# Patient Record
Sex: FEMALE | Race: WHITE | NOT HISPANIC OR LATINO
[De-identification: names, ages, dates, MRNs, and addresses within clinical notes are randomized per-mention and may not be internally consistent; named-entity substitution may affect disease eponyms.]

---

## 2017-03-29 ENCOUNTER — RX RENEWAL (OUTPATIENT)
Age: 62
End: 2017-03-29

## 2017-04-11 ENCOUNTER — RX RENEWAL (OUTPATIENT)
Age: 62
End: 2017-04-11

## 2017-04-25 ENCOUNTER — APPOINTMENT (OUTPATIENT)
Dept: NEUROLOGY | Facility: CLINIC | Age: 62
End: 2017-04-25

## 2017-04-25 VITALS
SYSTOLIC BLOOD PRESSURE: 109 MMHG | WEIGHT: 115 LBS | DIASTOLIC BLOOD PRESSURE: 69 MMHG | HEIGHT: 68 IN | BODY MASS INDEX: 17.43 KG/M2 | HEART RATE: 70 BPM

## 2017-05-16 ENCOUNTER — MEDICATION RENEWAL (OUTPATIENT)
Age: 62
End: 2017-05-16

## 2017-06-12 ENCOUNTER — MEDICATION RENEWAL (OUTPATIENT)
Age: 62
End: 2017-06-12

## 2017-06-22 ENCOUNTER — APPOINTMENT (OUTPATIENT)
Dept: INTERNAL MEDICINE | Facility: CLINIC | Age: 62
End: 2017-06-22

## 2017-06-22 VITALS
HEART RATE: 72 BPM | SYSTOLIC BLOOD PRESSURE: 106 MMHG | RESPIRATION RATE: 16 BRPM | OXYGEN SATURATION: 88 % | DIASTOLIC BLOOD PRESSURE: 62 MMHG | TEMPERATURE: 97.7 F

## 2017-06-22 VITALS — HEIGHT: 68 IN | BODY MASS INDEX: 16.06 KG/M2 | WEIGHT: 106 LBS

## 2017-06-22 DIAGNOSIS — G23.9 DEGENERATIVE DISEASE OF BASAL GANGLIA, UNSPECIFIED: ICD-10-CM

## 2017-07-03 ENCOUNTER — EMERGENCY (EMERGENCY)
Facility: HOSPITAL | Age: 62
LOS: 1 days | Discharge: HOME | End: 2017-07-03
Attending: EMERGENCY MEDICINE | Admitting: EMERGENCY MEDICINE
Payer: MEDICARE

## 2017-07-03 VITALS
OXYGEN SATURATION: 91 % | RESPIRATION RATE: 19 BRPM | SYSTOLIC BLOOD PRESSURE: 149 MMHG | DIASTOLIC BLOOD PRESSURE: 65 MMHG | HEART RATE: 88 BPM | TEMPERATURE: 98 F

## 2017-07-03 VITALS
HEART RATE: 89 BPM | RESPIRATION RATE: 20 BRPM | OXYGEN SATURATION: 95 % | TEMPERATURE: 98 F | DIASTOLIC BLOOD PRESSURE: 91 MMHG | SYSTOLIC BLOOD PRESSURE: 180 MMHG

## 2017-07-03 LAB
ALBUMIN SERPL ELPH-MCNC: 3.7 G/DL — SIGNIFICANT CHANGE UP (ref 3.3–5)
ALP SERPL-CCNC: 73 U/L — SIGNIFICANT CHANGE UP (ref 40–120)
ALT FLD-CCNC: 5 U/L RC — LOW (ref 10–45)
ANION GAP SERPL CALC-SCNC: 10 MMOL/L — SIGNIFICANT CHANGE UP (ref 5–17)
APTT BLD: 30.1 SEC — SIGNIFICANT CHANGE UP (ref 27.5–37.4)
AST SERPL-CCNC: 18 U/L — SIGNIFICANT CHANGE UP (ref 10–40)
BASOPHILS # BLD AUTO: 0 K/UL — SIGNIFICANT CHANGE UP (ref 0–0.2)
BASOPHILS NFR BLD AUTO: 0.4 % — SIGNIFICANT CHANGE UP (ref 0–2)
BILIRUB SERPL-MCNC: 0.2 MG/DL — SIGNIFICANT CHANGE UP (ref 0.2–1.2)
BUN SERPL-MCNC: 24 MG/DL — HIGH (ref 7–23)
CALCIUM SERPL-MCNC: 9.1 MG/DL — SIGNIFICANT CHANGE UP (ref 8.4–10.5)
CHLORIDE SERPL-SCNC: 94 MMOL/L — LOW (ref 96–108)
CO2 SERPL-SCNC: 32 MMOL/L — HIGH (ref 22–31)
CREAT SERPL-MCNC: 0.55 MG/DL — SIGNIFICANT CHANGE UP (ref 0.5–1.3)
EOSINOPHIL # BLD AUTO: 0 K/UL — SIGNIFICANT CHANGE UP (ref 0–0.5)
EOSINOPHIL NFR BLD AUTO: 0.4 % — SIGNIFICANT CHANGE UP (ref 0–6)
GLUCOSE SERPL-MCNC: 102 MG/DL — HIGH (ref 70–99)
HCT VFR BLD CALC: 39.9 % — SIGNIFICANT CHANGE UP (ref 34.5–45)
HGB BLD-MCNC: 13.3 G/DL — SIGNIFICANT CHANGE UP (ref 11.5–15.5)
INR BLD: 0.95 RATIO — SIGNIFICANT CHANGE UP (ref 0.88–1.16)
LYMPHOCYTES # BLD AUTO: 0.7 K/UL — LOW (ref 1–3.3)
LYMPHOCYTES # BLD AUTO: 10.4 % — LOW (ref 13–44)
MCHC RBC-ENTMCNC: 33.4 GM/DL — SIGNIFICANT CHANGE UP (ref 32–36)
MCHC RBC-ENTMCNC: 33.4 PG — SIGNIFICANT CHANGE UP (ref 27–34)
MCV RBC AUTO: 100 FL — SIGNIFICANT CHANGE UP (ref 80–100)
MONOCYTES # BLD AUTO: 0.5 K/UL — SIGNIFICANT CHANGE UP (ref 0–0.9)
MONOCYTES NFR BLD AUTO: 7.1 % — SIGNIFICANT CHANGE UP (ref 2–14)
NEUTROPHILS # BLD AUTO: 5.6 K/UL — SIGNIFICANT CHANGE UP (ref 1.8–7.4)
NEUTROPHILS NFR BLD AUTO: 81.7 % — HIGH (ref 43–77)
PLATELET # BLD AUTO: 154 K/UL — SIGNIFICANT CHANGE UP (ref 150–400)
POTASSIUM SERPL-MCNC: 4.5 MMOL/L — SIGNIFICANT CHANGE UP (ref 3.5–5.3)
POTASSIUM SERPL-SCNC: 4.5 MMOL/L — SIGNIFICANT CHANGE UP (ref 3.5–5.3)
PROT SERPL-MCNC: 6.9 G/DL — SIGNIFICANT CHANGE UP (ref 6–8.3)
PROTHROM AB SERPL-ACNC: 10.3 SEC — SIGNIFICANT CHANGE UP (ref 9.8–12.7)
RBC # BLD: 3.99 M/UL — SIGNIFICANT CHANGE UP (ref 3.8–5.2)
RBC # FLD: 13.2 % — SIGNIFICANT CHANGE UP (ref 10.3–14.5)
SODIUM SERPL-SCNC: 136 MMOL/L — SIGNIFICANT CHANGE UP (ref 135–145)
WBC # BLD: 6.9 K/UL — SIGNIFICANT CHANGE UP (ref 3.8–10.5)
WBC # FLD AUTO: 6.9 K/UL — SIGNIFICANT CHANGE UP (ref 3.8–10.5)

## 2017-07-03 PROCEDURE — 80053 COMPREHEN METABOLIC PANEL: CPT

## 2017-07-03 PROCEDURE — 99283 EMERGENCY DEPT VISIT LOW MDM: CPT | Mod: 25

## 2017-07-03 PROCEDURE — 85027 COMPLETE CBC AUTOMATED: CPT

## 2017-07-03 PROCEDURE — 71045 X-RAY EXAM CHEST 1 VIEW: CPT

## 2017-07-03 PROCEDURE — 99284 EMERGENCY DEPT VISIT MOD MDM: CPT | Mod: GC

## 2017-07-03 PROCEDURE — 85730 THROMBOPLASTIN TIME PARTIAL: CPT

## 2017-07-03 PROCEDURE — 85610 PROTHROMBIN TIME: CPT

## 2017-07-03 PROCEDURE — 71010: CPT | Mod: 26

## 2017-07-03 NOTE — ED ADULT NURSE NOTE - OBJECTIVE STATEMENT
61 yo female pt with history of alzheimers and parkinsons presents to ed via ambulance complaining of blood in mouth. as per pt's home aid pt is nonverbal at baseline "I found blood in her mouth and she was gurgling". ems states aid suctioned 150cc of blood from mouth. pulse ox 80% on RA, placed on 2L NC pulse ox 96%. aid denies trauma/fall to pt. as per aid "she fluctuates in oxygen constantly". aid also states she has a history of aspirations. airway patent. no blood noted in mouth or loose teeth. breath sounds clear and equal bilaterally. pt placed sitting up right. family at bedside. safety maintained. pt appears to be in no distress.

## 2017-07-03 NOTE — ED PROVIDER NOTE - CARE PLAN
Principal Discharge DX:	Mouth bleeding  Instructions for follow-up, activity and diet:	Follow up with your primary care doctor within 48-72 hours.   You must return for new, worsening or concerning symptoms; specifically including those listed on the attached sheet. Principal Discharge DX:	Mouth bleeding  Goal:	ATTENDING ADDENDUM (Dr. Curtis Maki): Goals of care include resolution of emergent/urgent symptoms and concerns, and restoration to baseline level of homeostasis.  Instructions for follow-up, activity and diet:	Follow up with your primary care doctor within 48-72 hours.   You must return for new, worsening or concerning symptoms; specifically including those listed on the attached sheet.  Secondary Diagnosis:	Epistaxis  Secondary Diagnosis:	Parkinson's disease (tremor, stiffness, slow motion, unstable posture)

## 2017-07-03 NOTE — ED PROVIDER NOTE - MEDICAL DECISION MAKING DETAILS
**ATTENDING MEDICAL DECISION MAKING/SYNTHESIS (Dr. Curtis Maki): I have reviewed the Chief Complaint, the HPI, the ROS, and have directly performed and confirmed the findings on the Physical Examination. I have reviewed the medical decision making with all providers, as applicable. The PROBLEM REPRESENTATION at this time is: 62-year-old woman with history of Alzheimer's dementia and Parkinson's disease, unspecified tonsil and adenoid disease, pharynx disease, and nonverbal at baseline now presenting to the ED with acute witnessed transient episode of hypoxia and respiratory distress (gagging, gurgling, aspiration) from hemorrhage noted in the mouth by patient's home healthcare aide; now resolved. The MOST LIKELY DIAGNOSIS, and the LIST OF DIFFERENTIAL DIAGNOSES, includes (but is not limited to) the following: hemopytsis (unlikely), hematemesis or coffee-ground emesis (unlikely), epistaxis (very possible), gingival wound (noted, but likely not cause of patient's event, more likely sequela of aggressive attempts at airway clearance), pulmonary embolism/deep venous thrombosis (unlikely given presentation and clinical findings), serious bacterial infection or sepsis/severe sepsis e.g. pneumonia (unlikely), PUD (associated with PEG, unlikely), dehydration, aspiration, electrolyte-metabolic-endocrine derangements, swallowing deficit secondary to deterioration of dementia or cerebrovascular accident/TIA (unlikely). The likelihood of each of these diagnoses has been appropriately considered in the context of this patient's presentation and my evaluation. PLAN: as described in EMR, including diagnostics, therapeutics and consultation as clinically warranted. I will continue to reevaluate the patient, including the results of all testing, and monitor response to therapy throughout the patient's course in the ED.

## 2017-07-03 NOTE — ED PROVIDER NOTE - OBJECTIVE STATEMENT
62 year old, history of alzheimers and parkinsons, unspecified tonsil and adenoid disease, pharynx disease,  nonverbal at baseline. aid at home heard gurgling in her mouth and saw blood in her mouth and suctioned out 150cc of blood. aid noted O2 sat on pulse ox to be in the low 80s and called EMS. on NRB by EMS saturation in high 90s. 62 year old, history of alzheimers and parkinsons, unspecified tonsil and adenoid disease, pharynx disease,  nonverbal at baseline. aid at home heard gurgling in her mouth and saw blood in her mouth and suctioned out 150cc of blood. aid noted O2 sat on pulse ox to be in the low 80s and called EMS. on NRB by EMS saturation in high 90s.    20:05 Madisyn PGY3: Family in room, discussed case. aid heard gurgling, placed suction in mouth and then saw blood. it is possible the blood came from trauma but aid says not likely. history of nose bleeds in the past. pulse ox at home does NOT have wave form and aid states she fluctuates oxygen sats constantly. needs O2 at home because of history of aspirations. 62 year old, history of alzheimers and parkinsons, unspecified tonsil and adenoid disease, pharynx disease,  nonverbal at baseline. aid at home heard gurgling in her mouth and saw blood in her mouth and suctioned out 150cc of blood. aid noted O2 sat on pulse ox to be in the low 80s and called EMS. on NRB by EMS saturation in high 90s.  20:05 Madisyn PGY3: Family in room, discussed case. aid heard gurgling, placed suction in mouth and then saw blood. it is possible the blood came from trauma but aid says not likely. history of nose bleeds in the past. pulse ox at home does NOT have wave form and aid states she fluctuates oxygen sats constantly. needs O2 at home because of history of aspirations.  **ATTENDING ADDENDUM (Dr. Curtis Maki): I attest that I have directly and personally interviewed and examined this patient and elicited a comparable history of present illness and review of systems as documented, along with my EM resident. I attest that I have made significant contributions to the documentation where necessary and as noted in the EMR.

## 2017-07-03 NOTE — ED PROVIDER NOTE - GASTROINTESTINAL, MLM
Abdomen soft, non-tender **ATTENDING ADDENDUM (Dr. Curtis Maki): NO guarding, rebound, or rigidity. NO pulsatile or non-pulsatile masses. NO hernias. NO obvious hepatosplenomegaly. POSITIVE PEG tube in expected position.

## 2017-07-03 NOTE — ED PROVIDER NOTE - ENMT, MLM
Airway patent, Nasal mucosa clear **ATTENDING ADDENDUM (Dr. Curtis Maki): right nare with evidence of prior epistaxis. Mouth with normal mucosa except for POSITIVE dried blood in the posterior pharynx. NO evidence of acute posterior epistaxis at this time. POSITIVE small superficial gingival laceration noted proximate to #[id number] (buccal aspect of gingiva), without active ooze or hemorrhage (hemostasis achieved with time at time of attending evaluation). Throat has no vesicles, no oropharyngeal exudates and uvula is midline.

## 2017-07-03 NOTE — ED PROVIDER NOTE - RESPIRATORY, MLM
Breath sounds clear and equal bilaterally. **ATTENDING ADDENDUM (Dr. Curtis Maki): NO wheezing, rales, rhonchi, crackles, stridor, drooling, retractions, nasal flaring, or tripoding.

## 2017-07-03 NOTE — ED PROVIDER NOTE - MUSCULOSKELETAL MINIMAL EXAM
atraumatic/no muscle tenderness/motor intact/**ATTENDING ADDENDUM (Dr. Curtis Maki): resting tremor noted/RANGE OF MOTION LIMITED

## 2017-07-03 NOTE — ED PROVIDER NOTE - PROGRESS NOTE DETAILS
**ATTENDING ADDENDUM (Dr. Curtis Maki): patient serially evaluated throughout ED course. NO acute deterioration up to this time in the ED. NO clinical evidence of active hemorrhage, either from epistaxis  or hemoptysis. NO obvious aspiration. NO evidence of acute respiratory failure at this time. Anticipatory guidance provided. Patient with home healthcare aide coverage at home. Appears to have good social and family support. Agree with discharge home with close outpatient followup with primary care physician/provider and with medications (if appropriate, if clinically indicated, and as prescribed, as noted in EMR). Goals/plan of ED care and disposition discussed with family and home healthcare aide at time of attending evaluation and throughout period of care in the ED by the ED team.

## 2017-07-03 NOTE — ED PROVIDER NOTE - CONSTITUTIONAL MENTATION
**ATTENDING ADDENDUM (Dr. Curtis Maki): non-verbal, responsive to some simple commands, consolable/awake/alert

## 2017-07-03 NOTE — ED PROVIDER NOTE - CHPI ED SYMPTOMS NEG
**ATTENDING ADDENDUM (Dr. Curtis Maki): at time of ED presentation, at baseline level of function and mental status

## 2017-07-03 NOTE — ED PROVIDER NOTE - PLAN OF CARE
Follow up with your primary care doctor within 48-72 hours.   You must return for new, worsening or concerning symptoms; specifically including those listed on the attached sheet. ATTENDING ADDENDUM (Dr. Curtis Maki): Goals of care include resolution of emergent/urgent symptoms and concerns, and restoration to baseline level of homeostasis.

## 2017-07-03 NOTE — ED PROVIDER NOTE - PSH
Appendix disease    Hernia    Intestinal disorder    Pharynx disease    Scoliosis    Tonsil and adenoid disease, chronic

## 2017-07-03 NOTE — ED PROVIDER NOTE - EYES, MLM
Clear bilaterally, pupils equal and round. **ATTENDING ADDENDUM (Dr. Curtis Maki): Extraocular muscle movements intact.

## 2017-07-03 NOTE — ED PROVIDER NOTE - PHYSICAL EXAMINATION
Madisyn: nonverbal, responds to commands , NAD, HEENT with dried blood in oropharynx, possible laceration to right posterior oropharynx, not actively bleeding otherwise and no facial asymmetry; lungs with transmitted upper airway sounds, heart with reg rhythm without murmur; abdomen soft  with peg tube in place; extremities with no edema; skin with no rashes, neuro exam aphasic with tremor of extremeties, moving all extremities. Madisyn: nonverbal, responds to commands , NAD, HEENT with dried blood in oropharynx, possible laceration to right posterior oropharynx, not actively bleeding otherwise right nostril with old cautery and possible recent bleed, no liliya blood and no facial asymmetry; lungs with transmitted upper airway sounds, heart with reg rhythm without murmur; abdomen soft  with peg tube in place; extremities with no edema; skin with no rashes, neuro exam aphasic with tremor of extremeties, moving all extremities. Madisyn: nonverbal, responds to commands , NAD, HEENT with dried blood in oropharynx, possible laceration to right posterior oropharynx, not actively bleeding otherwise right nostril with old cautery and possible recent bleed, no liliya blood and no facial asymmetry; lungs with transmitted upper airway sounds, heart with reg rhythm without murmur; abdomen soft  with peg tube in place; extremities with no edema; skin with no rashes, neuro exam aphasic with tremor of extremities, moving all extremities.

## 2017-07-03 NOTE — ED PROVIDER NOTE - CHPI ED SYMPTOMS POS
COUGH/**ATTENDING ADDENDUM (Dr. Curtis Maki): possible aspiration, possible hemoptysis or epistaxis/DIFFICULTY BREATHING

## 2017-07-03 NOTE — ED PROVIDER NOTE - NS ED ROS FT
**ATTENDING ADDENDUM (Dr. Curtis Maki): collateral HPI and review of systems obtained by son and home healthcare aide

## 2017-07-03 NOTE — ED PROVIDER NOTE - FAMILY HISTORY
<<-----Click on this checkbox to enter Family History Family history of Alzheimer's disease     Sibling  Still living? Yes, Estimated age: Age Unknown  Family history of arthritis, Age at diagnosis: Age Unknown

## 2017-07-03 NOTE — ED PROVIDER NOTE - ATTENDING CONTRIBUTION TO CARE
**ATTENDING ADDENDUM (Dr. Curtis Maki): I attest that I have directly examined this patient and reviewed and formulated the diagnostic and therapeutic management plan in collaboration with the EM resident. Please see MDM note and remainder of EMR for findings from CC, HPI, ROS, and PE. (Madisyn)

## 2017-07-03 NOTE — ED PROVIDER NOTE - GASTROINTESTINAL [+], MLM
**ATTENDING ADDENDUM (Dr. Curtis Maki): gastroesophageal reflux disease history; POSITIVE PEG tube in place

## 2017-07-03 NOTE — ED ADULT NURSE NOTE - PMH
Cognitive impairment    Depression    Gastroesophageal reflux disease, esophagitis presence not specified    Hypothyroidism    Incontinence of urine    Major depressive disorder, recurrent episode, unspecified severity    Mitral valve prolapse    Parkinson's disease (tremor, stiffness, slow motion, unstable posture)    Traumatic brain injury, without loss of consciousness, sequela

## 2017-07-03 NOTE — ED PROVIDER NOTE - UNABLE TO OBTAIN
Dementia **ATTENDING ADDENDUM (Dr. Curtis Maki): Exploration of CC, HPI and review of systems limited by patient's acuity and mental status (dementia) **ATTENDING ADDENDUM (Dr. Curtis Maki): Exploration of CC, HPI and review of systems limited by patient's acuity and mental status (dementia).

## 2017-07-03 NOTE — ED PROVIDER NOTE - CONDUCTED A DETAILED DISCUSSION WITH PATIENT AND/OR GUARDIAN REGARDING, MDM
radiology results/need for outpatient follow-up/return to ED if symptoms worsen, persist or questions arise/lab results/**ATTENDING ADDENDUM (Dr. Curtis Maki): Anticipatory guidance provided.

## 2017-07-13 ENCOUNTER — INPATIENT (INPATIENT)
Facility: HOSPITAL | Age: 62
LOS: 12 days | Discharge: NURSING, REHAB OR LONG-TERM CARE | DRG: 207 | End: 2017-07-26
Admitting: STUDENT IN AN ORGANIZED HEALTH CARE EDUCATION/TRAINING PROGRAM
Payer: MEDICARE

## 2017-07-13 VITALS
RESPIRATION RATE: 32 BRPM | SYSTOLIC BLOOD PRESSURE: 177 MMHG | DIASTOLIC BLOOD PRESSURE: 102 MMHG | OXYGEN SATURATION: 83 % | HEART RATE: 116 BPM

## 2017-07-13 DIAGNOSIS — J96.01 ACUTE RESPIRATORY FAILURE WITH HYPOXIA: ICD-10-CM

## 2017-07-13 DIAGNOSIS — Z29.9 ENCOUNTER FOR PROPHYLACTIC MEASURES, UNSPECIFIED: ICD-10-CM

## 2017-07-13 DIAGNOSIS — R04.0 EPISTAXIS: ICD-10-CM

## 2017-07-13 DIAGNOSIS — E03.9 HYPOTHYROIDISM, UNSPECIFIED: ICD-10-CM

## 2017-07-13 DIAGNOSIS — G21.19 OTHER DRUG INDUCED SECONDARY PARKINSONISM: ICD-10-CM

## 2017-07-13 DIAGNOSIS — J96.90 RESPIRATORY FAILURE, UNSPECIFIED, UNSPECIFIED WHETHER WITH HYPOXIA OR HYPERCAPNIA: ICD-10-CM

## 2017-07-13 LAB
ALBUMIN SERPL ELPH-MCNC: 4.2 G/DL — SIGNIFICANT CHANGE UP (ref 3.3–5)
ALP SERPL-CCNC: 73 U/L — SIGNIFICANT CHANGE UP (ref 40–120)
ALT FLD-CCNC: 8 U/L RC — LOW (ref 10–45)
ANION GAP SERPL CALC-SCNC: 14 MMOL/L — SIGNIFICANT CHANGE UP (ref 5–17)
APTT BLD: 30.4 SEC — SIGNIFICANT CHANGE UP (ref 27.5–37.4)
AST SERPL-CCNC: 56 U/L — HIGH (ref 10–40)
BASE EXCESS BLDV CALC-SCNC: -0.8 MMOL/L — SIGNIFICANT CHANGE UP (ref -2–2)
BASOPHILS # BLD AUTO: 0.1 K/UL — SIGNIFICANT CHANGE UP (ref 0–0.2)
BASOPHILS NFR BLD AUTO: 0.9 % — SIGNIFICANT CHANGE UP (ref 0–2)
BILIRUB SERPL-MCNC: 0.2 MG/DL — SIGNIFICANT CHANGE UP (ref 0.2–1.2)
BUN SERPL-MCNC: 24 MG/DL — HIGH (ref 7–23)
CA-I SERPL-SCNC: 1.19 MMOL/L — SIGNIFICANT CHANGE UP (ref 1.12–1.3)
CALCIUM SERPL-MCNC: 9.8 MG/DL — SIGNIFICANT CHANGE UP (ref 8.4–10.5)
CHLORIDE BLDV-SCNC: 96 MMOL/L — SIGNIFICANT CHANGE UP (ref 96–108)
CHLORIDE SERPL-SCNC: 94 MMOL/L — LOW (ref 96–108)
CK MB CFR SERPL CALC: 2.1 NG/ML — SIGNIFICANT CHANGE UP (ref 0–3.8)
CO2 BLDV-SCNC: 36 MMOL/L — HIGH (ref 22–30)
CO2 SERPL-SCNC: 29 MMOL/L — SIGNIFICANT CHANGE UP (ref 22–31)
CREAT SERPL-MCNC: 0.92 MG/DL — SIGNIFICANT CHANGE UP (ref 0.5–1.3)
EOSINOPHIL # BLD AUTO: 0.1 K/UL — SIGNIFICANT CHANGE UP (ref 0–0.5)
EOSINOPHIL NFR BLD AUTO: 0.6 % — SIGNIFICANT CHANGE UP (ref 0–6)
GAS PNL BLDA: SIGNIFICANT CHANGE UP
GAS PNL BLDV: 132 MMOL/L — LOW (ref 136–145)
GAS PNL BLDV: SIGNIFICANT CHANGE UP
GAS PNL BLDV: SIGNIFICANT CHANGE UP
GLUCOSE BLDV-MCNC: 242 MG/DL — HIGH (ref 70–99)
GLUCOSE SERPL-MCNC: 273 MG/DL — HIGH (ref 70–99)
HCO3 BLDV-SCNC: 33 MMOL/L — HIGH (ref 21–29)
HCT VFR BLD CALC: 44 % — SIGNIFICANT CHANGE UP (ref 34.5–45)
HCT VFR BLDA CALC: 45 % — SIGNIFICANT CHANGE UP (ref 39–50)
HGB BLD CALC-MCNC: 14.7 G/DL — SIGNIFICANT CHANGE UP (ref 11.5–15.5)
HGB BLD-MCNC: 14.4 G/DL — SIGNIFICANT CHANGE UP (ref 11.5–15.5)
INR BLD: 0.96 RATIO — SIGNIFICANT CHANGE UP (ref 0.88–1.16)
LACTATE BLDV-MCNC: 3.6 MMOL/L — HIGH (ref 0.7–2)
LYMPHOCYTES # BLD AUTO: 1.8 K/UL — SIGNIFICANT CHANGE UP (ref 1–3.3)
LYMPHOCYTES # BLD AUTO: 18 % — SIGNIFICANT CHANGE UP (ref 13–44)
MCHC RBC-ENTMCNC: 32.6 GM/DL — SIGNIFICANT CHANGE UP (ref 32–36)
MCHC RBC-ENTMCNC: 33.2 PG — SIGNIFICANT CHANGE UP (ref 27–34)
MCV RBC AUTO: 102 FL — HIGH (ref 80–100)
MONOCYTES # BLD AUTO: 0.7 K/UL — SIGNIFICANT CHANGE UP (ref 0–0.9)
MONOCYTES NFR BLD AUTO: 6.9 % — SIGNIFICANT CHANGE UP (ref 2–14)
NEUTROPHILS # BLD AUTO: 7.3 K/UL — SIGNIFICANT CHANGE UP (ref 1.8–7.4)
NEUTROPHILS NFR BLD AUTO: 73.6 % — SIGNIFICANT CHANGE UP (ref 43–77)
PCO2 BLDV: 105 MMHG — HIGH (ref 35–50)
PH BLDV: 7.12 — CRITICAL LOW (ref 7.35–7.45)
PLATELET # BLD AUTO: 249 K/UL — SIGNIFICANT CHANGE UP (ref 150–400)
PO2 BLDV: 39 MMHG — SIGNIFICANT CHANGE UP (ref 25–45)
POTASSIUM BLDV-SCNC: 7.6 MMOL/L — CRITICAL HIGH (ref 3.5–5)
POTASSIUM SERPL-MCNC: 5.5 MMOL/L — HIGH (ref 3.5–5.3)
POTASSIUM SERPL-SCNC: 5.5 MMOL/L — HIGH (ref 3.5–5.3)
PROT SERPL-MCNC: 8.2 G/DL — SIGNIFICANT CHANGE UP (ref 6–8.3)
PROTHROM AB SERPL-ACNC: 10.4 SEC — SIGNIFICANT CHANGE UP (ref 9.8–12.7)
RBC # BLD: 4.33 M/UL — SIGNIFICANT CHANGE UP (ref 3.8–5.2)
RBC # FLD: 13 % — SIGNIFICANT CHANGE UP (ref 10.3–14.5)
SAO2 % BLDV: 54 % — LOW (ref 67–88)
SODIUM SERPL-SCNC: 137 MMOL/L — SIGNIFICANT CHANGE UP (ref 135–145)
TROPONIN T SERPL-MCNC: <0.01 NG/ML — SIGNIFICANT CHANGE UP (ref 0–0.06)
WBC # BLD: 10 K/UL — SIGNIFICANT CHANGE UP (ref 3.8–10.5)
WBC # FLD AUTO: 10 K/UL — SIGNIFICANT CHANGE UP (ref 3.8–10.5)

## 2017-07-13 PROCEDURE — 99291 CRITICAL CARE FIRST HOUR: CPT

## 2017-07-13 PROCEDURE — 71010: CPT | Mod: 26,77

## 2017-07-13 PROCEDURE — 30901 CONTROL OF NOSEBLEED: CPT

## 2017-07-13 PROCEDURE — 71010: CPT | Mod: 26

## 2017-07-13 PROCEDURE — 99221 1ST HOSP IP/OBS SF/LOW 40: CPT | Mod: 25

## 2017-07-13 PROCEDURE — 93010 ELECTROCARDIOGRAM REPORT: CPT | Mod: 59

## 2017-07-13 PROCEDURE — 31500 INSERT EMERGENCY AIRWAY: CPT | Mod: GC

## 2017-07-13 PROCEDURE — 99291 CRITICAL CARE FIRST HOUR: CPT | Mod: 25,GC

## 2017-07-13 RX ORDER — CEFAZOLIN SODIUM 1 G
1000 VIAL (EA) INJECTION ONCE
Qty: 0 | Refills: 0 | Status: COMPLETED | OUTPATIENT
Start: 2017-07-13 | End: 2017-07-13

## 2017-07-13 RX ORDER — ETOMIDATE 2 MG/ML
20 INJECTION INTRAVENOUS ONCE
Qty: 0 | Refills: 0 | Status: COMPLETED | OUTPATIENT
Start: 2017-07-13 | End: 2017-07-13

## 2017-07-13 RX ORDER — HEPARIN SODIUM 5000 [USP'U]/ML
5000 INJECTION INTRAVENOUS; SUBCUTANEOUS EVERY 8 HOURS
Qty: 0 | Refills: 0 | Status: DISCONTINUED | OUTPATIENT
Start: 2017-07-13 | End: 2017-07-26

## 2017-07-13 RX ORDER — SUCCINYLCHOLINE CHLORIDE 100 MG/5ML
100 SYRINGE (ML) INTRAVENOUS ONCE
Qty: 0 | Refills: 0 | Status: COMPLETED | OUTPATIENT
Start: 2017-07-13 | End: 2017-07-13

## 2017-07-13 RX ORDER — CEFAZOLIN SODIUM 1 G
VIAL (EA) INJECTION
Qty: 0 | Refills: 0 | Status: DISCONTINUED | OUTPATIENT
Start: 2017-07-13 | End: 2017-07-14

## 2017-07-13 RX ORDER — PANTOPRAZOLE SODIUM 20 MG/1
40 TABLET, DELAYED RELEASE ORAL
Qty: 0 | Refills: 0 | Status: DISCONTINUED | OUTPATIENT
Start: 2017-07-13 | End: 2017-07-13

## 2017-07-13 RX ORDER — CARBIDOPA AND LEVODOPA 25; 100 MG/1; MG/1
2 TABLET ORAL EVERY 4 HOURS
Qty: 0 | Refills: 0 | Status: DISCONTINUED | OUTPATIENT
Start: 2017-07-13 | End: 2017-07-20

## 2017-07-13 RX ORDER — CALCIUM GLUCONATE 100 MG/ML
1 VIAL (ML) INTRAVENOUS ONCE
Qty: 1 | Refills: 0 | Status: COMPLETED | OUTPATIENT
Start: 2017-07-13 | End: 2017-07-13

## 2017-07-13 RX ORDER — FENTANYL CITRATE 50 UG/ML
3 INJECTION INTRAVENOUS
Qty: 2500 | Refills: 0 | Status: DISCONTINUED | OUTPATIENT
Start: 2017-07-13 | End: 2017-07-13

## 2017-07-13 RX ORDER — FENTANYL CITRATE 50 UG/ML
3.56 INJECTION INTRAVENOUS
Qty: 5000 | Refills: 0 | Status: DISCONTINUED | OUTPATIENT
Start: 2017-07-13 | End: 2017-07-15

## 2017-07-13 RX ORDER — LEVOTHYROXINE SODIUM 125 MCG
75 TABLET ORAL DAILY
Qty: 0 | Refills: 0 | Status: DISCONTINUED | OUTPATIENT
Start: 2017-07-13 | End: 2017-07-13

## 2017-07-13 RX ORDER — LEVOTHYROXINE SODIUM 125 MCG
75 TABLET ORAL DAILY
Qty: 0 | Refills: 0 | Status: DISCONTINUED | OUTPATIENT
Start: 2017-07-13 | End: 2017-07-26

## 2017-07-13 RX ORDER — SODIUM CHLORIDE 9 MG/ML
2000 INJECTION INTRAMUSCULAR; INTRAVENOUS; SUBCUTANEOUS ONCE
Qty: 0 | Refills: 0 | Status: COMPLETED | OUTPATIENT
Start: 2017-07-13 | End: 2017-07-13

## 2017-07-13 RX ORDER — CEFAZOLIN SODIUM 1 G
1000 VIAL (EA) INJECTION EVERY 8 HOURS
Qty: 0 | Refills: 0 | Status: DISCONTINUED | OUTPATIENT
Start: 2017-07-13 | End: 2017-07-14

## 2017-07-13 RX ORDER — PANTOPRAZOLE SODIUM 20 MG/1
40 TABLET, DELAYED RELEASE ORAL
Qty: 0 | Refills: 0 | Status: DISCONTINUED | OUTPATIENT
Start: 2017-07-13 | End: 2017-07-26

## 2017-07-13 RX ADMIN — HEPARIN SODIUM 5000 UNIT(S): 5000 INJECTION INTRAVENOUS; SUBCUTANEOUS at 21:07

## 2017-07-13 RX ADMIN — Medication 125 MILLIGRAM(S): at 11:15

## 2017-07-13 RX ADMIN — HEPARIN SODIUM 5000 UNIT(S): 5000 INJECTION INTRAVENOUS; SUBCUTANEOUS at 16:54

## 2017-07-13 RX ADMIN — FENTANYL CITRATE 8 MICROGRAM(S)/KG/HR: 50 INJECTION INTRAVENOUS at 16:53

## 2017-07-13 RX ADMIN — Medication 100 MILLIGRAM(S): at 11:11

## 2017-07-13 RX ADMIN — CARBIDOPA AND LEVODOPA 2 TABLET(S): 25; 100 TABLET ORAL at 23:09

## 2017-07-13 RX ADMIN — FENTANYL CITRATE 8 MICROGRAM(S)/KG/HR: 50 INJECTION INTRAVENOUS at 13:39

## 2017-07-13 RX ADMIN — Medication 100 MILLIGRAM(S): at 16:30

## 2017-07-13 RX ADMIN — CARBIDOPA AND LEVODOPA 2 TABLET(S): 25; 100 TABLET ORAL at 16:54

## 2017-07-13 RX ADMIN — Medication 100 MILLIGRAM(S): at 21:06

## 2017-07-13 RX ADMIN — SODIUM CHLORIDE 1000 MILLILITER(S): 9 INJECTION INTRAMUSCULAR; INTRAVENOUS; SUBCUTANEOUS at 11:07

## 2017-07-13 RX ADMIN — CARBIDOPA AND LEVODOPA 2 TABLET(S): 25; 100 TABLET ORAL at 20:00

## 2017-07-13 RX ADMIN — Medication 75 MICROGRAM(S): at 16:54

## 2017-07-13 RX ADMIN — ETOMIDATE 20 MILLIGRAM(S): 2 INJECTION INTRAVENOUS at 11:10

## 2017-07-13 RX ADMIN — Medication 200 GRAM(S): at 20:00

## 2017-07-13 RX ADMIN — PANTOPRAZOLE SODIUM 40 MILLIGRAM(S): 20 TABLET, DELAYED RELEASE ORAL at 17:29

## 2017-07-13 NOTE — H&P ADULT - PROBLEM SELECTOR PLAN 1
- Likely 2/2 Epistaxis, no PNA visualized on CXR. Awaiting US once patient on the Unit  - - wean FiO2, keep Sat >92%  - frequent suctioning  -  Check ABG and adjust Vent settings as needed  - ENT evaluation for epistaxis.  - Protonix while on vent

## 2017-07-13 NOTE — H&P ADULT - ASSESSMENT
62 -year-old F with cognitive impairment, TBI c/b ICH, Drug-induced Parkinson's Disease (neuroleptic use) s/p PEG placement for aspiration, Depression, Schizophrenia, Hypothyroidism, GERD, evaluated for epistaxis, had respiratory distress in the setting of epistaxis and failure to protect airway, admitted to MICU

## 2017-07-13 NOTE — H&P ADULT - NSHPLABSRESULTS_GEN_ALL_CORE
Labs personally reviewed: CBC unremarkable, PTT/INR WNL, CMP significant for a Potassium of 5.5 but hemolyzed, AST of 56, ALT of 8, pH of 7.18 on VBG, Lactate of 3.6, pCO2 of 105, pO2 of 39. CE negative     CXR: Personally reviewed- Clear lungs, ET tube in place                           14.4   10.0  )-----------( 249      ( 13 Jul 2017 11:05 )             44.0       07-13    137  |  94<L>  |  24<H>  ----------------------------<  273<H>  5.5<H>   |  29  |  0.92    Ca    9.8      13 Jul 2017 11:05    TPro  8.2  /  Alb  4.2  /  TBili  0.2  /  DBili  x   /  AST  56<H>  /  ALT  8<L>  /  AlkPhos  73  07-13      PT/INR - ( 13 Jul 2017 11:05 )   PT: 10.4 sec;   INR: 0.96 ratio         PTT - ( 13 Jul 2017 11:05 )  PTT:30.4 sec    CARDIAC MARKERS ( 13 Jul 2017 11:05 )  x     / <0.01 ng/mL / x     / x     / 2.1 ng/mL

## 2017-07-13 NOTE — H&P ADULT - HISTORY OF PRESENT ILLNESS
62-y F with cognitive impairment, TBI c/b ICH, Drug-induced Parkinson's Disease (neuroleptic use), nonverbal at baseline, s/p PEG placement for aspiration, Depression, Schizophrenia, Hypothyroidism, GERD, BIBEMS for evaluation of epistaxis.  Patient has had multiple hospitalization in past several years for pneumonia due to aspiration.  At home patient noticed blood in her airway while doing suctioning.  (requires chronic suctioning to maintain airway).  She went to ER and there was noted to have acute desaturation and altered mental status, placed on BIPAP. However, pt was emergently intubated in setting 2/2 to hypoxia and hypercapnia.    VS in the ED:   T:?, HR:116, BP:177/102, RR: 32, 83% O2 on BIPAP 62-y F with cognitive impairment, TBI c/b ICH, Drug-induced Parkinson's Disease (neuroleptic use), nonverbal at baseline, s/p PEG placement for aspiration, Depression, Schizophrenia, Hypothyroidism, GERD, BIBEMS for evaluation of epistaxis.  Patient has had multiple hospitalization in past several years for pneumonia due to aspiration.  At home patient noticed blood in her airway while doing suctioning.  (requires chronic suctioning to maintain airway).  She went to ER and there was noted to have acute desaturation and altered mental status, placed on BIPAP. However, pt was emergently intubated in setting 2/2 to hypoxia and hypercapnia. Of note patient has a history of nasal surgery in the past/ unclear exactly what was performed.     VS in the ED:   T:?, HR:116, BP:177/102, RR: 32, 83% O2 on BIPAP    Labs personally reviewed: CBC unremarkable, PTT/INR WNL, CMP significant for a Potassium of 5.5 but hemolyzed, AST of 56, ALT of 8, pH of 7.18 on VBG, Lactate of 3.6, pCO2 of 105, pO2 of 39. CE negative

## 2017-07-13 NOTE — ED PROVIDER NOTE - FAMILY HISTORY
Family history of Alzheimer's disease     Sibling  Still living? Yes, Estimated age: Age Unknown  Family history of arthritis, Age at diagnosis: Age Unknown

## 2017-07-13 NOTE — ED PROVIDER NOTE - CRITICAL CARE PROVIDED
conducted a detailed discussion of DNR status/documentation/additional history taking/interpretation of diagnostic studies/consult w/ pt's family directly relating to pts condition/telephone consultation with the patient's family

## 2017-07-13 NOTE — ED PROVIDER NOTE - CARE PLAN
Principal Discharge DX:	Respiratory failure  Secondary Diagnosis:	Epistaxis, recurrent Principal Discharge DX:	Respiratory failure  Secondary Diagnosis:	Epistaxis, recurrent  Secondary Diagnosis:	Acute on chronic respiratory failure with hypercapnia

## 2017-07-13 NOTE — ED PROVIDER NOTE - MEDICAL DECISION MAKING DETAILS
Davina Ferrari, DO: 62F with multiple comorbidities admitted multiple times for respiratory distress 2/2 epistaxis likely to epistaxis. Pt full code per conversation with brother per attending. Davina Ferrari, DO: 62F with multiple comorbidities admitted multiple times for respiratory distress 2/2 epistaxis likely to epistaxis. Pt full code per conversation with brother per attending.    Enzo AN: Critically patient BIBA for acute epistaxis leading to hypoxemia. Efforts made to treat patient medically and avoid intubation were made but patient was ultimately intubated for airway protection and clinically deterioration. Patient is full code as per HC proxy. Patient will require ICU level of care. Suspect Hypercapnic respiratory failure acute on chronic due to reaction to moderate to severe bleeding or lack of compliance to medication or idiopathic exacerbation. Plan CBC, CMP, Cardiacs, IVF, Solumedrol, Duoneb, Mechanical ventilation, Fentanyl drip. Possible ENT consult.

## 2017-07-13 NOTE — ED PROVIDER NOTE - PROGRESS NOTE DETAILS
Borther and Mother (Health care proxy) called and were informed of patient's condition and actual respiratory status. Patient is reportedly full code. Mom reports patient was admitted in the past and had been intubated Brother and Mother (Health care proxy) called and were informed of patient's condition and actual respiratory status. Patient is reportedly full code. Mom reports patient was admitted in the past and had been intubated

## 2017-07-13 NOTE — CONSULT NOTE ADULT - SUBJECTIVE AND OBJECTIVE BOX
CC: recurrent epistaxis     HPI: Patient is a 62y old  Female with significant pmhx presents with recurrent right sided nose bleed. pt was seen in the ed this am rapid rhino (nasal packing) placed in right nare to control bleeding. Pt was found to be hypoxic and hypocapnic was placed on BIPAP and was emergently intubated after. Pt had an episode of epistaxis last week as per aid, Went to [facility] and was discharged home same day after bleeding was controlled. As per aid pt also has nasal surgery unclear of what was done and occasionally needs oxygen at home.  No AC use, bp well controlled.      PAST MEDICAL & SURGICAL HISTORY:  Cognitive impairment  Traumatic brain injury, without loss of consciousness, sequela  Mitral valve prolapse  Major depressive disorder, recurrent episode, unspecified severity  Gastroesophageal reflux disease, esophagitis presence not specified  Hypothyroidism  Depression  Incontinence of urine  Parkinson's disease (tremor, stiffness, slow motion, unstable posture)  Scoliosis  Appendix disease  Intestinal disorder  Pharynx disease  Tonsil and adenoid disease, chronic  Hernia    Allergies    sulfa drugs (Unknown)    Intolerances      MEDICATIONS  (STANDING):  carbidopa/levodopa  25/100 2 Tablet(s) Oral every 4 hours  fentaNYL   Infusion 3.556 MICROgram(s)/kG/Hr (8 mL/Hr) IV Continuous <Continuous>  heparin  Injectable 5000 Unit(s) SubCutaneous every 8 hours  levothyroxine 75 MICROGram(s) Oral daily  pantoprazole   Suspension 40 milliGRAM(s) Oral before breakfast  ceFAZolin   IVPB   IV Intermittent   ceFAZolin   IVPB 1000 milliGRAM(s) IV Intermittent every 8 hours    MEDICATIONS  (PRN):    Social History: unable to assess 2/2 intubation     ROS: ENT, GI, , CV, Pulm, Neuro, Psych, MS, Heme, Endo, Constutional; all negative except as noted in HPI    Vital Signs Last 24 Hrs  T(C): 36.3 (13 Jul 2017 14:00), Max: 36.3 (13 Jul 2017 14:00)  T(F): 97.4 (13 Jul 2017 14:00), Max: 97.4 (13 Jul 2017 14:00)  HR: 101 (13 Jul 2017 17:00) (74 - 116)  BP: 145/67 (13 Jul 2017 17:00) (71/59 - 185/79)  BP(mean): 97 (13 Jul 2017 17:00) (70 - 109)  RR: 20 (13 Jul 2017 17:00) (20 - 32)  SpO2: 100% (13 Jul 2017 17:00) (83% - 100%)                          14.4   10.0  )-----------( 249      ( 13 Jul 2017 11:05 )             44.0    07-13    137  |  94<L>  |  24<H>  ----------------------------<  273<H>  5.5<H>   |  29  |  0.92    Ca    9.8      13 Jul 2017 11:05    TPro  8.2  /  Alb  4.2  /  TBili  0.2  /  DBili  x   /  AST  56<H>  /  ALT  8<L>  /  AlkPhos  73  07-13   PT/INR - ( 13 Jul 2017 11:05 )   PT: 10.4 sec;   INR: 0.96 ratio         PTT - ( 13 Jul 2017 11:05 )  PTT:30.4 sec    PHYSICAL EXAM:  Gen: NAD, well-developed  Head: Normocephalic, Atraumatic  Face: no edema/erythema/fluctuance, parotid glands soft without mass  Eyes: PERRL, EOMI, no scleral injection  Nose: Right rapid rhino in place, no active bleeding   Mouth: ET tube in place, no edema, no bleeding, unable to visualized oropharynx due to corporation   Neck: Flat, supple, no lymphadenopathy, trachea midline, no masses  Resp: breathing easily, no stridor  CV: no peripheral edema/cyanosis

## 2017-07-13 NOTE — H&P ADULT - ATTENDING COMMENTS
Agree with above. Patient with known TBI and Drug-induced Parkinsons presenting with epistaxis. She was in respiratory distress in the ED and was given a trial of BIPAP but intubated after being noted to be acidotic and hypercapnic on VBG  -Hypoxic and Hypercapnic respiratory failure - intubation. Check ABG and adjust vent settings as needed  -Epistaxis - recurrent epistaxis with a history of nasal surgery - will call ENT  -TBI/Parkinsons - as per aide at bedside patient is verbal at baseline - will need to get better understanding of functional status so we can see if she is returning to baseline. Based on previous records it looks like family refused Palliative/DNR. Patient's primary decision maker is her mother who just recently had a stroke so we will need to clarify who will be making decisions for her.  -Hypotension - s/p 2 liters IVF in ED - no signs/symptoms of infection - monitor BP  -Hyperkalemia (5.5) and bump in creatinine consistent with MARLENE - has gotten IVF - will repeat labs  -Admit to MICU for further management  -Critical Care Time 50 minutes

## 2017-07-13 NOTE — ED PROVIDER NOTE - ATTENDING CONTRIBUTION TO CARE
Attending MD Giraldo:  I personally have seen and examined this patient.  Resident note reviewed and agree on plan of care and except where noted.  See MDM for details.

## 2017-07-13 NOTE — ED PROVIDER NOTE - OBJECTIVE STATEMENT
Enzo NA: 61 y/o female with hx of Parkinson's and Hypothyroidism BIBA after aid called for recurrent epistaxis. As per EMS, patient had O2 saturation of 74 at arrival Enzo AN: 63 y/o female with hx of Parkinson's and Hypothyroidism BIBA after aid called for recurrent epistaxis. As per EMS, patient had O2 saturation of 74 at arrival.    Davina Ferrari, DO: Agree with above. 62-y F with cognitive impairment, TBI c/b ICH, Drug-induced Parkinson's Disease (neuroleptic use), nonverbal at baseline, s/p PEG placement for aspiration, Depression, Schizophrenia, Hypothyroidism, GERD, BIBEMS for evaluation of epistaxis with saturation of 74. Per aide & chart review, pt has had multiple hospitalizations, for recurrent epistaxis, most recently d/c. Pt brought in on BIPAP & ultimately intubated 2/2 respiratory distress likely to aspiration of blood. Enzo AN: 61 y/o female with hx of Parkinson's and Hypothyroidism BIBA after aid called for recurrent epistaxis. As per EMS, patient had O2 saturation of 74 on arrival. Patient slighltly improved to O2 saturation after EMS used CPAP. Patient also noted to have actice bleeding from R nostril. In ER patient appeared obtunded and was tachycardic with 's and O2 saturation had not good waveform. Patient was immediately placed on BiPAP (12/5) and IV access obtained. Patient received Solumedrol 125 mg IV and Duonebs. Patient improved slightly with Os sat 95% but remaiend obtunded and decision to be intubated was made. Patient was placed on sniffing position. Patient was noted to have moderate amount of blood in airway and first attempt with DL failed. Second attempt was done with Video Laryngoscopy and patient was then successfully intubated. Patient was then started on Fentanyl drip for sedation and mechanical ventilation instituted.     Davina Ferrari, DO: Agree with above. 62-y F with cognitive impairment, TBI c/b ICH, Drug-induced Parkinson's Disease (neuroleptic use), nonverbal at baseline, s/p PEG placement for aspiration, Depression, Schizophrenia, Hypothyroidism, GERD, BIBEMS for evaluation of epistaxis with saturation of 74. Per aide & chart review, pt has had multiple hospitalizations, for recurrent epistaxis, most recently d/c. Pt brought in on BIPAP & ultimately intubated 2/2 respiratory distress likely to aspiration of blood.

## 2017-07-13 NOTE — H&P ADULT - NSHPPHYSICALEXAM_GEN_ALL_CORE
General:  NAD  HEENT: PERRLA, EOMI, moist mucous membranes  Neurology: Intubated/Sedated  Respiratory: CTA B/L, normal respiratory effort, no wheezes, crackles, rales  CV: RRR, S1S2, no murmurs, rubs or gallops  Abdominal: Soft, NT, ND +BS. PEG tube in place  Extremities: No edema, + peripheral pulses  Skin: Dry/Warm

## 2017-07-13 NOTE — ED PROVIDER NOTE - PHYSICAL EXAMINATION
Davina Ferrari, DO:  Gen: Acute respiratory distress with labored audible breathing with accessory muscle use, on BIPAP, cachetic   Head: NCAT  HEENT: PERRL, mucosal bleeding in R nare, normal conjunctiva, anicteric, neck supple, no JVD  Lung: diffuse wheezing in all lung fields  CV: RRR, no murmurs, rubs or gallops  Abd: soft, ND  MSK: No edema, no visible deformities  Neuro: No focal neurologic deficits. CN II-XII grossly intact. Moving all extremities equally, grossly contracted.  Skin: Warm and dry, no evidence of rash

## 2017-07-14 LAB
ALBUMIN SERPL ELPH-MCNC: 2.9 G/DL — LOW (ref 3.3–5)
ALP SERPL-CCNC: 49 U/L — SIGNIFICANT CHANGE UP (ref 40–120)
ALT FLD-CCNC: 5 U/L RC — LOW (ref 10–45)
ANION GAP SERPL CALC-SCNC: 14 MMOL/L — SIGNIFICANT CHANGE UP (ref 5–17)
APPEARANCE UR: CLEAR — SIGNIFICANT CHANGE UP
APTT BLD: 33.8 SEC — SIGNIFICANT CHANGE UP (ref 27.5–37.4)
AST SERPL-CCNC: 30 U/L — SIGNIFICANT CHANGE UP (ref 10–40)
BILIRUB SERPL-MCNC: 0.3 MG/DL — SIGNIFICANT CHANGE UP (ref 0.2–1.2)
BILIRUB UR-MCNC: NEGATIVE — SIGNIFICANT CHANGE UP
BUN SERPL-MCNC: 26 MG/DL — HIGH (ref 7–23)
CALCIUM SERPL-MCNC: 8 MG/DL — LOW (ref 8.4–10.5)
CHLORIDE SERPL-SCNC: 105 MMOL/L — SIGNIFICANT CHANGE UP (ref 96–108)
CK MB BLD-MCNC: 2.1 % — SIGNIFICANT CHANGE UP (ref 0–3.5)
CK MB BLD-MCNC: 2.5 % — SIGNIFICANT CHANGE UP (ref 0–3.5)
CK MB BLD-MCNC: 3.3 % — SIGNIFICANT CHANGE UP (ref 0–3.5)
CK MB CFR SERPL CALC: 2.2 NG/ML — SIGNIFICANT CHANGE UP (ref 0–3.8)
CK MB CFR SERPL CALC: 3.4 NG/ML — SIGNIFICANT CHANGE UP (ref 0–3.8)
CK MB CFR SERPL CALC: 4.8 NG/ML — HIGH (ref 0–3.8)
CK SERPL-CCNC: 104 U/L — SIGNIFICANT CHANGE UP (ref 25–170)
CK SERPL-CCNC: 138 U/L — SIGNIFICANT CHANGE UP (ref 25–170)
CK SERPL-CCNC: 147 U/L — SIGNIFICANT CHANGE UP (ref 25–170)
CO2 SERPL-SCNC: 22 MMOL/L — SIGNIFICANT CHANGE UP (ref 22–31)
COLOR SPEC: YELLOW — SIGNIFICANT CHANGE UP
COMMENT - URINE: SIGNIFICANT CHANGE UP
CREAT SERPL-MCNC: 0.58 MG/DL — SIGNIFICANT CHANGE UP (ref 0.5–1.3)
DIFF PNL FLD: ABNORMAL
GAS PNL BLDA: SIGNIFICANT CHANGE UP
GAS PNL BLDA: SIGNIFICANT CHANGE UP
GLUCOSE SERPL-MCNC: 130 MG/DL — HIGH (ref 70–99)
GLUCOSE UR QL: NEGATIVE — SIGNIFICANT CHANGE UP
HCT VFR BLD CALC: 31.4 % — LOW (ref 34.5–45)
HCT VFR BLD CALC: 35.1 % — SIGNIFICANT CHANGE UP (ref 34.5–45)
HGB BLD-MCNC: 10.4 G/DL — LOW (ref 11.5–15.5)
HGB BLD-MCNC: 11.7 G/DL — SIGNIFICANT CHANGE UP (ref 11.5–15.5)
INR BLD: 1.1 RATIO — SIGNIFICANT CHANGE UP (ref 0.88–1.16)
KETONES UR-MCNC: ABNORMAL
LEUKOCYTE ESTERASE UR-ACNC: ABNORMAL
MAGNESIUM SERPL-MCNC: 1.8 MG/DL — SIGNIFICANT CHANGE UP (ref 1.6–2.6)
MCHC RBC-ENTMCNC: 33.1 PG — SIGNIFICANT CHANGE UP (ref 27–34)
MCHC RBC-ENTMCNC: 33.2 GM/DL — SIGNIFICANT CHANGE UP (ref 32–36)
MCHC RBC-ENTMCNC: 33.3 GM/DL — SIGNIFICANT CHANGE UP (ref 32–36)
MCHC RBC-ENTMCNC: 33.3 PG — SIGNIFICANT CHANGE UP (ref 27–34)
MCV RBC AUTO: 100 FL — SIGNIFICANT CHANGE UP (ref 80–100)
MCV RBC AUTO: 99.8 FL — SIGNIFICANT CHANGE UP (ref 80–100)
NITRITE UR-MCNC: NEGATIVE — SIGNIFICANT CHANGE UP
PH UR: 7 — SIGNIFICANT CHANGE UP (ref 5–8)
PHOSPHATE SERPL-MCNC: 3.1 MG/DL — SIGNIFICANT CHANGE UP (ref 2.5–4.5)
PLATELET # BLD AUTO: 181 K/UL — SIGNIFICANT CHANGE UP (ref 150–400)
PLATELET # BLD AUTO: 192 K/UL — SIGNIFICANT CHANGE UP (ref 150–400)
POTASSIUM SERPL-MCNC: 3.9 MMOL/L — SIGNIFICANT CHANGE UP (ref 3.5–5.3)
POTASSIUM SERPL-SCNC: 3.9 MMOL/L — SIGNIFICANT CHANGE UP (ref 3.5–5.3)
PROCALCITONIN SERPL-MCNC: 0.86 NG/ML — HIGH (ref 0–0.04)
PROLACTIN SERPL-MCNC: 13.2 NG/ML — SIGNIFICANT CHANGE UP (ref 3.4–24.1)
PROT SERPL-MCNC: 5.4 G/DL — LOW (ref 6–8.3)
PROT UR-MCNC: SIGNIFICANT CHANGE UP
PROTHROM AB SERPL-ACNC: 12 SEC — SIGNIFICANT CHANGE UP (ref 9.8–12.7)
RBC # BLD: 3.13 M/UL — LOW (ref 3.8–5.2)
RBC # BLD: 3.52 M/UL — LOW (ref 3.8–5.2)
RBC # FLD: 13.2 % — SIGNIFICANT CHANGE UP (ref 10.3–14.5)
RBC # FLD: 13.3 % — SIGNIFICANT CHANGE UP (ref 10.3–14.5)
RBC CASTS # UR COMP ASSIST: ABNORMAL /HPF (ref 0–2)
SODIUM SERPL-SCNC: 141 MMOL/L — SIGNIFICANT CHANGE UP (ref 135–145)
SP GR SPEC: 1.02 — SIGNIFICANT CHANGE UP (ref 1.01–1.02)
TROPONIN T SERPL-MCNC: 0.09 NG/ML — HIGH (ref 0–0.06)
TROPONIN T SERPL-MCNC: 0.16 NG/ML — HIGH (ref 0–0.06)
TROPONIN T SERPL-MCNC: 0.28 NG/ML — HIGH (ref 0–0.06)
TSH SERPL-MCNC: 2.25 UIU/ML — SIGNIFICANT CHANGE UP (ref 0.27–4.2)
UROBILINOGEN FLD QL: NEGATIVE — SIGNIFICANT CHANGE UP
WBC # BLD: 24 K/UL — HIGH (ref 3.8–10.5)
WBC # BLD: 33.9 K/UL — HIGH (ref 3.8–10.5)
WBC # FLD AUTO: 24 K/UL — HIGH (ref 3.8–10.5)
WBC # FLD AUTO: 33.9 K/UL — HIGH (ref 3.8–10.5)
WBC UR QL: SIGNIFICANT CHANGE UP /HPF (ref 0–5)

## 2017-07-14 PROCEDURE — 70450 CT HEAD/BRAIN W/O DYE: CPT | Mod: 26

## 2017-07-14 PROCEDURE — 95819 EEG AWAKE AND ASLEEP: CPT | Mod: 26

## 2017-07-14 PROCEDURE — 95957 EEG DIGITAL ANALYSIS: CPT | Mod: 26

## 2017-07-14 PROCEDURE — 93010 ELECTROCARDIOGRAM REPORT: CPT

## 2017-07-14 PROCEDURE — 99231 SBSQ HOSP IP/OBS SF/LOW 25: CPT

## 2017-07-14 PROCEDURE — 99291 CRITICAL CARE FIRST HOUR: CPT

## 2017-07-14 RX ORDER — CEFEPIME 1 G/1
1000 INJECTION, POWDER, FOR SOLUTION INTRAMUSCULAR; INTRAVENOUS EVERY 8 HOURS
Qty: 0 | Refills: 0 | Status: DISCONTINUED | OUTPATIENT
Start: 2017-07-14 | End: 2017-07-16

## 2017-07-14 RX ORDER — VANCOMYCIN HCL 1 G
750 VIAL (EA) INTRAVENOUS EVERY 12 HOURS
Qty: 0 | Refills: 0 | Status: DISCONTINUED | OUTPATIENT
Start: 2017-07-14 | End: 2017-07-15

## 2017-07-14 RX ORDER — FOSPHENYTOIN 50 MG/ML
950 INJECTION INTRAMUSCULAR; INTRAVENOUS ONCE
Qty: 0 | Refills: 0 | Status: COMPLETED | OUTPATIENT
Start: 2017-07-14 | End: 2017-07-14

## 2017-07-14 RX ORDER — MIDAZOLAM HYDROCHLORIDE 1 MG/ML
6 INJECTION, SOLUTION INTRAMUSCULAR; INTRAVENOUS
Qty: 100 | Refills: 0 | Status: DISCONTINUED | OUTPATIENT
Start: 2017-07-14 | End: 2017-07-16

## 2017-07-14 RX ORDER — MIDAZOLAM HYDROCHLORIDE 1 MG/ML
2 INJECTION, SOLUTION INTRAMUSCULAR; INTRAVENOUS ONCE
Qty: 0 | Refills: 0 | Status: DISCONTINUED | OUTPATIENT
Start: 2017-07-14 | End: 2017-07-14

## 2017-07-14 RX ORDER — CEFEPIME 1 G/1
1000 INJECTION, POWDER, FOR SOLUTION INTRAMUSCULAR; INTRAVENOUS ONCE
Qty: 0 | Refills: 0 | Status: COMPLETED | OUTPATIENT
Start: 2017-07-14 | End: 2017-07-14

## 2017-07-14 RX ORDER — PROPOFOL 10 MG/ML
5 INJECTION, EMULSION INTRAVENOUS
Qty: 500 | Refills: 0 | Status: DISCONTINUED | OUTPATIENT
Start: 2017-07-14 | End: 2017-07-15

## 2017-07-14 RX ORDER — NOREPINEPHRINE BITARTRATE/D5W 8 MG/250ML
0.01 PLASTIC BAG, INJECTION (ML) INTRAVENOUS
Qty: 8 | Refills: 0 | Status: DISCONTINUED | OUTPATIENT
Start: 2017-07-14 | End: 2017-07-17

## 2017-07-14 RX ORDER — CEFEPIME 1 G/1
INJECTION, POWDER, FOR SOLUTION INTRAMUSCULAR; INTRAVENOUS
Qty: 0 | Refills: 0 | Status: DISCONTINUED | OUTPATIENT
Start: 2017-07-14 | End: 2017-07-16

## 2017-07-14 RX ORDER — SODIUM CHLORIDE 9 MG/ML
1000 INJECTION INTRAMUSCULAR; INTRAVENOUS; SUBCUTANEOUS ONCE
Qty: 0 | Refills: 0 | Status: COMPLETED | OUTPATIENT
Start: 2017-07-14 | End: 2017-07-14

## 2017-07-14 RX ORDER — FOSPHENYTOIN 50 MG/ML
100 INJECTION INTRAMUSCULAR; INTRAVENOUS EVERY 8 HOURS
Qty: 0 | Refills: 0 | Status: DISCONTINUED | OUTPATIENT
Start: 2017-07-15 | End: 2017-07-21

## 2017-07-14 RX ADMIN — Medication 75 MICROGRAM(S): at 05:01

## 2017-07-14 RX ADMIN — CARBIDOPA AND LEVODOPA 2 TABLET(S): 25; 100 TABLET ORAL at 12:09

## 2017-07-14 RX ADMIN — PANTOPRAZOLE SODIUM 40 MILLIGRAM(S): 20 TABLET, DELAYED RELEASE ORAL at 12:09

## 2017-07-14 RX ADMIN — MIDAZOLAM HYDROCHLORIDE 2 MILLIGRAM(S): 1 INJECTION, SOLUTION INTRAMUSCULAR; INTRAVENOUS at 04:54

## 2017-07-14 RX ADMIN — HEPARIN SODIUM 5000 UNIT(S): 5000 INJECTION INTRAVENOUS; SUBCUTANEOUS at 15:00

## 2017-07-14 RX ADMIN — SODIUM CHLORIDE 2000 MILLILITER(S): 9 INJECTION INTRAMUSCULAR; INTRAVENOUS; SUBCUTANEOUS at 16:30

## 2017-07-14 RX ADMIN — CARBIDOPA AND LEVODOPA 2 TABLET(S): 25; 100 TABLET ORAL at 17:38

## 2017-07-14 RX ADMIN — HEPARIN SODIUM 5000 UNIT(S): 5000 INJECTION INTRAVENOUS; SUBCUTANEOUS at 21:18

## 2017-07-14 RX ADMIN — Medication 100 MILLIGRAM(S): at 05:01

## 2017-07-14 RX ADMIN — CARBIDOPA AND LEVODOPA 2 TABLET(S): 25; 100 TABLET ORAL at 23:02

## 2017-07-14 RX ADMIN — HEPARIN SODIUM 5000 UNIT(S): 5000 INJECTION INTRAVENOUS; SUBCUTANEOUS at 05:01

## 2017-07-14 RX ADMIN — FENTANYL CITRATE 8 MICROGRAM(S)/KG/HR: 50 INJECTION INTRAVENOUS at 12:10

## 2017-07-14 RX ADMIN — CEFEPIME 100 MILLIGRAM(S): 1 INJECTION, POWDER, FOR SOLUTION INTRAMUSCULAR; INTRAVENOUS at 21:18

## 2017-07-14 RX ADMIN — FENTANYL CITRATE 8 MICROGRAM(S)/KG/HR: 50 INJECTION INTRAVENOUS at 04:54

## 2017-07-14 RX ADMIN — SODIUM CHLORIDE 3000 MILLILITER(S): 9 INJECTION INTRAMUSCULAR; INTRAVENOUS; SUBCUTANEOUS at 06:10

## 2017-07-14 RX ADMIN — CARBIDOPA AND LEVODOPA 2 TABLET(S): 25; 100 TABLET ORAL at 08:35

## 2017-07-14 RX ADMIN — MIDAZOLAM HYDROCHLORIDE 6 MG/HR: 1 INJECTION, SOLUTION INTRAMUSCULAR; INTRAVENOUS at 15:30

## 2017-07-14 RX ADMIN — CARBIDOPA AND LEVODOPA 2 TABLET(S): 25; 100 TABLET ORAL at 03:16

## 2017-07-14 RX ADMIN — FOSPHENYTOIN 138 MILLIGRAM(S) PE: 50 INJECTION INTRAMUSCULAR; INTRAVENOUS at 15:40

## 2017-07-14 RX ADMIN — Medication 2 MILLIGRAM(S): at 12:08

## 2017-07-14 RX ADMIN — Medication 0.91 MICROGRAM(S)/KG/MIN: at 17:38

## 2017-07-14 RX ADMIN — Medication 150 MILLIGRAM(S): at 23:01

## 2017-07-14 RX ADMIN — SODIUM CHLORIDE 4000 MILLILITER(S): 9 INJECTION INTRAMUSCULAR; INTRAVENOUS; SUBCUTANEOUS at 03:24

## 2017-07-14 RX ADMIN — CEFEPIME 100 MILLIGRAM(S): 1 INJECTION, POWDER, FOR SOLUTION INTRAMUSCULAR; INTRAVENOUS at 12:10

## 2017-07-14 RX ADMIN — CARBIDOPA AND LEVODOPA 2 TABLET(S): 25; 100 TABLET ORAL at 20:41

## 2017-07-14 RX ADMIN — Medication 4 MILLIGRAM(S): at 00:15

## 2017-07-14 RX ADMIN — Medication 2 MILLIGRAM(S): at 10:00

## 2017-07-14 RX ADMIN — Medication 150 MILLIGRAM(S): at 12:10

## 2017-07-14 NOTE — CONSULT NOTE ADULT - ASSESSMENT
62-y F with cognitive impairment, TBI c/b ICH, Drug-induced Parkinson's Disease (neuroleptic use), nonverbal at baseline, s/p PEG placement for aspiration, Depression, Schizophrenia, Hypothyroidism, GERD, BIBEMS for evaluation of epistaxis. Patient had intubation. She had episodes of desaturation and hypotension ( SBP ranging to 30's as per MICU progression note). She had eye deviation and intermittent stiffening activity. PE consisted with increased tone, intermittent stiffening and eye deviation noted along with her baseline rhythmic movement of the hands. Short 20 minutes EEG was ordered by MICU team as a part of evaluation.
61yo with right anterior epistaxis controlled with nasal packing (rapid rhino) intubated for hypoxia and hypocapnia

## 2017-07-14 NOTE — CONSULT NOTE ADULT - SUBJECTIVE AND OBJECTIVE BOX
HPI    62-y F with cognitive impairment, TBI c/b ICH, Drug-induced Parkinson's Disease (neuroleptic use), nonverbal at baseline, s/p PEG placement for aspiration, Depression, Schizophrenia, Hypothyroidism, GERD, BIBEMS for evaluation of epistaxis.  Patient has had multiple hospitalization in past several years for pneumonia due to aspiration.      At home patient noticed blood in her airway while doing suctioning.  (requires chronic suctioning to maintain airway).  She went to ER and there was noted to have acute desaturation and altered mental status, placed on BIPAP. However, pt was emergently intubated in setting 2/2 to hypoxia and hypercapnia. Of note patient has a history of nasal surgery in the past/ unclear exactly what was performed.     VS in the ED:   T:?, HR:116, BP:177/102, RR: 32, 83% O2 on BIPAP    Patient was not able to provide any history as she was intubated. When we reach to examine her, she had intermittent tonic activity followed by clonic activity. Her eyes were deviated to the right and upward.       MEDICATIONS  (STANDING):  carbidopa/levodopa  25/100 2 Tablet(s) Oral every 4 hours  fentaNYL   Infusion 3.556 MICROgram(s)/kG/Hr (8 mL/Hr) IV Continuous <Continuous>  heparin  Injectable 5000 Unit(s) SubCutaneous every 8 hours  levothyroxine 75 MICROGram(s) Oral daily  pantoprazole   Suspension 40 milliGRAM(s) Oral before breakfast  norepinephrine Infusion 0.01 MICROgram(s)/kG/Min (0.909 mL/Hr) IV Continuous <Continuous>  propofol Infusion 5 MICROgram(s)/kG/Min (1.455 mL/Hr) IV Continuous <Continuous>  LORazepam     Tablet 2 milliGRAM(s) Oral once  LORazepam   Injectable 2 milliGRAM(s) IV Push once  vancomycin  IVPB 750 milliGRAM(s) IV Intermittent every 12 hours  cefepime  IVPB 1000 milliGRAM(s) IV Intermittent once  cefepime  IVPB 1000 milliGRAM(s) IV Intermittent every 8 hours  cefepime  IVPB   IV Intermittent     MEDICATIONS  (PRN):    PAST MEDICAL & SURGICAL HISTORY:  Cognitive impairment  Traumatic brain injury, without loss of consciousness, sequela  Mitral valve prolapse  Major depressive disorder, recurrent episode, unspecified severity  Gastroesophageal reflux disease, esophagitis presence not specified  Hypothyroidism  Depression  Incontinence of urine  Parkinson's disease (tremor, stiffness, slow motion, unstable posture)  Scoliosis  Appendix disease  Intestinal disorder  Pharynx disease  Tonsil and adenoid disease, chronic  Hernia    FAMILY HISTORY:  Family history of arthritis (Sibling)  Family history of Alzheimer's disease    Allergies    sulfa drugs (Unknown)    Intolerances        SHx - No smoking, No ETOH, No drug abuse      Review of Systems:  CONSTITUTIONAL:  No weight loss, fever, chills, weakness or fatigue.  HEENT:  Eyes:  No visual loss, blurred vision, double vision or yellow sclerae. Ears, Nose, Throat:  No hearing loss, sneezing, congestion, runny nose or sore throat.  SKIN:  No rash or itching.  CARDIOVASCULAR:  No chest pain, chest pressure or chest discomfort. No palpitations or edema.  RESPIRATORY:  No shortness of breath, cough or sputum.  GASTROINTESTINAL:  No anorexia, nausea, vomiting or diarrhea. No abdominal pain or blood.  GENITOURINARY:  NO Burning on urination.   NEUROLOGICAL: See HPI  MUSCULOSKELETAL:  No muscle, back pain, joint pain or stiffness.  HEMATOLOGIC:  No anemia, bleeding or bruising.  LYMPHATICS:  No enlarged nodes. No history of splenectomy.  PSYCHIATRIC:  No history of depression or anxiety.  ENDOCRINOLOGIC:  No reports of sweating, cold or heat intolerance. No polyuria or polydipsia.  ALLERGIES:  No history of asthma, hives, eczema or rhinitis.        Vital Signs Last 24 Hrs  T(C): 37.3 (14 Jul 2017 08:00), Max: 37.3 (14 Jul 2017 08:00)  T(F): 99.1 (14 Jul 2017 08:00), Max: 99.1 (14 Jul 2017 08:00)  HR: 66 (14 Jul 2017 10:15) (53 - 106)  BP: 97/55 (14 Jul 2017 10:00) (37/20 - 200/89)  BP(mean): 70 (14 Jul 2017 10:00) (25 - 128)  RR: 20 (14 Jul 2017 10:15) (19 - 45)  SpO2: 98% (14 Jul 2017 10:15) (73% - 100%)    General Exam:     neurological examination :     she was sedated with fentanyl and her examination is limited   no facial asymmetry  Pupil equal pin point          07-14    141  |  105  |  26<H>  ----------------------------<  130<H>  3.9   |  22  |  0.58    Ca    8.0<L>      14 Jul 2017 01:35  Phos  3.1     07-14  Mg     1.8     07-14    TPro  5.4<L>  /  Alb  2.9<L>  /  TBili  0.3  /  DBili  x   /  AST  30  /  ALT  5<L>  /  AlkPhos  49  07-14    07-14    141  |  105  |  26<H>  ----------------------------<  130<H>  3.9   |  22  |  0.58    Ca    8.0<L>      14 Jul 2017 01:35  Phos  3.1     07-14  Mg     1.8     07-14    TPro  5.4<L>  /  Alb  2.9<L>  /  TBili  0.3  /  DBili  x   /  AST  30  /  ALT  5<L>  /  AlkPhos  49  07-14                          10.4   24.0  )-----------( 181      ( 14 Jul 2017 10:35 )             31.4       Radiology    CT  MRI  EKG:  tele:  TTE:  EEG: HPI    62-y F with cognitive impairment, TBI c/b ICH, Drug-induced Parkinson's Disease (neuroleptic use), nonverbal at baseline, s/p PEG placement for aspiration, Depression, Schizophrenia, Hypothyroidism, GERD, BIBEMS for evaluation of epistaxis.  Patient has had multiple hospitalization in past several years for pneumonia due to aspiration.      At home patient noticed blood in her airway while doing suctioning.  (requires chronic suctioning to maintain airway).  She went to ER and there was noted to have acute desaturation and altered mental status, placed on BIPAP. However, pt was emergently intubated in setting 2/2 to hypoxia and hypercapnia. Of note patient has a history of nasal surgery in the past/ unclear exactly what was performed.     VS in the ED:   T:?, HR:116, BP:177/102, RR: 32, 83% O2 on BIPAP    Patient was not able to provide any history as she was intubated. When we reach to examine her, she had intermittent tonic activity followed by clonic activity. Her eyes were deviated to the right and upward. Overnight patient had episodes of hypotension, ranging SBP in 30's. Mother was bed side she reported that at baseline she has this abnormal movements but intermittent stiffening and eye deviation are new.       MEDICATIONS  (STANDING):  carbidopa/levodopa  25/100 2 Tablet(s) Oral every 4 hours  fentaNYL   Infusion 3.556 MICROgram(s)/kG/Hr (8 mL/Hr) IV Continuous <Continuous>  heparin  Injectable 5000 Unit(s) SubCutaneous every 8 hours  levothyroxine 75 MICROGram(s) Oral daily  pantoprazole   Suspension 40 milliGRAM(s) Oral before breakfast  norepinephrine Infusion 0.01 MICROgram(s)/kG/Min (0.909 mL/Hr) IV Continuous <Continuous>  propofol Infusion 5 MICROgram(s)/kG/Min (1.455 mL/Hr) IV Continuous <Continuous>  LORazepam     Tablet 2 milliGRAM(s) Oral once  LORazepam   Injectable 2 milliGRAM(s) IV Push once  vancomycin  IVPB 750 milliGRAM(s) IV Intermittent every 12 hours  cefepime  IVPB 1000 milliGRAM(s) IV Intermittent once  cefepime  IVPB 1000 milliGRAM(s) IV Intermittent every 8 hours  cefepime  IVPB   IV Intermittent     MEDICATIONS  (PRN):    PAST MEDICAL & SURGICAL HISTORY:  Cognitive impairment  Traumatic brain injury, without loss of consciousness, sequela  Mitral valve prolapse  Major depressive disorder, recurrent episode, unspecified severity  Gastroesophageal reflux disease, esophagitis presence not specified  Hypothyroidism  Depression  Incontinence of urine  Parkinson's disease (tremor, stiffness, slow motion, unstable posture)  Scoliosis  Appendix disease  Intestinal disorder  Pharynx disease  Tonsil and adenoid disease, chronic  Hernia    FAMILY HISTORY:  Family history of arthritis (Sibling)  Family history of Alzheimer's disease    Allergies    sulfa drugs (Unknown)    Intolerances        SHx - No smoking, No ETOH, No drug abuse      Review of Systems:  CONSTITUTIONAL:  No weight loss, fever, chills, weakness or fatigue.  HEENT:  Eyes:  No visual loss, blurred vision, double vision or yellow sclerae. Ears, Nose, Throat:  No hearing loss, sneezing, congestion, runny nose or sore throat.  SKIN:  No rash or itching.  CARDIOVASCULAR:  No chest pain, chest pressure or chest discomfort. No palpitations or edema.  RESPIRATORY:  No shortness of breath, cough or sputum.  GASTROINTESTINAL:  No anorexia, nausea, vomiting or diarrhea. No abdominal pain or blood.  GENITOURINARY:  NO Burning on urination.   NEUROLOGICAL: See HPI  MUSCULOSKELETAL:  No muscle, back pain, joint pain or stiffness.  HEMATOLOGIC:  No anemia, bleeding or bruising.  LYMPHATICS:  No enlarged nodes. No history of splenectomy.  PSYCHIATRIC:  No history of depression or anxiety.  ENDOCRINOLOGIC:  No reports of sweating, cold or heat intolerance. No polyuria or polydipsia.  ALLERGIES:  No history of asthma, hives, eczema or rhinitis.        Vital Signs Last 24 Hrs  T(C): 37.3 (14 Jul 2017 08:00), Max: 37.3 (14 Jul 2017 08:00)  T(F): 99.1 (14 Jul 2017 08:00), Max: 99.1 (14 Jul 2017 08:00)  HR: 66 (14 Jul 2017 10:15) (53 - 106)  BP: 97/55 (14 Jul 2017 10:00) (37/20 - 200/89)  BP(mean): 70 (14 Jul 2017 10:00) (25 - 128)  RR: 20 (14 Jul 2017 10:15) (19 - 45)  SpO2: 98% (14 Jul 2017 10:15) (73% - 100%)    General Exam:     neurological examination :     she was sedated with fentanyl and her examination is limited   no facial asymmetry  Pupil equal pin point    oculocephalic reflex was absent  tone increased in her upper and lower extremity   resting tremor seen in bilateral extremity, more in left   she was not withdrawing to painful stimuli       07-14    141  |  105  |  26<H>  ----------------------------<  130<H>  3.9   |  22  |  0.58    Ca    8.0<L>      14 Jul 2017 01:35  Phos  3.1     07-14  Mg     1.8     07-14    TPro  5.4<L>  /  Alb  2.9<L>  /  TBili  0.3  /  DBili  x   /  AST  30  /  ALT  5<L>  /  AlkPhos  49  07-14    07-14    141  |  105  |  26<H>  ----------------------------<  130<H>  3.9   |  22  |  0.58    Ca    8.0<L>      14 Jul 2017 01:35  Phos  3.1     07-14  Mg     1.8     07-14    TPro  5.4<L>  /  Alb  2.9<L>  /  TBili  0.3  /  DBili  x   /  AST  30  /  ALT  5<L>  /  AlkPhos  49  07-14                          10.4   24.0  )-----------( 181      ( 14 Jul 2017 10:35 )             31.4       Radiology    no recent imaging

## 2017-07-14 NOTE — CHART NOTE - NSCHARTNOTEFT_GEN_A_CORE
EEG shows nonconvulsive status. recommend load dilantin with 20mg/kg then 100mg tid. Start Versed drip 6mg/hr. D/w MICU resident

## 2017-07-14 NOTE — PROGRESS NOTE ADULT - PROBLEM SELECTOR PLAN 1
-packing removal on sunday   - nasal saline two sprays to b/l nares tid   - avoid nasal trauma  - bacitracin ointment to b/l nares bid  - continue antibiotics until packing is removed

## 2017-07-14 NOTE — PROGRESS NOTE ADULT - ASSESSMENT
61yo F with recurrent epistaxis controlled by ER yesterday am with rapid rhino (nasal packing) and intubated for hypoxia.

## 2017-07-14 NOTE — CONSULT NOTE ADULT - ATTENDING COMMENTS
Seen and examined on rounds with housestaff.  She is currently intubated and sedated on versed.  Arousable to voice and tactile stimuli.  Unable to follow commands, though tracks across the room.  Multiple seizure on rEEG yesterday likely unmasked by sepsis in the setting of R frontal encephalomalacia.  At the bedside, there was fast activity, without epileptiform discharges.  Will f/u official overnight read.  Keep dilantin level 15-20, c/w fosphenytoin 100 TID.  Would keep sedated at least an additional 24 hours after last seizure.

## 2017-07-14 NOTE — DIETITIAN INITIAL EVALUATION ADULT. - OTHER INFO
Pt seen for: MICU length of stay.   Adm dx: epistaxis, respiratory failure, intubated 7/13    GI issues: no N/V/D   Last BM: fecal incontinence today     Food allergies: NKFA   Vit/supplement PTA: fish oil

## 2017-07-14 NOTE — PROGRESS NOTE ADULT - SUBJECTIVE AND OBJECTIVE BOX
POD/STATUS POST/ENT ISSUE: Recurrent epistaxis     INTERVAL HPI: 63yo F with recurrent epistaxis controlled by ER yesterday am with rapid rhino (nasal packing) and intubated for hypoxia. no episodes of epistaxis overnight, bp controlled.     Vital Signs Last 24 Hrs  T(C): 37.3 (14 Jul 2017 08:00), Max: 37.3 (14 Jul 2017 08:00)  T(F): 99.1 (14 Jul 2017 08:00), Max: 99.1 (14 Jul 2017 08:00)  HR: 66 (14 Jul 2017 10:15) (53 - 116)  BP: 97/55 (14 Jul 2017 10:00) (37/20 - 200/89)  BP(mean): 70 (14 Jul 2017 10:00) (25 - 128)  RR: 20 (14 Jul 2017 10:15) (19 - 45)  SpO2: 98% (14 Jul 2017 10:15) (73% - 100%)    LABS:                        11.7   33.9  )-----------( 192      ( 14 Jul 2017 01:35 )             35.1       PHYSICAL EXAM:  Gen: NAD, well-developed  Head: Normocephalic, Atraumatic  Face: no edema/erythema/fluctuance, parotid glands soft without mass  Eyes: PERRL, EOMI, no scleral injection  Nose: Rt Rapid rhino in place, +dried blood around nasal packing, no active bleeding  no discharge  Mouth: No Stridor / Drooling / Trismus.  Mucosa moist, tongue/uvula midline, oropharynx clear  Neck: Flat, supple, no lymphadenopathy, trachea midline, no masses  Resp: breathing easily, no stridor  CV: no peripheral edema/cyanosis

## 2017-07-14 NOTE — EEG REPORT - NS EEG TEXT BOX
[facility]  Report of Routine EEG with Video  And Report of DigitalCompressed Spectral Array Analysis    [facility]: [address], [address] [place] [address], Phone: [phone]  [facility]: [id number] [facility], [place] [address], Phone: [phone]  Office: [address] [facility], [place] [address], [place] [address], Phone: [phone]    Study Date: 7/14/2017		    Technical Information:					  On Instrument: -  Placement and Labeling of Electrodes:  The EEG was performed utilizing 20 channels referential EEG connections (coronal over temporal over parasagittal montage) using all standard 10-20 electrode placements with EKG.  Recording was at a sampling rate of 256 samples per second per channel.  Time synchronized digital video recording was done simultaneously with EEG recording.  A low light infrared camera was used for low light recording.  Clif and seizure detection algorithms were utilized.  CSA Technical Component:  Quantitative EEG analysis using a separate Compressed Spectral Array (CSA) software package was conducted in real-time and run at bedside after set up by the technician, digitally displaying the power of electrographic frequencies included in the 1-30Hz band using a graded color map.  This data was reviewed and interpreted independently, and is reported in a separate section below.    History:  h/o  cognitive impairment, TBI, Drug-induced  Parkinson's disease, depression, schziophrenia  p/w ams    Medication	  No Data.	    Study Interpretation:    FINDINGS:  The background was continuous but disorganized. The PDR was not present. The background consisted of continuous irregular theta and delta frequency activity diffusely. There was accentuation of fast frequency activity over the right frontal region (Breach Rhythm)    Sleep Background:  Sleep architecture was not observed in this study     Epileptiform Activity:   Continuous quasi-periodic spike wave discharges over the right centro-parietal region with phase reversal at C4>P8 occurring at a frequency of 2-4Hz.     Events:  Multiple electrographic seizures (no clinical changes) originating from the right centro-parietal region. Each seizure started as increased frequency of PLED activity to 6Hz over the right centro-parietal region, with spread to the right temporal region and the emergence of increased discharges over the left frontal region. Each seizure lasted from 30-60 seconds in duration. Times listed below.     11:12:52  	Seizure  11:14:42  	Seizure  11:16:02  	Seizure  11:17:02  	Seizure  11:18:32  	Seizure  11:19:22  	Seizure  11:20:52  	Seizure  11:22:32  	Seizure  11:24:02  	Seizure  11:25:22  	Seizure  11:26:32  	Seizure  11:28:42  	Seizure  Activation Procedures:   -Hyperventilation was not performed.    -Photic stimulation was not performed.     Artifacts:  Intermittent myogenic and movement artifacts were noted.    ECG:  The heart rate on single channel ECG was predominantly between 60-70 BPM.    Compressed Spectral Array Digital Analysis    FINDINGS:  Compressed Spectral Array (CSA) data was reviewed separately and correlated with the electroencephalographic findings detailed above.  CSA showed a variable spectral pattern.  Areas of increased power in particular were reviewed in detail, and compared with the raw EEG data.  Areas of abrupt increases in spectral power were reviewed to exclude seizures, and were determined to be artifactual in nature except during seizures above    The relative ratio of the power of delta range frequencies and faster frequencies remained stable over the course of the study.  There was no definitive increase in the relative power in the delta frequency spectrum apparent in the left hemisphere versus the right hemisphere.      Compressed Spectral Array (Digital Analysis) Summary/ Impression:  No persistent hemispheric asymmetry.  Intermittent areas of increased power reviewed, without definite epileptiform activity associated on CSA except during seizures above    EEG Classification / Summary:  Abnormal Routine EEG Study:     -Numerous electrographic seizures originating from the right centro-parietal region (times above)  -Continuous spike wave discharges, quasi-periodic, focal, right centro-temporal region  -Breach rhythm, right frontal region   -Disorganization  -Background slowing, generalized     Clinical Impression:  Numerous electrographic seizures originating from the right centro-parietal region (see times above) in a crescendo-decrescendo pattern indicative of non-convulsive status epilepticus. Otherwise, there is moderate nonspecific diffuse or multifocal cerebral dysfunction. Breech effect due to right frontal skull defect is noted.        Cora Rios DO  Fellow in Neurophysiology/Epilepsy, [facility]      Harshad Yeager MD			    Attending Physician, [facility]

## 2017-07-14 NOTE — DIETITIAN INITIAL EVALUATION ADULT. - ENERGY NEEDS
Ht: 68"   Wt: 107  BMI:16.3  kg/m2   IBW: 140 (+/-10%)     76% IBW  Edema: 1+ dependent/generalized   Skin: no pressure injuries

## 2017-07-14 NOTE — PROGRESS NOTE ADULT - ASSESSMENT
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  Plan: Patient with long history of PD from neuroleptic use; patient severe rigidity, on high-dose sinemet, with recurrent aspiration with prior hospitalization.  - continue sinemet  - non-oral nutrition with tube feeds  - goals of care discussion in the past with family (patient refused hospice placement in the past). Mother of the patient has been making medical decisions but has recently suffered a stroke.    #Respiratory  Acute respiratory failure with hypoxia and hypercapnia.  Plan: - Likely 2/2 Epistaxis, no PNA visualized on CXR.  US showed A-line predominant   - - wean FiO2, keep Sat >92%  - frequent suctioning  -  Check ABG and adjust Vent settings as needed  - ENT evaluation for epistaxis- on board  - Protonix while on vent.     #Cardiac  Continue to follow troponin trends, f/u on EKG    #ID  WBC count 33.9 , Procalcitonin 0.86, on Ancef    #Heme  Epistaxis, recurrent.  Plan: - CBC stable. Monitor CBC daily  - Airway protected with ET Tube.    #Endo  Hypothyroidism.  Plan: Continue synthroid 75 mcg/daily  Check TSH level.     #Prophylactic  Plan: HSQ. 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  Plan: Patient with long history of PD from neuroleptic use; patient severe rigidity, on high-dose sinemet, with recurrent aspiration with prior hospitalization.  - continue sinemet  - non-oral nutrition with tube feeds  - goals of care discussion in the past with family (patient refused hospice placement in the past). Mother of the patient has been making medical decisions but has recently suffered a stroke.  -Pt. had facial twitching and upward eye deviation, Neurology was consulted for possible epileptic activity    #Respiratory  Acute respiratory failure with hypoxia and hypercapnia.  Plan: - Likely 2/2 Epistaxis, no PNA visualized on CXR.  US showed A-line predominant   - - wean FiO2, keep Sat >92%  - frequent suctioning  -  Check ABG and adjust Vent settings as needed  - ENT evaluation for epistaxis- on board  - Protonix while on vent.     #Cardiac  Continue to follow troponin trends, No acute concerns on EKG    #ID  WBC count 33.9 , Procalcitonin 0.86, D/C Ancef, started on Cefepime/ Vanco, WBC count came down to 24     #Heme  Epistaxis, recurrent.  Plan: - CBC stable. Monitor CBC daily  - Airway protected with ET Tube.    #Endo  Hypothyroidism.  Plan: Continue synthroid 75 mcg/daily  Check TSH level.     #Prophylactic  Plan: HSQ.

## 2017-07-14 NOTE — PROGRESS NOTE ADULT - SUBJECTIVE AND OBJECTIVE BOX
CHIEF COMPLAINT:    Interval Events:    REVIEW OF SYSTEMS:  Constitutional: [ ] negative [ ] fevers [ ] chills [ ] weight loss [ ] weight gain  HEENT: [ ] negative [ ] dry eyes [ ] eye irritation [ ] postnasal drip [ ] nasal congestion  CV: [ ] negative  [ ] chest pain [ ] orthopnea [ ] palpitations [ ] murmur  Resp: [ ] negative [ ] cough [ ] shortness of breath [ ] dyspnea [ ] wheezing [ ] sputum [ ] hemoptysis  GI: [ ] negative [ ] nausea [ ] vomiting [ ] diarrhea [ ] constipation [ ] abd pain [ ] dysphagia   : [ ] negative [ ] dysuria [ ] nocturia [ ] hematuria [ ] increased urinary frequency  Musculoskeletal: [ ] negative [ ] back pain [ ] myalgias [ ] arthralgias [ ] fracture  Skin: [ ] negative [ ] rash [ ] itch  Neurological: [ ] negative [ ] headache [ ] dizziness [ ] syncope [ ] weakness [ ] numbness  Psychiatric: [ ] negative [ ] anxiety [ ] depression  Endocrine: [ ] negative [ ] diabetes [ ] thyroid problem  Hematologic/Lymphatic: [ ] negative [ ] anemia [ ] bleeding problem  Allergic/Immunologic: [ ] negative [ ] itchy eyes [ ] nasal discharge [ ] hives [ ] angioedema  [ ] All other systems negative  [ ] Unable to assess ROS because ________    OBJECTIVE:  ICU Vital Signs Last 24 Hrs  T(C): 36.7 (2017 04:00), Max: 36.7 (2017 04:00)  T(F): 98 (2017 04:00), Max: 98 (2017 04:00)  HR: 55 (2017 06:45) (53 - 116)  BP: 120/74 (2017 06:45) (37/20 - 200/89)  BP(mean): 92 (2017 06:45) (25 - 128)  ABP: --  ABP(mean): --  RR: 20 (2017 06:45) (20 - 43)  SpO2: 100% (2017 06:45) (83% - 100%)    Mode: AC/ CMV (Assist Control/ Continuous Mandatory Ventilation), RR (machine): 20, TV (machine): 400, FiO2: 30, PEEP: 5, ITime: 1, MAP: 12, PIP: 29    07-13 @ 07:  -   @ 07:00  --------------------------------------------------------  IN: 2959.8 mL / OUT: 0 mL / NET: 2959.8 mL      CAPILLARY BLOOD GLUCOSE          PHYSICAL EXAM:  General:   HEENT:   Lymph Nodes:  Neck:   Respiratory:   Cardiovascular:   Abdomen:   Extremities:   Skin:   Neurological:  Psychiatry:    LINES:    HOSPITAL MEDICATIONS:  heparin  Injectable 5000 Unit(s) SubCutaneous every 8 hours    ceFAZolin   IVPB   IV Intermittent   ceFAZolin   IVPB 1000 milliGRAM(s) IV Intermittent every 8 hours    norepinephrine Infusion 0.01 MICROgram(s)/kG/Min IV Continuous <Continuous>    levothyroxine 75 MICROGram(s) Oral daily      carbidopa/levodopa  25/100 2 Tablet(s) Oral every 4 hours  fentaNYL   Infusion 3.556 MICROgram(s)/kG/Hr IV Continuous <Continuous>  propofol Infusion 5 MICROgram(s)/kG/Min IV Continuous <Continuous>    pantoprazole   Suspension 40 milliGRAM(s) Oral before breakfast        sodium chloride 0.9% Bolus 1000 milliLiter(s) IV Bolus once                LABS:                        11.7   33.9  )-----------( 192      ( 2017 01:35 )             35.1     Hgb Trend: 11.7<--, 14.4<--  07    141  |  105  |  26<H>  ----------------------------<  130<H>  3.9   |  22  |  0.58    Ca    8.0<L>      2017 01:35  Phos  3.1       Mg     1.8         TPro  5.4<L>  /  Alb  2.9<L>  /  TBili  0.3  /  DBili  x   /  AST  30  /  ALT  5<L>  /  AlkPhos  49  14    Creatinine Trend: 0.58<--, 0.92<--, 0.55<--  PT/INR - ( 2017 01:35 )   PT: 12.0 sec;   INR: 1.10 ratio         PTT - ( 2017 01:35 )  PTT:33.8 sec  Urinalysis Basic - ( 2017 02:55 )    Color: Yellow / Appearance: Clear / S.021 / pH: x  Gluc: x / Ketone: Trace  / Bili: Negative / Urobili: Negative   Blood: x / Protein: Trace / Nitrite: Negative   Leuk Esterase: Trace / RBC: 2-5 /HPF / WBC 2-5 /HPF   Sq Epi: x / Non Sq Epi: x / Bacteria: x      Arterial Blood Gas:   @ 01:34  7.50/32/112/24/99/2.2  ABG lactate: --  Arterial Blood Gas:   @ 13:27  7.38/46/284/26/99/1.4  ABG lactate: --    Venous Blood Gas:   @ 11:05  7.12/105/39/33/54  VBG Lactate: 3.6      MICROBIOLOGY:     RADIOLOGY:  [ ] Reviewed and interpreted by me    EKG: CHIEF COMPLAINT: Epistaxis    Interval Events: Overnight patients blood pressure dropped to systolic of 30s, she was given 1 liter of fluids and propofol was D/C. Earlier this morning her blood pressure  went down to 60s, she was given another liter of fluids. Troponin    REVIEW OF SYSTEMS:  Constitutional: [ ] negative [ ] fevers [ ] chills [ ] weight loss [ ] weight gain  HEENT: [ ] negative [ ] dry eyes [ ] eye irritation [ ] postnasal drip [ ] nasal congestion  CV: [ ] negative  [ ] chest pain [ ] orthopnea [ ] palpitations [ ] murmur  Resp: [ ] negative [ ] cough [ ] shortness of breath [ ] dyspnea [ ] wheezing [ ] sputum [ ] hemoptysis  GI: [ ] negative [ ] nausea [ ] vomiting [ ] diarrhea [ ] constipation [ ] abd pain [ ] dysphagia   : [ ] negative [ ] dysuria [ ] nocturia [ ] hematuria [ ] increased urinary frequency  Musculoskeletal: [ ] negative [ ] back pain [ ] myalgias [ ] arthralgias [ ] fracture  Skin: [ ] negative [ ] rash [ ] itch  Neurological: [ ] negative [ ] headache [ ] dizziness [ ] syncope [ ] weakness [ ] numbness  Psychiatric: [ ] negative [ ] anxiety [ ] depression  Endocrine: [ ] negative [ ] diabetes [ ] thyroid problem  Hematologic/Lymphatic: [ ] negative [ ] anemia [ ] bleeding problem  Allergic/Immunologic: [ ] negative [ ] itchy eyes [ ] nasal discharge [ ] hives [ ] angioedema  [ ] All other systems negative  [ ] Unable to assess ROS because ________    OBJECTIVE:  ICU Vital Signs Last 24 Hrs  T(C): 36.7 (2017 04:00), Max: 36.7 (2017 04:00)  T(F): 98 (2017 04:00), Max: 98 (2017 04:00)  HR: 55 (2017 06:45) (53 - 116)  BP: 120/74 (2017 06:45) (37/20 - 200/89)  BP(mean): 92 (2017 06:45) (25 - 128)  ABP: --  ABP(mean): --  RR: 20 (2017 06:45) (20 - 43)  SpO2: 100% (2017 06:45) (83% - 100%)    Mode: AC/ CMV (Assist Control/ Continuous Mandatory Ventilation), RR (machine): 20, TV (machine): 400, FiO2: 30, PEEP: 5, ITime: 1, MAP: 12, PIP: 29     @ 07:01  -  -14 @ 07:00  --------------------------------------------------------  IN: 2959.8 mL / OUT: 0 mL / NET: 2959.8 mL      CAPILLARY BLOOD GLUCOSE          PHYSICAL EXAM:  General:   HEENT:   Lymph Nodes:  Neck:   Respiratory:   Cardiovascular:   Abdomen:   Extremities:   Skin:   Neurological:  Psychiatry:    LINES:    HOSPITAL MEDICATIONS:  heparin  Injectable 5000 Unit(s) SubCutaneous every 8 hours    ceFAZolin   IVPB   IV Intermittent   ceFAZolin   IVPB 1000 milliGRAM(s) IV Intermittent every 8 hours    norepinephrine Infusion 0.01 MICROgram(s)/kG/Min IV Continuous <Continuous>    levothyroxine 75 MICROGram(s) Oral daily      carbidopa/levodopa  25/100 2 Tablet(s) Oral every 4 hours  fentaNYL   Infusion 3.556 MICROgram(s)/kG/Hr IV Continuous <Continuous>  propofol Infusion 5 MICROgram(s)/kG/Min IV Continuous <Continuous>    pantoprazole   Suspension 40 milliGRAM(s) Oral before breakfast        sodium chloride 0.9% Bolus 1000 milliLiter(s) IV Bolus once                LABS:                        11.7   33.9  )-----------( 192      ( 2017 01:35 )             35.1     Hgb Trend: 11.7<--, 14.4<--      141  |  105  |  26<H>  ----------------------------<  130<H>  3.9   |  22  |  0.58    Ca    8.0<L>      2017 01:35  Phos  3.1       Mg     1.8         TPro  5.4<L>  /  Alb  2.9<L>  /  TBili  0.3  /  DBili  x   /  AST  30  /  ALT  5<L>  /  AlkPhos  49      Creatinine Trend: 0.58<--, 0.92<--, 0.55<--  PT/INR - ( 2017 01:35 )   PT: 12.0 sec;   INR: 1.10 ratio         PTT - ( 2017 01:35 )  PTT:33.8 sec  Urinalysis Basic - ( 2017 02:55 )    Color: Yellow / Appearance: Clear / S.021 / pH: x  Gluc: x / Ketone: Trace  / Bili: Negative / Urobili: Negative   Blood: x / Protein: Trace / Nitrite: Negative   Leuk Esterase: Trace / RBC: 2-5 /HPF / WBC 2-5 /HPF   Sq Epi: x / Non Sq Epi: x / Bacteria: x      Arterial Blood Gas:   @ 01:34  7.50/32/112/24/99/2.2  ABG lactate: --  Arterial Blood Gas:   @ 13:27  7.38/46/284/26/99/1.4  ABG lactate: --    Venous Blood Gas:   @ 11:05  7.12/105/39/33/54  VBG Lactate: 3.6      MICROBIOLOGY:     RADIOLOGY:  [ ] Reviewed and interpreted by me    EKG: CHIEF COMPLAINT: Epistaxis    Interval Events: Overnight patients blood pressure dropped to systolic of 30s, she was given 1 liter of fluids and propofol was D/C. Earlier this morning her blood pressure  went down to 60s, she was given another liter of fluids. Troponin were elevated sent after first drop in BP. EKG results pending.    REVIEW OF SYSTEMS:  Constitutional: [ ] negative [ ] fevers [ ] chills [ ] weight loss [ ] weight gain  HEENT: [ ] negative [ ] dry eyes [ ] eye irritation [ ] postnasal drip [ ] nasal congestion  CV: [ ] negative  [ ] chest pain [ ] orthopnea [ ] palpitations [ ] murmur  Resp: [ ] negative [ ] cough [ ] shortness of breath [ ] dyspnea [ ] wheezing [ ] sputum [ ] hemoptysis  GI: [ ] negative [ ] nausea [ ] vomiting [ ] diarrhea [ ] constipation [ ] abd pain [ ] dysphagia   : [ ] negative [ ] dysuria [ ] nocturia [ ] hematuria [ ] increased urinary frequency  Musculoskeletal: [ ] negative [ ] back pain [ ] myalgias [ ] arthralgias [ ] fracture  Skin: [ ] negative [ ] rash [ ] itch  Neurological: [ ] negative [ ] headache [ ] dizziness [ ] syncope [ ] weakness [ ] numbness  Psychiatric: [ ] negative [ ] anxiety [ ] depression  Endocrine: [ ] negative [ ] diabetes [ ] thyroid problem  Hematologic/Lymphatic: [ ] negative [ ] anemia [ ] bleeding problem  Allergic/Immunologic: [ ] negative [ ] itchy eyes [ ] nasal discharge [ ] hives [ ] angioedema  [ ] All other systems negative  [X] Unable to assess ROS because patient is sedated and intubated.     OBJECTIVE:  ICU Vital Signs Last 24 Hrs  T(C): 36.7 (2017 04:00), Max: 36.7 (2017 04:00)  T(F): 98 (2017 04:00), Max: 98 (2017 04:00)  HR: 55 (2017 06:45) (53 - 116)  BP: 120/74 (2017 06:45) (37/20 - 200/89)  BP(mean): 92 (2017 06:45) (25 - 128)  ABP: --  ABP(mean): --  RR: 20 (2017 06:45) (20 - 43)  SpO2: 100% (2017 06:45) (83% - 100%)    Mode: AC/ CMV (Assist Control/ Continuous Mandatory Ventilation), RR (machine): 20, TV (machine): 400, FiO2: 30, PEEP: 5, ITime: 1, MAP: 12, PIP: 29    07-13 @ 07:01  -  07-14 @ 07:00  --------------------------------------------------------  IN: 2959.8 mL / OUT: 0 mL / NET: 2959.8 mL      CAPILLARY BLOOD GLUCOSE    Physical Exam:   General:  NAD, patient has some agitated movements  HEENT: PERRLA, EOMI, moist mucous membranes  Neurology: Intubated/Sedated  Respiratory: CTA B/L, normal respiratory effort, no wheezes, crackles, rales  CV: RRR, S1S2, no murmurs, rubs or gallops  Abdominal: Soft, NT, ND +BS. PEG tube in place  Extremities: No edema, + peripheral pulses  Skin: Dry/Warm    LINES: norepinephrine Infusion 0.01 MICROgram(s)/kG/Min IV Continuous <Continuous>    HOSPITAL MEDICATIONS:  heparin  Injectable 5000 Unit(s) SubCutaneous every 8 hours    ceFAZolin   IVPB   IV Intermittent   ceFAZolin   IVPB 1000 milliGRAM(s) IV Intermittent every 8 hours    norepinephrine Infusion 0.01 MICROgram(s)/kG/Min IV Continuous <Continuous>    levothyroxine 75 MICROGram(s) Oral daily      carbidopa/levodopa  25/100 2 Tablet(s) Oral every 4 hours  fentaNYL   Infusion 3.556 MICROgram(s)/kG/Hr IV Continuous <Continuous>  propofol Infusion 5 MICROgram(s)/kG/Min IV Continuous <Continuous>    pantoprazole   Suspension 40 milliGRAM(s) Oral before breakfast    sodium chloride 0.9% Bolus 1000 milliLiter(s) IV Bolus once        LABS:                        11.7   33.9  )-----------( 192      ( 2017 01:35 )             35.1     Hgb Trend: 11.7<--, 14.4<--  0714    141  |  105  |  26<H>  ----------------------------<  130<H>  3.9   |  22  |  0.58    Ca    8.0<L>      2017 01:35  Phos  3.1       Mg     1.8         TPro  5.4<L>  /  Alb  2.9<L>  /  TBili  0.3  /  DBili  x   /  AST  30  /  ALT  5<L>  /  AlkPhos  49      Creatinine Trend: 0.58<--, 0.92<--, 0.55<--  PT/INR - ( 2017 01:35 )   PT: 12.0 sec;   INR: 1.10 ratio         PTT - ( 2017 01:35 )  PTT:33.8 sec  Urinalysis Basic - ( 2017 02:55 )    Color: Yellow / Appearance: Clear / S.021 / pH: x  Gluc: x / Ketone: Trace  / Bili: Negative / Urobili: Negative   Blood: x / Protein: Trace / Nitrite: Negative   Leuk Esterase: Trace / RBC: 2-5 /HPF / WBC 2-5 /HPF   Sq Epi: x / Non Sq Epi: x / Bacteria: x      Arterial Blood Gas:   @ 01:34  7.50/32/112/24/99/2.2  ABG lactate: --  Arterial Blood Gas:   @ 13:27  7.38/46/284/26/99/1.4  ABG lactate: --    Venous Blood Gas:   @ 11:05  7.12/105/39/33/54  VBG Lactate: 3.6      MICROBIOLOGY:       RADIOLOGY:  [X] Reviewed and interpreted by me  < from: Xray Chest 1 View AP- PORTABLE-Urgent (17 @ 11:21) >  Impression: Endotracheal tube in good position. Mild left basilar   atelectasis. Otherwise, clear lungs.    < end of copied text >      EKG:  < from: 12 Lead ECG (17 @ 11:13) >  Diagnosis Line SINUS RHYTHM WITH PREMATURE SUPRAVENTRICULAR COMPLEXES  RIGHT ATRIAL ENLARGEMENT  LEFT AXIS DEVIATION  PULMONARY DISEASE PATTERN  NONSPECIFIC ST AND T WAVE ABNORMALITY    < end of copied text > CHIEF COMPLAINT: Epistaxis    Interval Events: Overnight patients blood pressure dropped to systolic of 30s, she was given 1 liter of fluids and propofol was D/C. Earlier this morning her blood pressure  went down to 60s, she was given another liter of fluids. Troponin were elevated sent after first drop in BP. EKG showed Q wave in V2 and left axis deviation.    REVIEW OF SYSTEMS:  Constitutional: [ ] negative [ ] fevers [ ] chills [ ] weight loss [ ] weight gain  HEENT: [ ] negative [ ] dry eyes [ ] eye irritation [ ] postnasal drip [ ] nasal congestion  CV: [ ] negative  [ ] chest pain [ ] orthopnea [ ] palpitations [ ] murmur  Resp: [ ] negative [ ] cough [ ] shortness of breath [ ] dyspnea [ ] wheezing [ ] sputum [ ] hemoptysis  GI: [ ] negative [ ] nausea [ ] vomiting [ ] diarrhea [ ] constipation [ ] abd pain [ ] dysphagia   : [ ] negative [ ] dysuria [ ] nocturia [ ] hematuria [ ] increased urinary frequency  Musculoskeletal: [ ] negative [ ] back pain [ ] myalgias [ ] arthralgias [ ] fracture  Skin: [ ] negative [ ] rash [ ] itch  Neurological: [ ] negative [ ] headache [ ] dizziness [ ] syncope [ ] weakness [ ] numbness  Psychiatric: [ ] negative [ ] anxiety [ ] depression  Endocrine: [ ] negative [ ] diabetes [ ] thyroid problem  Hematologic/Lymphatic: [ ] negative [ ] anemia [ ] bleeding problem  Allergic/Immunologic: [ ] negative [ ] itchy eyes [ ] nasal discharge [ ] hives [ ] angioedema  [ ] All other systems negative  [X] Unable to assess ROS because patient is sedated and intubated.     OBJECTIVE:  ICU Vital Signs Last 24 Hrs  T(C): 36.7 (2017 04:00), Max: 36.7 (2017 04:00)  T(F): 98 (2017 04:00), Max: 98 (2017 04:00)  HR: 55 (2017 06:45) (53 - 116)  BP: 120/74 (2017 06:45) (37/20 - 200/89)  BP(mean): 92 (2017 06:45) (25 - 128)  ABP: --  ABP(mean): --  RR: 20 (2017 06:45) (20 - 43)  SpO2: 100% (2017 06:45) (83% - 100%)    Mode: AC/ CMV (Assist Control/ Continuous Mandatory Ventilation), RR (machine): 20, TV (machine): 400, FiO2: 30, PEEP: 5, ITime: 1, MAP: 12, PIP: 29    07-13 @ 07:01  -  07-14 @ 07:00  --------------------------------------------------------  IN: 2959.8 mL / OUT: 0 mL / NET: 2959.8 mL      CAPILLARY BLOOD GLUCOSE    Physical Exam:   General:  NAD, patient has some agitated movements  HEENT: PERRLA, EOMI, moist mucous membranes  Neurology: Intubated/Sedated  Respiratory: CTA B/L, normal respiratory effort, no wheezes, crackles, rales  CV: RRR, S1S2, no murmurs, rubs or gallops  Abdominal: Soft, NT, ND +BS. PEG tube in place  Extremities: No edema, + peripheral pulses  Skin: Dry/Warm    LINES: norepinephrine Infusion 0.01 MICROgram(s)/kG/Min IV Continuous <Continuous>    HOSPITAL MEDICATIONS:  heparin  Injectable 5000 Unit(s) SubCutaneous every 8 hours    ceFAZolin   IVPB   IV Intermittent   ceFAZolin   IVPB 1000 milliGRAM(s) IV Intermittent every 8 hours    norepinephrine Infusion 0.01 MICROgram(s)/kG/Min IV Continuous <Continuous>    levothyroxine 75 MICROGram(s) Oral daily      carbidopa/levodopa  25/100 2 Tablet(s) Oral every 4 hours  fentaNYL   Infusion 3.556 MICROgram(s)/kG/Hr IV Continuous <Continuous>  propofol Infusion 5 MICROgram(s)/kG/Min IV Continuous <Continuous>    pantoprazole   Suspension 40 milliGRAM(s) Oral before breakfast    sodium chloride 0.9% Bolus 1000 milliLiter(s) IV Bolus once        LABS:                        11.7   33.9  )-----------( 192      ( 2017 01:35 )             35.1     Hgb Trend: 11.7<--, 14.4<--  14    141  |  105  |  26<H>  ----------------------------<  130<H>  3.9   |  22  |  0.58    Ca    8.0<L>      2017 01:35  Phos  3.1       Mg     1.8         TPro  5.4<L>  /  Alb  2.9<L>  /  TBili  0.3  /  DBili  x   /  AST  30  /  ALT  5<L>  /  AlkPhos  49      Creatinine Trend: 0.58<--, 0.92<--, 0.55<--  PT/INR - ( 2017 01:35 )   PT: 12.0 sec;   INR: 1.10 ratio         PTT - ( 2017 01:35 )  PTT:33.8 sec  Urinalysis Basic - ( 2017 02:55 )    Color: Yellow / Appearance: Clear / S.021 / pH: x  Gluc: x / Ketone: Trace  / Bili: Negative / Urobili: Negative   Blood: x / Protein: Trace / Nitrite: Negative   Leuk Esterase: Trace / RBC: 2-5 /HPF / WBC 2-5 /HPF   Sq Epi: x / Non Sq Epi: x / Bacteria: x      Arterial Blood Gas:   @ 01:34  7.50/32/112/24/99/2.2  ABG lactate: --  Arterial Blood Gas:   @ 13:27  7.38/46/284/26/99/1.4  ABG lactate: --    Venous Blood Gas:   @ 11:05  7.12/105/39/33/54  VBG Lactate: 3.6      MICROBIOLOGY:   UCx and BCx pending      RADIOLOGY:  [X] Reviewed and interpreted by me  < from: Xray Chest 1 View AP- PORTABLE-Urgent (17 @ 11:21) >  Impression: Endotracheal tube in good position. Mild left basilar   atelectasis. Otherwise, clear lungs.    < end of copied text >      EKG:  < from: 12 Lead ECG (17 @ 11:13) >  Diagnosis Line SINUS RHYTHM WITH PREMATURE SUPRAVENTRICULAR COMPLEXES  RIGHT ATRIAL ENLARGEMENT  LEFT AXIS DEVIATION  PULMONARY DISEASE PATTERN  NONSPECIFIC ST AND T WAVE ABNORMALITY    < end of copied text >    EEG Pending

## 2017-07-14 NOTE — CONSULT NOTE ADULT - PROBLEM SELECTOR RECOMMENDATION 9
VEEG   Give additional ativan   Neurology team will call back to medicine team after short EEG is read by epilepsy team for additional AEDs  CT head
- Continue with Nasal Packing (Rapid Rhino)  - Strict blood pressure control.  - Nasal saline, 2 sprays to both nares 4 times a day  - Avoid nasal trauma; no nose rubbing, blowing or manipulating nasal packing.  Sneeze with mouth open and pinching nares.  - Avoid bending with head blow the waist.    -No heavy lifting.  - ancef for the duration of packing

## 2017-07-14 NOTE — PROGRESS NOTE ADULT - ATTENDING COMMENTS
Agree with above. Patient with known TBI and Drug-induced Parkinsons presenting with epistaxis. She was in respiratory distress in the ED and was given a trial of BIPAP but intubated after being noted to be acidotic and hypercapnic on VBG and intubated - now complicated by seizures in MICU  -Hypoxic and Hypercapnic respiratory failure - intubation. Continue on vent settings  -Epistaxis - recurrent epistaxis with a history of nasal surgery - ENT eval noted  -TBI/Parkinsons - as per aide at bedside patient is verbal at baseline - will need to get better understanding of functional status so we can see if she is returning to baseline. Based on previous records it looks like family refused Palliative/DNR. Patient's primary decision maker is her mother who just recently had a stroke so we will need to clarify who will be making decisions for her. - Neuro eval for seizures. Ativan given with resolution of eye-twitching and deviation and LUE deviation  -Hypotension - on Levophed. Antibiotics broadened to Vanco/Cefe  -Critical Care Time 45 minutes Agree with above. Patient with known TBI and Drug-induced Parkinsons presenting with epistaxis. She was in respiratory distress in the ED and was given a trial of BIPAP but intubated after being noted to be acidotic and hypercapnic on VBG and intubated - now complicated by seizures in MICU  -Hypoxic and Hypercapnic respiratory failure - intubation. Continue on vent settings  -Epistaxis - recurrent epistaxis with a history of nasal surgery - ENT eval noted  -TBI/Parkinsons - as per aide at bedside patient is verbal at baseline - will need to get better understanding of functional status so we can see if she is returning to baseline. Based on previous records it looks like family refused Palliative/DNR. Patient's primary decision maker is her mother who just recently had a stroke so we will need to clarify who will be making decisions for her. - Neuro eval for seizures. Ativan given with resolution of eye-twitching and deviation and LUE deviation  -Hypotension - on Levophed. Antibiotics broadened to Vanco/Cefepime  -Elevated CE with no EKG changes - will trend enzymes.   -Critical Care Time 45 minutes

## 2017-07-14 NOTE — DIETITIAN INITIAL EVALUATION ADULT. - ADHERENCE
PEG feeds of Liquid Hope 4x/day, gets 2 12 oz containers at 8a and 12p,  6oz at 4p and 8p for a total of 1350 kcals, 69 gm protein (for 28 kcals/kg, 1.4 gm protein/kg usual wt of 47.7 kg) Per family it is ok to use Jevity while pt is in hospital.

## 2017-07-14 NOTE — DIETITIAN INITIAL EVALUATION ADULT. - NS AS NUTRI INTERV ENTERAL NUTRITION
Recommend PEG feeds Jevity 1.2 at 60 cc/hr x 18 hrs provides 1296 kcals, 60 gm protein, 872 cc free water  for 27 kcals/kg, 1.2 gm protein/kg dosing wt of 48.5kg

## 2017-07-14 NOTE — DIETITIAN INITIAL EVALUATION ADULT. - PHYSICAL APPEARANCE
underweight/BMI 16.3, per private aide pt's wt has been stable at 105 lb. Previous RD note 2/216 noted wt of 105 lb

## 2017-07-15 LAB
ALBUMIN SERPL ELPH-MCNC: 3 G/DL — LOW (ref 3.3–5)
ALP SERPL-CCNC: 55 U/L — SIGNIFICANT CHANGE UP (ref 40–120)
ALT FLD-CCNC: 5 U/L RC — LOW (ref 10–45)
ANION GAP SERPL CALC-SCNC: 11 MMOL/L — SIGNIFICANT CHANGE UP (ref 5–17)
AST SERPL-CCNC: 25 U/L — SIGNIFICANT CHANGE UP (ref 10–40)
BASE EXCESS BLDA CALC-SCNC: 1 MMOL/L — SIGNIFICANT CHANGE UP (ref -2–2)
BASOPHILS # BLD AUTO: 0.1 K/UL — SIGNIFICANT CHANGE UP (ref 0–0.2)
BASOPHILS NFR BLD AUTO: 0.4 % — SIGNIFICANT CHANGE UP (ref 0–2)
BILIRUB SERPL-MCNC: 0.3 MG/DL — SIGNIFICANT CHANGE UP (ref 0.2–1.2)
BUN SERPL-MCNC: 21 MG/DL — SIGNIFICANT CHANGE UP (ref 7–23)
CALCIUM SERPL-MCNC: 8 MG/DL — LOW (ref 8.4–10.5)
CHLORIDE SERPL-SCNC: 104 MMOL/L — SIGNIFICANT CHANGE UP (ref 96–108)
CO2 BLDA-SCNC: 26 MMOL/L — SIGNIFICANT CHANGE UP (ref 22–30)
CO2 SERPL-SCNC: 22 MMOL/L — SIGNIFICANT CHANGE UP (ref 22–31)
CREAT SERPL-MCNC: 0.51 MG/DL — SIGNIFICANT CHANGE UP (ref 0.5–1.3)
CULTURE RESULTS: NO GROWTH — SIGNIFICANT CHANGE UP
EOSINOPHIL # BLD AUTO: 0.1 K/UL — SIGNIFICANT CHANGE UP (ref 0–0.5)
EOSINOPHIL NFR BLD AUTO: 0.6 % — SIGNIFICANT CHANGE UP (ref 0–6)
GLUCOSE SERPL-MCNC: 140 MG/DL — HIGH (ref 70–99)
HCO3 BLDA-SCNC: 25 MMOL/L — SIGNIFICANT CHANGE UP (ref 21–29)
HCT VFR BLD CALC: 31.8 % — LOW (ref 34.5–45)
HGB BLD-MCNC: 10.6 G/DL — LOW (ref 11.5–15.5)
HOROWITZ INDEX BLDA+IHG-RTO: 30 — SIGNIFICANT CHANGE UP
INTUBATED: SIGNIFICANT CHANGE UP
LYMPHOCYTES # BLD AUTO: 1.5 K/UL — SIGNIFICANT CHANGE UP (ref 1–3.3)
LYMPHOCYTES # BLD AUTO: 9.6 % — LOW (ref 13–44)
MAGNESIUM SERPL-MCNC: 1.8 MG/DL — SIGNIFICANT CHANGE UP (ref 1.6–2.6)
MCHC RBC-ENTMCNC: 33.3 GM/DL — SIGNIFICANT CHANGE UP (ref 32–36)
MCHC RBC-ENTMCNC: 33.4 PG — SIGNIFICANT CHANGE UP (ref 27–34)
MCV RBC AUTO: 100 FL — SIGNIFICANT CHANGE UP (ref 80–100)
MONOCYTES # BLD AUTO: 0.9 K/UL — SIGNIFICANT CHANGE UP (ref 0–0.9)
MONOCYTES NFR BLD AUTO: 5.6 % — SIGNIFICANT CHANGE UP (ref 2–14)
NEUTROPHILS # BLD AUTO: 12.9 K/UL — HIGH (ref 1.8–7.4)
NEUTROPHILS NFR BLD AUTO: 83.8 % — HIGH (ref 43–77)
PCO2 BLDA: 39 MMHG — SIGNIFICANT CHANGE UP (ref 32–46)
PH BLDA: 7.42 — SIGNIFICANT CHANGE UP (ref 7.35–7.45)
PHOSPHATE SERPL-MCNC: 2.3 MG/DL — LOW (ref 2.5–4.5)
PLATELET # BLD AUTO: 165 K/UL — SIGNIFICANT CHANGE UP (ref 150–400)
PO2 BLDA: 81 MMHG — SIGNIFICANT CHANGE UP (ref 74–108)
POTASSIUM SERPL-MCNC: 3.8 MMOL/L — SIGNIFICANT CHANGE UP (ref 3.5–5.3)
POTASSIUM SERPL-SCNC: 3.8 MMOL/L — SIGNIFICANT CHANGE UP (ref 3.5–5.3)
PROT SERPL-MCNC: 5.5 G/DL — LOW (ref 6–8.3)
RBC # BLD: 3.18 M/UL — LOW (ref 3.8–5.2)
RBC # FLD: 13.4 % — SIGNIFICANT CHANGE UP (ref 10.3–14.5)
SAO2 % BLDA: 96 % — SIGNIFICANT CHANGE UP (ref 92–96)
SODIUM SERPL-SCNC: 137 MMOL/L — SIGNIFICANT CHANGE UP (ref 135–145)
SPECIMEN SOURCE: SIGNIFICANT CHANGE UP
VANCOMYCIN TROUGH SERPL-MCNC: 7.8 UG/ML — LOW (ref 10–20)
WBC # BLD: 15.4 K/UL — HIGH (ref 3.8–10.5)
WBC # FLD AUTO: 15.4 K/UL — HIGH (ref 3.8–10.5)

## 2017-07-15 PROCEDURE — 99291 CRITICAL CARE FIRST HOUR: CPT

## 2017-07-15 PROCEDURE — 95951: CPT | Mod: 26

## 2017-07-15 PROCEDURE — 95957 EEG DIGITAL ANALYSIS: CPT | Mod: 26

## 2017-07-15 RX ORDER — POTASSIUM PHOSPHATE, MONOBASIC POTASSIUM PHOSPHATE, DIBASIC 236; 224 MG/ML; MG/ML
15 INJECTION, SOLUTION INTRAVENOUS ONCE
Qty: 0 | Refills: 0 | Status: COMPLETED | OUTPATIENT
Start: 2017-07-15 | End: 2017-07-15

## 2017-07-15 RX ORDER — VANCOMYCIN HCL 1 G
1000 VIAL (EA) INTRAVENOUS EVERY 12 HOURS
Qty: 0 | Refills: 0 | Status: DISCONTINUED | OUTPATIENT
Start: 2017-07-15 | End: 2017-07-16

## 2017-07-15 RX ADMIN — CARBIDOPA AND LEVODOPA 2 TABLET(S): 25; 100 TABLET ORAL at 16:36

## 2017-07-15 RX ADMIN — Medication 250 MILLIGRAM(S): at 23:53

## 2017-07-15 RX ADMIN — HEPARIN SODIUM 5000 UNIT(S): 5000 INJECTION INTRAVENOUS; SUBCUTANEOUS at 05:13

## 2017-07-15 RX ADMIN — MIDAZOLAM HYDROCHLORIDE 6 MG/HR: 1 INJECTION, SOLUTION INTRAMUSCULAR; INTRAVENOUS at 05:10

## 2017-07-15 RX ADMIN — CARBIDOPA AND LEVODOPA 2 TABLET(S): 25; 100 TABLET ORAL at 08:43

## 2017-07-15 RX ADMIN — CEFEPIME 100 MILLIGRAM(S): 1 INJECTION, POWDER, FOR SOLUTION INTRAMUSCULAR; INTRAVENOUS at 14:00

## 2017-07-15 RX ADMIN — Medication 0.91 MICROGRAM(S)/KG/MIN: at 05:26

## 2017-07-15 RX ADMIN — MIDAZOLAM HYDROCHLORIDE 6 MG/HR: 1 INJECTION, SOLUTION INTRAMUSCULAR; INTRAVENOUS at 19:16

## 2017-07-15 RX ADMIN — FOSPHENYTOIN 104 MILLIGRAM(S) PE: 50 INJECTION INTRAMUSCULAR; INTRAVENOUS at 14:00

## 2017-07-15 RX ADMIN — HEPARIN SODIUM 5000 UNIT(S): 5000 INJECTION INTRAVENOUS; SUBCUTANEOUS at 14:00

## 2017-07-15 RX ADMIN — POTASSIUM PHOSPHATE, MONOBASIC POTASSIUM PHOSPHATE, DIBASIC 62.5 MILLIMOLE(S): 236; 224 INJECTION, SOLUTION INTRAVENOUS at 03:39

## 2017-07-15 RX ADMIN — PANTOPRAZOLE SODIUM 40 MILLIGRAM(S): 20 TABLET, DELAYED RELEASE ORAL at 08:43

## 2017-07-15 RX ADMIN — Medication 150 MILLIGRAM(S): at 12:01

## 2017-07-15 RX ADMIN — CARBIDOPA AND LEVODOPA 2 TABLET(S): 25; 100 TABLET ORAL at 19:15

## 2017-07-15 RX ADMIN — CEFEPIME 100 MILLIGRAM(S): 1 INJECTION, POWDER, FOR SOLUTION INTRAMUSCULAR; INTRAVENOUS at 05:25

## 2017-07-15 RX ADMIN — CARBIDOPA AND LEVODOPA 1 TABLET(S): 25; 100 TABLET ORAL at 12:15

## 2017-07-15 RX ADMIN — FOSPHENYTOIN 104 MILLIGRAM(S) PE: 50 INJECTION INTRAMUSCULAR; INTRAVENOUS at 21:11

## 2017-07-15 RX ADMIN — CEFEPIME 100 MILLIGRAM(S): 1 INJECTION, POWDER, FOR SOLUTION INTRAMUSCULAR; INTRAVENOUS at 21:11

## 2017-07-15 RX ADMIN — FOSPHENYTOIN 104 MILLIGRAM(S) PE: 50 INJECTION INTRAMUSCULAR; INTRAVENOUS at 05:15

## 2017-07-15 RX ADMIN — CARBIDOPA AND LEVODOPA 2 TABLET(S): 25; 100 TABLET ORAL at 03:13

## 2017-07-15 RX ADMIN — HEPARIN SODIUM 5000 UNIT(S): 5000 INJECTION INTRAVENOUS; SUBCUTANEOUS at 21:11

## 2017-07-15 RX ADMIN — Medication 75 MICROGRAM(S): at 05:21

## 2017-07-15 RX ADMIN — CARBIDOPA AND LEVODOPA 2 TABLET(S): 25; 100 TABLET ORAL at 23:53

## 2017-07-15 NOTE — EEG REPORT - NS EEG TEXT BOX
Date: 7-14 to 7-15-16    Indication: Concern for seizure     FINDINGS:  The background was continuous and variable. The PDR was not present. The background consisted of continuous generalized irregular theta and delta frequency activity. There was accentuation of fast frequency activity with high amplitude at times sharply contoured over the right frontal-paracentral region (breach artifact).  Generalized fast activity was present.     Sleep Background:  Sleep architecture was not observed in this study     Interictal Epileptiform Activity:   Occasional sharp wave activity over the right frontal-paracentral region maximal at C4>F4     Events:  -No clinical events or seizures were recorded during this study.      Activation Procedures:   -Hyperventilation was not performed.    -Photic stimulation was not performed.     Artifacts:  Intermittent myogenic and movement artifacts were noted.    ECG:  The heart rate on single channel ECG was predominantly between 60-70 BPM.    Compressed Spectral Array Digital Analysis    FINDINGS:  Compressed Spectral Array (CSA) data was reviewed separately and correlated with the electroencephalographic findings detailed above.  CSA showed a variable spectral pattern.  Areas of increased power in particular were reviewed in detail, and compared with the raw EEG data.  Areas of abrupt increases in spectral power were reviewed to exclude seizures, and were determined to be artifactual in nature except during seizures above    The relative ratio of the power of delta range frequencies and faster frequencies remained stable over the course of the study.  There was no definitive increase in the relative power in the delta frequency spectrum apparent in the left hemisphere versus the right hemisphere.      Compressed Spectral Array (Digital Analysis) Summary/ Impression:  No persistent hemispheric asymmetry.  Intermittent areas of increased power reviewed, without definite epileptiform activity associated on CSA except during seizures above    EEG Classification / Summary:  Abnormal Routine EEG Study:  Occasional right frontal-central sharp waves   Moderate generalized slowing   Breach artifact over the right frontal-paracentral region   Generalized fast activity       Clinical Impression:  Findings indicate moderate diffuse or multifocal cerebral dysfunction with increased risk of focal onset seizure from the right frontal-central region.  Diffuse fast activity likely from medication effect.  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 Date: 7-14 to 7-15-16    Indication: Concern for seizure     FINDINGS:  The background was continuous and variable. The PDR was not present. The background consisted of continuous generalized irregular theta and delta frequency activity. There was accentuation of fast frequency activity with high amplitude at times sharply contoured over the right frontal-paracentral region (breach artifact).  Generalized fast activity was present.     Sleep Background:  Sleep architecture was not observed in this study     Interictal Epileptiform Activity:   Occasional sharp wave activity over the right frontal-paracentral region maximal at C4>F4     Events:  -No clinical events or seizures were recorded during this study.      Activation Procedures:   -Hyperventilation was not performed.    -Photic stimulation was not performed.     Artifacts:  Intermittent myogenic and movement artifacts were noted.    ECG:  The heart rate on single channel ECG was predominantly between 60-70 BPM.    Compressed Spectral Array Digital Analysis    FINDINGS:  Compressed Spectral Array (CSA) data was reviewed separately and correlated with the electroencephalographic findings detailed above.  CSA showed a variable spectral pattern.  Areas of increased power in particular were reviewed in detail, and compared with the raw EEG data.  Areas of abrupt increases in spectral power were reviewed to exclude seizures, and were determined to be artifactual in nature except during seizures above    The relative ratio of the power of delta range frequencies and faster frequencies remained stable over the course of the study.  There was no definitive increase in the relative power in the delta frequency spectrum apparent in the left hemisphere versus the right hemisphere.      Compressed Spectral Array (Digital Analysis) Summary/ Impression:  No persistent hemispheric asymmetry.  Intermittent areas of increased power reviewed, without definite epileptiform activity associated on CSA except during seizures above    EEG Classification / Summary:  Abnormal Routine EEG Study:  Occasional right frontal-central sharp waves   Moderate generalized slowing   Breach artifact over the right frontal-paracentral region   Generalized fast activity       Clinical Impression:  Findings indicate moderate diffuse or multifocal cerebral dysfunction with increased risk of focal onset seizure from the right frontal-central region.  Diffuse fast activity likely from medication effect.  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  No seizures.

## 2017-07-15 NOTE — PROGRESS NOTE ADULT - SUBJECTIVE AND OBJECTIVE BOX
POD/STATUS POST/ENT ISSUE: Right anterior epistaxis now resolved s/p packing    S: Patient intubated and sedated. No acute events as per RN overnight.     Vital Signs Last 24 Hrs  T(C): 36.8 (15 Jul 2017 07:15), Max: 37.4 (14 Jul 2017 12:00)  T(F): 98.2 (15 Jul 2017 07:15), Max: 99.4 (14 Jul 2017 12:00)  HR: 58 (15 Jul 2017 08:45) (50 - 97)  BP: 124/58 (15 Jul 2017 08:45) (64/40 - 142/99)  BP(mean): 83 (15 Jul 2017 08:45) (48 - 115)  RR: 16 (15 Jul 2017 08:45) (13 - 37)  SpO2: 100% (15 Jul 2017 08:45) (73% - 100%)    PHYSICAL EXAM:  Gen: NAD, well-developed  Head: Normocephalic, Atraumatic, EEG leads in place  Eyes: PERRL, EOMI, no scleral injection  Ears: Right - ear canal clear, TM intact without effusion            Left - ear canal clear, TM intact without effusion  Nose: Rapid rhino inflated and in place in right nare with no discharge and no bleeding  Mouth: Mucosa moist, tongue/uvula midline, oropharynx clear, no bleeding, intubated with bite guard in place  Neck: Flat, supple, no lymphadenopathy, trachea midline, no masses    LABS:                        10.6   15.4  )-----------( 165      ( 15 Jul 2017 02:40 )             31.8             Mariaelena Pham PA-C  p#[phone]

## 2017-07-15 NOTE — PROGRESS NOTE ADULT - SUBJECTIVE AND OBJECTIVE BOX
CHIEF COMPLAINT: Epistaxis    Interval Events: Overnight patients blood pressure dropped to systolic of 30s, she was given 1 liter of fluids and propofol was D/C. Earlier this morning her blood pressure  went down to 60s, she was given another liter of fluids. Troponin were elevated sent after first drop in BP. EKG showed Q wave in V2 and left axis deviation.    REVIEW OF SYSTEMS:  Constitutional: [ ] negative [ ] fevers [ ] chills [ ] weight loss [ ] weight gain  HEENT: [ ] negative [ ] dry eyes [ ] eye irritation [ ] postnasal drip [ ] nasal congestion  CV: [ ] negative  [ ] chest pain [ ] orthopnea [ ] palpitations [ ] murmur  Resp: [ ] negative [ ] cough [ ] shortness of breath [ ] dyspnea [ ] wheezing [ ] sputum [ ] hemoptysis  GI: [ ] negative [ ] nausea [ ] vomiting [ ] diarrhea [ ] constipation [ ] abd pain [ ] dysphagia   : [ ] negative [ ] dysuria [ ] nocturia [ ] hematuria [ ] increased urinary frequency  Musculoskeletal: [ ] negative [ ] back pain [ ] myalgias [ ] arthralgias [ ] fracture  Skin: [ ] negative [ ] rash [ ] itch  Neurological: [ ] negative [ ] headache [ ] dizziness [ ] syncope [ ] weakness [ ] numbness  Psychiatric: [ ] negative [ ] anxiety [ ] depression  Endocrine: [ ] negative [ ] diabetes [ ] thyroid problem  Hematologic/Lymphatic: [ ] negative [ ] anemia [ ] bleeding problem  Allergic/Immunologic: [ ] negative [ ] itchy eyes [ ] nasal discharge [ ] hives [ ] angioedema  [ ] All other systems negative  [X] Unable to assess ROS because patient is sedated and intubated.     OBJECTIVE:  ICU Vital Signs Last 24 Hrs  T(C): 36.7 (2017 04:00), Max: 36.7 (2017 04:00)  T(F): 98 (2017 04:00), Max: 98 (2017 04:00)  HR: 55 (2017 06:45) (53 - 116)  BP: 120/74 (2017 06:45) (37/20 - 200/89)  BP(mean): 92 (2017 06:45) (25 - 128)  ABP: --  ABP(mean): --  RR: 20 (2017 06:45) (20 - 43)  SpO2: 100% (2017 06:45) (83% - 100%)    Mode: AC/ CMV (Assist Control/ Continuous Mandatory Ventilation), RR (machine): 20, TV (machine): 400, FiO2: 30, PEEP: 5, ITime: 1, MAP: 12, PIP: 29    07-13 @ 07:01  -  07-14 @ 07:00  --------------------------------------------------------  IN: 2959.8 mL / OUT: 0 mL / NET: 2959.8 mL      CAPILLARY BLOOD GLUCOSE    Physical Exam:   General:  NAD, patient has some agitated movements  HEENT: PERRLA, EOMI, moist mucous membranes  Neurology: Intubated/Sedated  Respiratory: CTA B/L, normal respiratory effort, no wheezes, crackles, rales  CV: RRR, S1S2, no murmurs, rubs or gallops  Abdominal: Soft, NT, ND +BS. PEG tube in place  Extremities: No edema, + peripheral pulses  Skin: Dry/Warm    LINES: norepinephrine Infusion 0.01 MICROgram(s)/kG/Min IV Continuous <Continuous>    HOSPITAL MEDICATIONS:  heparin  Injectable 5000 Unit(s) SubCutaneous every 8 hours    ceFAZolin   IVPB   IV Intermittent   ceFAZolin   IVPB 1000 milliGRAM(s) IV Intermittent every 8 hours    norepinephrine Infusion 0.01 MICROgram(s)/kG/Min IV Continuous <Continuous>    levothyroxine 75 MICROGram(s) Oral daily      carbidopa/levodopa  25/100 2 Tablet(s) Oral every 4 hours  fentaNYL   Infusion 3.556 MICROgram(s)/kG/Hr IV Continuous <Continuous>  propofol Infusion 5 MICROgram(s)/kG/Min IV Continuous <Continuous>    pantoprazole   Suspension 40 milliGRAM(s) Oral before breakfast    sodium chloride 0.9% Bolus 1000 milliLiter(s) IV Bolus once        LABS:                        11.7   33.9  )-----------( 192      ( 2017 01:35 )             35.1     Hgb Trend: 11.7<--, 14.4<--  14    141  |  105  |  26<H>  ----------------------------<  130<H>  3.9   |  22  |  0.58    Ca    8.0<L>      2017 01:35  Phos  3.1       Mg     1.8         TPro  5.4<L>  /  Alb  2.9<L>  /  TBili  0.3  /  DBili  x   /  AST  30  /  ALT  5<L>  /  AlkPhos  49      Creatinine Trend: 0.58<--, 0.92<--, 0.55<--  PT/INR - ( 2017 01:35 )   PT: 12.0 sec;   INR: 1.10 ratio         PTT - ( 2017 01:35 )  PTT:33.8 sec  Urinalysis Basic - ( 2017 02:55 )    Color: Yellow / Appearance: Clear / S.021 / pH: x  Gluc: x / Ketone: Trace  / Bili: Negative / Urobili: Negative   Blood: x / Protein: Trace / Nitrite: Negative   Leuk Esterase: Trace / RBC: 2-5 /HPF / WBC 2-5 /HPF   Sq Epi: x / Non Sq Epi: x / Bacteria: x      Arterial Blood Gas:   @ 01:34  7.50/32/112/24/99/2.2  ABG lactate: --  Arterial Blood Gas:   @ 13:27  7.38/46/284/26/99/1.4  ABG lactate: --    Venous Blood Gas:   @ 11:05  7.12/105/39/33/54  VBG Lactate: 3.6      MICROBIOLOGY:   UCx and BCx pending      RADIOLOGY:  [X] Reviewed and interpreted by me  < from: Xray Chest 1 View AP- PORTABLE-Urgent (17 @ 11:21) >  Impression: Endotracheal tube in good position. Mild left basilar   atelectasis. Otherwise, clear lungs.    < end of copied text >      EKG:  < from: 12 Lead ECG (17 @ 11:13) >  Diagnosis Line SINUS RHYTHM WITH PREMATURE SUPRAVENTRICULAR COMPLEXES  RIGHT ATRIAL ENLARGEMENT  LEFT AXIS DEVIATION  PULMONARY DISEASE PATTERN  NONSPECIFIC ST AND T WAVE ABNORMALITY    < end of copied text >    EEG Pending CHIEF COMPLAINT: Epistaxis    Interval Events: Patient showed no epileptic activity overnight, no acute concerns , ENT plan for remove nasal packing tomorrow    REVIEW OF SYSTEMS:  Constitutional: [ ] negative [ ] fevers [ ] chills [ ] weight loss [ ] weight gain  HEENT: [ ] negative [ ] dry eyes [ ] eye irritation [ ] postnasal drip [ ] nasal congestion  CV: [ ] negative  [ ] chest pain [ ] orthopnea [ ] palpitations [ ] murmur  Resp: [ ] negative [ ] cough [ ] shortness of breath [ ] dyspnea [ ] wheezing [ ] sputum [ ] hemoptysis  GI: [ ] negative [ ] nausea [ ] vomiting [ ] diarrhea [ ] constipation [ ] abd pain [ ] dysphagia   : [ ] negative [ ] dysuria [ ] nocturia [ ] hematuria [ ] increased urinary frequency  Musculoskeletal: [ ] negative [ ] back pain [ ] myalgias [ ] arthralgias [ ] fracture  Skin: [ ] negative [ ] rash [ ] itch  Neurological: [ ] negative [ ] headache [ ] dizziness [ ] syncope [ ] weakness [ ] numbness  Psychiatric: [ ] negative [ ] anxiety [ ] depression  Endocrine: [ ] negative [ ] diabetes [ ] thyroid problem  Hematologic/Lymphatic: [ ] negative [ ] anemia [ ] bleeding problem  Allergic/Immunologic: [ ] negative [ ] itchy eyes [ ] nasal discharge [ ] hives [ ] angioedema  [ ] All other systems negative  [X] Unable to assess ROS because Pt. was sedated and intubated.    OBJECTIVE:  ICU Vital Signs Last 24 Hrs  T(C): 36.9 (15 Jul 2017 11:00), Max: 37.3 (15 Jul 2017 00:00)  T(F): 98.4 (15 Jul 2017 11:00), Max: 99.2 (15 Jul 2017 00:00)  HR: 52 (15 Jul 2017 14:30) (50 - 73)  BP: 105/57 (15 Jul 2017 14:30) (64/40 - 138/57)  BP(mean): 77 (15 Jul 2017 14:30) (48 - 93)  ABP: --  ABP(mean): --  RR: 16 (15 Jul 2017 14:30) (13 - 37)  SpO2: 99% (15 Jul 2017 14:30) (84% - 100%)    Mode: AC/ CMV (Assist Control/ Continuous Mandatory Ventilation), RR (machine): 16, TV (machine): 400, FiO2: 30, PEEP: 5, ITime: 1, MAP: 12, PIP: 35     @ 07:  -  07-15 @ 07:00  --------------------------------------------------------  IN: 2891.6 mL / OUT: 20 mL / NET: 2871.6 mL    07-15 @ 07:01  -  07-15 @ 14:36  --------------------------------------------------------  IN: 858 mL / OUT: 30 mL / NET: 828 mL      CAPILLARY BLOOD GLUCOSE      Physical Exam:   General:  NAD, patient has some agitated movements  HEENT: PERRLA, EOMI, moist mucous membranes  Neurology: Intubated/Sedated  Respiratory: CTA B/L, normal respiratory effort, no wheezes, crackles, rales  CV: RRR, S1S2, no murmurs, rubs or gallops  Abdominal: Soft, NT, ND +BS. PEG tube in place  Extremities: No edema, + peripheral pulses  Skin: Dry/Warm    LINES:    HOSPITAL MEDICATIONS:  heparin  Injectable 5000 Unit(s) SubCutaneous every 8 hours    vancomycin  IVPB 750 milliGRAM(s) IV Intermittent every 12 hours  cefepime  IVPB 1000 milliGRAM(s) IV Intermittent every 8 hours  cefepime  IVPB   IV Intermittent     norepinephrine Infusion 0.01 MICROgram(s)/kG/Min IV Continuous <Continuous>    levothyroxine 75 MICROGram(s) Oral daily      carbidopa/levodopa  25/100 2 Tablet(s) Oral every 4 hours  midazolam Infusion 6 mG/Hr IV Continuous <Continuous>  fosphenytoin IVPB 100 milliGRAM(s) PE IV Intermittent every 8 hours    pantoprazole   Suspension 40 milliGRAM(s) Oral before breakfast      LABS:                        10.6   15.4  )-----------( 165      ( 15 Jul 2017 02:40 )             31.8     Hgb Trend: 10.6<--, 10.4<--, 11.7<--, 14.4<--  07-15    137  |  104  |  21  ----------------------------<  140<H>  3.8   |  22  |  0.51    Ca    8.0<L>      15 Jul 2017 02:40  Phos  2.3     07-15  Mg     1.8     07-15    TPro  5.5<L>  /  Alb  3.0<L>  /  TBili  0.3  /  DBili  x   /  AST  25  /  ALT  5<L>  /  AlkPhos  55  07-15    Creatinine Trend: 0.51<--, 0.58<--, 0.92<--, 0.55<--  PT/INR - ( 2017 01:35 )   PT: 12.0 sec;   INR: 1.10 ratio         PTT - ( 2017 01:35 )  PTT:33.8 sec  Urinalysis Basic - ( 2017 02:55 )    Color: Yellow / Appearance: Clear / S.021 / pH: x  Gluc: x / Ketone: Trace  / Bili: Negative / Urobili: Negative   Blood: x / Protein: Trace / Nitrite: Negative   Leuk Esterase: Trace / RBC: 2-5 /HPF / WBC 2-5 /HPF   Sq Epi: x / Non Sq Epi: x / Bacteria: x      Arterial Blood Gas:  07-15 @ 09:15  7.42/39/81/25/96/1.0  ABG lactate: --  Arterial Blood Gas:   15:59  7.50/32/95/24/97/2.1  ABG lactate: --  Arterial Blood Gas:   @ 01:34  7.50/32/112/24/99/2.2  ABG lactate: --      MICROBIOLOGY:  Culture - Urine (17 @ 12:35)    Specimen Source: .Urine Clean Catch (Midstream)    Culture Results:   No growth    Culture - Blood (17 @ 05:06)    Specimen Source: .Blood Blood    Culture Results:   No growth to date.       RADIOLOGY:  [X] Reviewed and interpreted by me  < from: Xray Chest 1 View AP- PORTABLE-Urgent (17 @ 11:21) >  Impression: Endotracheal tube in good position. Mild left basilar   atelectasis. Otherwise, clear lungs.      EKG:  < from: 12 Lead ECG (17 @ 11:13) >  Diagnosis Line SINUS RHYTHM WITH PREMATURE SUPRAVENTRICULAR COMPLEXES  RIGHT ATRIAL ENLARGEMENT  LEFT AXIS DEVIATION  PULMONARY DISEASE PATTERN  NONSPECIFIC ST AND T WAVE ABNORMALITY    EEG:    FINDINGS:  Compressed Spectral Array (CSA) data was reviewed separately and correlated with the electroencephalographic findings detailed above.  CSA showed a variable spectral pattern.  Areas of increased power in particular were reviewed in detail, and compared with the raw EEG data.  Areas of abrupt increases in spectral power were reviewed to exclude seizures, and were determined to be artifactual in nature except during seizures above    The relative ratio of the power of delta range frequencies and faster frequencies remained stable over the course of the study.  There was no definitive increase in the relative power in the delta frequency spectrum apparent in the left hemisphere versus the right hemisphere.      Compressed Spectral Array (Digital Analysis) Summary/ Impression:  No persistent hemispheric asymmetry.  Intermittent areas of increased power reviewed, without definite epileptiform activity associated on CSA except during seizures above    EEG Classification / Summary:  Abnormal Routine EEG Study:  Occasional right frontal-central sharp waves   Moderate generalized slowing   Breach artifact over the right frontal-paracentral region   Generalized fast activity       Clinical Impression:  Findings indicate moderate diffuse or multifocal cerebral dysfunction with increased risk of focal onset seizure from the right frontal-central region.  Diffuse fast activity likely from medication effect.  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  No seizures.

## 2017-07-15 NOTE — PROGRESS NOTE ADULT - ATTENDING COMMENTS
Ms. Malloy had CT head last night which did not show any acute changes, and EEG now shows no seizures on Versed 5mg/hr.  She is requiring a small amoung of levophed but is awake and following simple commands this morning.  REmains on antibioitcs until nasal packing can be removed tomorrow.  Agree with current management.  Will not taper versed as of yet as per neurology.      She is critically ill.    ET 40 min.

## 2017-07-15 NOTE — PROGRESS NOTE ADULT - ASSESSMENT
63yo F with recurrent epistaxis controlled with rapid rhino (nasal packing placed 7/13) and intubated for hypoxia. No episodes of epistaxis overnight. Currently under EEG.

## 2017-07-15 NOTE — PROGRESS NOTE ADULT - ASSESSMENT
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  Plan: Patient with long history of PD from neuroleptic use; patient severe rigidity, on high-dose sinemet, with recurrent aspiration with prior hospitalization.  - continue sinemet  - non-oral nutrition with tube feeds  - goals of care discussion in the past with family (patient refused hospice placement in the past). Mother of the patient has been making medical decisions but has recently suffered a stroke.  -Pt. had facial twitching and upward eye deviation, Neurology was consulted for possible epileptic activity    #Respiratory  Acute respiratory failure with hypoxia and hypercapnia.  Plan: - Likely 2/2 Epistaxis, no PNA visualized on CXR.  US showed A-line predominant   - - wean FiO2, keep Sat >92%  - frequent suctioning  -  Check ABG and adjust Vent settings as needed  - ENT evaluation for epistaxis- on board  - Protonix while on vent.     #Cardiac  Continue to follow troponin trends, No acute concerns on EKG    #ID  WBC count 33.9 , Procalcitonin 0.86, D/C Ancef, started on Cefepime/ Vanco, WBC count came down to 24     #Heme  Epistaxis, recurrent.  Plan: - CBC stable. Monitor CBC daily  - Airway protected with ET Tube.    #Endo  Hypothyroidism.  Plan: Continue synthroid 75 mcg/daily  Check TSH level.     #Prophylactic  Plan: HSQ. 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  Plan: Patient with long history of PD from neuroleptic use; patient severe rigidity, on high-dose sinemet, with recurrent aspiration with prior hospitalization.  - continue sinemet  - non-oral nutrition with tube feeds  - goals of care discussion in the past with family (patient refused hospice placement in the past). Mother of the patient has been making medical decisions but has recently suffered a stroke.  -Pt. had facial twitching and upward eye deviation, Neurology was consulted for possible epileptic activity    #Respiratory  Acute respiratory failure with hypoxia and hypercapnia.  Plan: - Likely 2/2 Epistaxis, no PNA visualized on CXR.  US showed A-line predominant   - - wean FiO2, keep Sat >92%  - frequent suctioning  -  Check ABG and adjust Vent settings as needed  - ENT evaluation for epistaxis- on board  - Protonix while on vent.     #Cardiac  Continue to follow troponin trends, No acute concerns on EKG    #ID  WBC count 33.9 , Procalcitonin 0.86, D/C Ancef, started on Cefepime/ Vanco, WBC count came down to 24     #Heme  Epistaxis, recurrent.  Plan: - CBC stable. Monitor CBC daily  - Airway protected with ET Tube.    #Endo  Hypothyroidism.  Plan: Continue synthroid 75 mcg/daily  Check TSH level.     #Prophylactic  Plan: HSQ.     * f/u on Phos of 2.3  ABG today was good 62 -year-old F with cognitive impairment, TBI c/b ICH, Drug-induced Parkinson's Disease (neuroleptic use) s/p PEG placement for aspiration, Depression, Schizophrenia, Hypothyroidism, GERD, evaluated for epistaxis, had respiratory distress in the setting of epistaxis and failure to protect airway, admitted to MICU    #Neuro  Parkinsonism due to drug.  Plan: Patient with long history of PD from neuroleptic use; patient severe rigidity, on high-dose sinemet, with recurrent aspiration with prior hospitalization.  - continue sinemet  - non-oral nutrition with tube feeds  - goals of care discussion in the past with family (patient refused hospice placement in the past). Mother of the patient has been making medical decisions but has recently suffered a stroke.  -Pt. was shown to have nonconvulsive status epilepticus on spot EEG- Started on fosphenytoin and versed drip , 24 hour video EEG didn't show any further epileptic activity overnight    #Respiratory  Acute respiratory failure with hypoxia and hypercapnia.  Plan: - Likely 2/2 Epistaxis, no PNA visualized on CXR.  US showed A-line predominant   - - wean FiO2, keep Sat >92%  - frequent suctioning  -  Check ABG and adjust Vent settings as needed  Last ABG WNL  - ENT evaluation for epistaxis- on board will remove nasal packing Sunday  - Protonix while on vent.     #Cardiac  Troponin trending down, No acute concerns on EKG    #ID  WBC count trending down , Procalcitonin 0.86, D/C Ancef, started on Cefepime/ Vanco for prophylaxis for nasal packing    #Heme  Epistaxis, recurrent.  Plan: - CBC stable. Monitor CBC daily  - Airway protected with ET Tube.    #Endo  Hypothyroidism.  Plan: Continue synthroid 75 mcg/daily  Check TSH level.   Phosphate level 2.3 -continue to monitor    #Prophylactic  Plan: HSQ.

## 2017-07-16 LAB
ALBUMIN SERPL ELPH-MCNC: 2.9 G/DL — LOW (ref 3.3–5)
ALP SERPL-CCNC: 59 U/L — SIGNIFICANT CHANGE UP (ref 40–120)
ALT FLD-CCNC: 7 U/L RC — LOW (ref 10–45)
ANION GAP SERPL CALC-SCNC: 10 MMOL/L — SIGNIFICANT CHANGE UP (ref 5–17)
AST SERPL-CCNC: 19 U/L — SIGNIFICANT CHANGE UP (ref 10–40)
BASOPHILS # BLD AUTO: 0 K/UL — SIGNIFICANT CHANGE UP (ref 0–0.2)
BASOPHILS NFR BLD AUTO: 0.4 % — SIGNIFICANT CHANGE UP (ref 0–2)
BILIRUB SERPL-MCNC: 0.3 MG/DL — SIGNIFICANT CHANGE UP (ref 0.2–1.2)
BUN SERPL-MCNC: 15 MG/DL — SIGNIFICANT CHANGE UP (ref 7–23)
CALCIUM SERPL-MCNC: 8 MG/DL — LOW (ref 8.4–10.5)
CHLORIDE SERPL-SCNC: 104 MMOL/L — SIGNIFICANT CHANGE UP (ref 96–108)
CO2 SERPL-SCNC: 24 MMOL/L — SIGNIFICANT CHANGE UP (ref 22–31)
CREAT SERPL-MCNC: 0.45 MG/DL — LOW (ref 0.5–1.3)
EOSINOPHIL # BLD AUTO: 0.2 K/UL — SIGNIFICANT CHANGE UP (ref 0–0.5)
EOSINOPHIL NFR BLD AUTO: 1.7 % — SIGNIFICANT CHANGE UP (ref 0–6)
GLUCOSE SERPL-MCNC: 98 MG/DL — SIGNIFICANT CHANGE UP (ref 70–99)
HCT VFR BLD CALC: 31.4 % — LOW (ref 34.5–45)
HGB BLD-MCNC: 10.2 G/DL — LOW (ref 11.5–15.5)
LYMPHOCYTES # BLD AUTO: 1 K/UL — SIGNIFICANT CHANGE UP (ref 1–3.3)
LYMPHOCYTES # BLD AUTO: 9.8 % — LOW (ref 13–44)
MAGNESIUM SERPL-MCNC: 1.8 MG/DL — SIGNIFICANT CHANGE UP (ref 1.6–2.6)
MCHC RBC-ENTMCNC: 32.4 GM/DL — SIGNIFICANT CHANGE UP (ref 32–36)
MCHC RBC-ENTMCNC: 32.8 PG — SIGNIFICANT CHANGE UP (ref 27–34)
MCV RBC AUTO: 101 FL — HIGH (ref 80–100)
MONOCYTES # BLD AUTO: 0.7 K/UL — SIGNIFICANT CHANGE UP (ref 0–0.9)
MONOCYTES NFR BLD AUTO: 6.8 % — SIGNIFICANT CHANGE UP (ref 2–14)
NEUTROPHILS # BLD AUTO: 7.9 K/UL — HIGH (ref 1.8–7.4)
NEUTROPHILS NFR BLD AUTO: 81.3 % — HIGH (ref 43–77)
PHENYTOIN FREE SERPL-MCNC: 11.1 UG/ML — SIGNIFICANT CHANGE UP (ref 10–20)
PHOSPHATE SERPL-MCNC: 3.3 MG/DL — SIGNIFICANT CHANGE UP (ref 2.5–4.5)
PLATELET # BLD AUTO: 132 K/UL — LOW (ref 150–400)
POTASSIUM SERPL-MCNC: 3.9 MMOL/L — SIGNIFICANT CHANGE UP (ref 3.5–5.3)
POTASSIUM SERPL-SCNC: 3.9 MMOL/L — SIGNIFICANT CHANGE UP (ref 3.5–5.3)
PROT SERPL-MCNC: 5.8 G/DL — LOW (ref 6–8.3)
RBC # BLD: 3.1 M/UL — LOW (ref 3.8–5.2)
RBC # FLD: 13.5 % — SIGNIFICANT CHANGE UP (ref 10.3–14.5)
SODIUM SERPL-SCNC: 138 MMOL/L — SIGNIFICANT CHANGE UP (ref 135–145)
TSH SERPL-MCNC: 0.82 UIU/ML — SIGNIFICANT CHANGE UP (ref 0.27–4.2)
WBC # BLD: 9.7 K/UL — SIGNIFICANT CHANGE UP (ref 3.8–10.5)
WBC # FLD AUTO: 9.7 K/UL — SIGNIFICANT CHANGE UP (ref 3.8–10.5)

## 2017-07-16 PROCEDURE — 95951: CPT | Mod: 26

## 2017-07-16 PROCEDURE — 95957 EEG DIGITAL ANALYSIS: CPT | Mod: 26

## 2017-07-16 PROCEDURE — 99291 CRITICAL CARE FIRST HOUR: CPT

## 2017-07-16 RX ORDER — MIDAZOLAM HYDROCHLORIDE 1 MG/ML
2 INJECTION, SOLUTION INTRAMUSCULAR; INTRAVENOUS
Qty: 100 | Refills: 0 | Status: DISCONTINUED | OUTPATIENT
Start: 2017-07-16 | End: 2017-07-17

## 2017-07-16 RX ORDER — MIDAZOLAM HYDROCHLORIDE 1 MG/ML
4 INJECTION, SOLUTION INTRAMUSCULAR; INTRAVENOUS
Qty: 100 | Refills: 0 | Status: DISCONTINUED | OUTPATIENT
Start: 2017-07-16 | End: 2017-07-16

## 2017-07-16 RX ORDER — FENTANYL CITRATE 50 UG/ML
25 INJECTION INTRAVENOUS ONCE
Qty: 0 | Refills: 0 | Status: DISCONTINUED | OUTPATIENT
Start: 2017-07-16 | End: 2017-07-16

## 2017-07-16 RX ORDER — BACITRACIN ZINC 500 UNIT/G
1 OINTMENT IN PACKET (EA) TOPICAL
Qty: 0 | Refills: 0 | Status: DISCONTINUED | OUTPATIENT
Start: 2017-07-16 | End: 2017-07-26

## 2017-07-16 RX ORDER — CHLORHEXIDINE GLUCONATE 213 G/1000ML
15 SOLUTION TOPICAL
Qty: 0 | Refills: 0 | Status: DISCONTINUED | OUTPATIENT
Start: 2017-07-16 | End: 2017-07-20

## 2017-07-16 RX ORDER — SODIUM CHLORIDE 0.65 %
1 AEROSOL, SPRAY (ML) NASAL THREE TIMES A DAY
Qty: 0 | Refills: 0 | Status: DISCONTINUED | OUTPATIENT
Start: 2017-07-16 | End: 2017-07-26

## 2017-07-16 RX ADMIN — CARBIDOPA AND LEVODOPA 2 TABLET(S): 25; 100 TABLET ORAL at 08:34

## 2017-07-16 RX ADMIN — HEPARIN SODIUM 5000 UNIT(S): 5000 INJECTION INTRAVENOUS; SUBCUTANEOUS at 05:00

## 2017-07-16 RX ADMIN — FENTANYL CITRATE 25 MICROGRAM(S): 50 INJECTION INTRAVENOUS at 06:10

## 2017-07-16 RX ADMIN — CARBIDOPA AND LEVODOPA 2 TABLET(S): 25; 100 TABLET ORAL at 17:10

## 2017-07-16 RX ADMIN — Medication 1 APPLICATION(S): at 17:43

## 2017-07-16 RX ADMIN — Medication 1 SPRAY(S): at 14:02

## 2017-07-16 RX ADMIN — CARBIDOPA AND LEVODOPA 2 TABLET(S): 25; 100 TABLET ORAL at 12:25

## 2017-07-16 RX ADMIN — FOSPHENYTOIN 104 MILLIGRAM(S) PE: 50 INJECTION INTRAMUSCULAR; INTRAVENOUS at 13:57

## 2017-07-16 RX ADMIN — HEPARIN SODIUM 5000 UNIT(S): 5000 INJECTION INTRAVENOUS; SUBCUTANEOUS at 22:57

## 2017-07-16 RX ADMIN — FOSPHENYTOIN 104 MILLIGRAM(S) PE: 50 INJECTION INTRAMUSCULAR; INTRAVENOUS at 22:57

## 2017-07-16 RX ADMIN — FOSPHENYTOIN 104 MILLIGRAM(S) PE: 50 INJECTION INTRAMUSCULAR; INTRAVENOUS at 05:00

## 2017-07-16 RX ADMIN — CARBIDOPA AND LEVODOPA 2 TABLET(S): 25; 100 TABLET ORAL at 05:00

## 2017-07-16 RX ADMIN — CEFEPIME 100 MILLIGRAM(S): 1 INJECTION, POWDER, FOR SOLUTION INTRAMUSCULAR; INTRAVENOUS at 05:00

## 2017-07-16 RX ADMIN — PANTOPRAZOLE SODIUM 40 MILLIGRAM(S): 20 TABLET, DELAYED RELEASE ORAL at 08:34

## 2017-07-16 RX ADMIN — CARBIDOPA AND LEVODOPA 2 TABLET(S): 25; 100 TABLET ORAL at 23:01

## 2017-07-16 RX ADMIN — MIDAZOLAM HYDROCHLORIDE 2 MG/HR: 1 INJECTION, SOLUTION INTRAMUSCULAR; INTRAVENOUS at 22:57

## 2017-07-16 RX ADMIN — Medication 1 SPRAY(S): at 23:01

## 2017-07-16 RX ADMIN — CARBIDOPA AND LEVODOPA 2 TABLET(S): 25; 100 TABLET ORAL at 20:28

## 2017-07-16 RX ADMIN — HEPARIN SODIUM 5000 UNIT(S): 5000 INJECTION INTRAVENOUS; SUBCUTANEOUS at 13:57

## 2017-07-16 RX ADMIN — Medication 75 MICROGRAM(S): at 05:00

## 2017-07-16 NOTE — PROGRESS NOTE ADULT - SUBJECTIVE AND OBJECTIVE BOX
POD/STATUS POST/ENT ISSUE: s/p Nasal packing placement by ED for control of bleeding    INTERVAL HPI: no bleeding from nose or mouth per MICU staff/RN    Vital Signs Last 24 Hrs  T(C): 36.6 (16 Jul 2017 04:00), Max: 37.9 (15 Jul 2017 19:15)  T(F): 97.9 (16 Jul 2017 04:00), Max: 100.2 (15 Jul 2017 19:15)  HR: 78 (16 Jul 2017 07:45) (51 - 118)  BP: 113/59 (16 Jul 2017 07:00) (91/54 - 158/71)  BP(mean): 82 (16 Jul 2017 07:00) (66 - 102)  RR: 16 (16 Jul 2017 07:00) (16 - 24)  SpO2: 94% (16 Jul 2017 07:45) (80% - 100%)    PHYSICAL EXAM:  Gen: NAD, well-developed Intubated  Head: Normocephalic, Atraumatic  Eyes: PERRL, EOMI, no scleral injection  Nose: Right Rapid rhino in place no obvious bleeding, nasal packing deflated, irrigated with NS, and removed. scant residual blood in Right Nare no active bleeding. Left Nare clean no bleeding  Mouth: ETT in place Mucosa moist, tongue midline, no obvious bleeding from mouth, no blood visualized in mouth  Neck: Flat, supple, no lymphadenopathy, trachea midline, no masses  Resp: breathing easily, no stridor      LABS:                        10.2   9.7   )-----------( 132      ( 16 Jul 2017 02:16 )             31.4

## 2017-07-16 NOTE — PROGRESS NOTE ADULT - ATTENDING COMMENTS
Seen and examined on rounds with housestaff.  She is currently intubated and sedated on versed.  Arousable to voice and tactile stimuli.  Unable to follow commands.  VEEG revealed diffuse fast activity, likely an artifact from Versed.  Will f/u overnight VEEG to r/o subclinical seizures.  C/w fosphenytoin 100 TID and please check level.  Can begin to slowly taper Versed with therapeutic dilantin level and if EEG does not show underlying seizures.

## 2017-07-16 NOTE — PROGRESS NOTE ADULT - ASSESSMENT
62-y F with cognitive impairment, TBI c/b ICH, Drug-induced Parkinson's Disease (neuroleptic use), nonverbal at baseline, s/p PEG placement for aspiration, Depression, Schizophrenia, Hypothyroidism, GERD, BIBEMS for evaluation of epistaxis. Patient had intubation. She had episodes of desaturation and hypotension ( SBP ranging to 30's as per MICU progression note). She had eye deviation and intermittent stiffening activity. PE consisted with increased tone, intermittent stiffening and eye deviation noted along with her baseline rhythmic movement of the hands. Short 20 minutes EEG was ordered by MICU team as a part of evaluation.

## 2017-07-16 NOTE — PROGRESS NOTE ADULT - ASSESSMENT
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  Plan: Patient with long history of PD from neuroleptic use; patient severe rigidity, on high-dose sinemet, with recurrent aspiration with prior hospitalization.  - continue sinemet  - non-oral nutrition with tube feeds  - goals of care discussion in the past with family (patient refused hospice placement in the past). Mother of the patient has been making medical decisions but has recently suffered a stroke.  -Pt. was shown to have nonconvulsive status epilepticus on spot EEG- Started on fosphenytoin and versed drip , 24 hour video EEG didn't show any further epileptic activity overnight    #Respiratory  Acute respiratory failure with hypoxia and hypercapnia.  Plan: - Likely 2/2 Epistaxis, no PNA visualized on CXR.  US showed A-line predominant   - - wean FiO2, keep Sat >92%  - frequent suctioning  -  Check ABG and adjust Vent settings as needed  Last ABG WNL  - ENT evaluation for epistaxis- on board will remove nasal packing Sunday  - Protonix while on vent.     #Cardiac  Troponin trending down, No acute concerns on EKG    #ID  WBC count trending down , Procalcitonin 0.86, D/C Ancef, started on Cefepime/ Vanco for prophylaxis for nasal packing    #Heme  Epistaxis, recurrent.  Plan: - CBC stable. Monitor CBC daily  - Airway protected with ET Tube.    #Endo  Hypothyroidism.  Plan: Continue synthroid 75 mcg/daily  Check TSH level.   Phosphate level 2.3 -continue to monitor    #Prophylactic  Plan: HSQ. 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  Plan: Patient with long history of PD from neuroleptic use; patient severe rigidity, on high-dose sinemet, with recurrent aspiration with prior hospitalization.  - continue sinemet  - non-oral nutrition with tube feeds  - goals of care discussion in the past with family (patient refused hospice placement in the past). Mother of the patient has been making medical decisions but has recently suffered a stroke.  -Pt. was shown to have nonconvulsive status epilepticus on spot EEG- Started on fosphenytoin and versed drip , 24 hour video EEG didn't show any further epileptic activity overnight    #Respiratory  Acute respiratory failure with hypoxia and hypercapnia.  Plan: - Likely 2/2 Epistaxis, no PNA visualized on CXR.  US showed A-line predominant   - - wean FiO2, keep Sat >92%  - frequent suctioning  -  Check ABG and adjust Vent settings as needed  Last ABG WNL  - ENT evaluation for epistaxis- on board will remove nasal packing today  - Protonix while on vent.     #Cardiac  Troponin trending down, No acute concerns on EKG    #ID  WBC count trending down , Procalcitonin 0.86, D/C Ancef, started on Cefepime/ Vanco for prophylaxis for nasal packing    #Heme  Epistaxis, recurrent.  Plan: - CBC stable. Monitor CBC daily  - Airway protected with ET Tube.    #Endo  Hypothyroidism.  Plan: Continue synthroid 75 mcg/daily  Check TSH level.   Phosphate level 2.3 -continue to monitor    #Prophylactic  Plan: HSQ. 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  Plan: Patient with long history of PD from neuroleptic use; patient severe rigidity, on high-dose sinemet, with recurrent aspiration with prior hospitalization.  - continue sinemet  - non-oral nutrition with tube feeds  - goals of care discussion in the past with family (patient refused hospice placement in the past). Mother of the patient has been making medical decisions but has recently suffered a stroke.  -Pt. was shown to have nonconvulsive status epilepticus on spot EEG- Started on fosphenytoin and versed drip , 24 hour video EEG didn't show any further epileptic activity overnight    #Respiratory  Acute respiratory failure with hypoxia and hypercapnia.  Plan: - Likely 2/2 Epistaxis, no PNA visualized on CXR.  US showed A-line predominant   - - wean FiO2, keep Sat >92%  - frequent suctioning  -  Check ABG and adjust Vent settings as needed  Last ABG WNL  - ENT evaluation for epistaxis- on board will remove nasal packing today  - Protonix while on vent.     #Cardiac  Troponin trending down, No acute concerns on EKG    #ID  WBC count trending down , Procalcitonin 0.86, D/C Ancef, started on Cefepime/ Vanco for prophylaxis for nasal packing    #Heme  Epistaxis, recurrent.  Plan: - CBC stable. Monitor CBC daily  -Platelets down trended to 132  - Airway protected with ET Tube.    #Endo  Hypothyroidism.  Plan: Continue synthroid 75 mcg/daily  Check TSH level.   Phosphate level back up to 3.3    #Prophylactic  Plan: HSQ. 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  Plan: Patient with long history of PD from neuroleptic use; patient severe rigidity, on high-dose sinemet, with recurrent aspiration with prior hospitalization.  - continue sinemet  - non-oral nutrition with tube feeds  - goals of care discussion in the past with family (patient refused hospice placement in the past). Mother of the patient has been making medical decisions but has recently suffered a stroke.  -Pt. 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 Will slowly taper Versed with therapeutic dilantin level and monitor EEG does not show underlying seizures.   -Neurology following, recommendations appreciated.    #Respiratory  Acute respiratory failure with hypoxia and hypercapnia.  Plan: - Likely 2/2 Epistaxis, no PNA visualized on CXR.  US showed A-line predominant   - - wean FiO2, keep Sat >92%  - frequent suctioning  - ENT evaluation for epistaxis- on board will remove nasal packing today  - Protonix while on vent.     #Cardiac  Troponin trending down, No acute concerns on EKG    #Heme  Epistaxis, recurrent.  Plan: - CBC stable. Monitor CBC daily  -Platelets down trended to 132  :s/p Nasal packing removal  off ABX for packing prophylaxis  continue nasal saline b/l 3x/day, Bacitracin Ointment B/L 2x/day  Monitor for any signs of bleeding  monitor h/h  - Airway protected with ET Tube.    #Endo  Hypothyroidism.  Plan: Continue synthroid 75 mcg/daily  Check TSH level.   Phosphate level back up to 3.3    #Prophylactic  Plan: HSQ.

## 2017-07-16 NOTE — EEG REPORT - NS EEG TEXT BOX
Date: 7-15 to 7-16-16    Indication: Concern for seizure     FINDINGS:  The background was continuous and variable. The PDR was not present. The background consisted of continuous generalized irregular theta and delta frequency activity. There was accentuation of fast frequency activity with high amplitude at times sharply contoured over the right frontal-paracentral region (breach artifact).  Generalized fast activity was present.     Sleep Background:  Sleep architecture was not observed in this study     Interictal Epileptiform Activity:   Occasional sharp wave activity over the right frontal-paracentral region maximal at C4>F4     Events:  -No clinical events or seizures were recorded during this study.      Activation Procedures:   -Hyperventilation was not performed.    -Photic stimulation was not performed.     Artifacts:  Intermittent myogenic and movement artifacts were noted.    ECG:  The heart rate on single channel ECG was predominantly between 60-70 BPM.    Compressed Spectral Array Digital Analysis    FINDINGS:  Compressed Spectral Array (CSA) data was reviewed separately and correlated with the electroencephalographic findings detailed above.  CSA showed a variable spectral pattern.  Areas of increased power in particular were reviewed in detail, and compared with the raw EEG data.  Areas of abrupt increases in spectral power were reviewed to exclude seizures, and were determined to be artifactual in nature except during seizures above    The relative ratio of the power of delta range frequencies and faster frequencies remained stable over the course of the study.  There was no definitive increase in the relative power in the delta frequency spectrum apparent in the left hemisphere versus the right hemisphere.      Compressed Spectral Array (Digital Analysis) Summary/ Impression:  Persistent hemispheric asymmetry.  Intermittent areas of increased power reviewed, without definite epileptiform activity associated on CSA except during seizures above    EEG Classification / Summary:  Abnormal Routine EEG Study:  Occasional right frontal-central sharp waves   Moderate generalized slowing   Breach artifact over the right frontal-paracentral region   Generalized fast activity       Clinical Impression:  Findings indicate moderate diffuse or multifocal cerebral dysfunction with increased risk of focal onset seizure from the right frontal-central region.  Diffuse fast activity likely from medication effect.  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  No seizures.

## 2017-07-16 NOTE — PROGRESS NOTE ADULT - SUBJECTIVE AND OBJECTIVE BOX
Remains intubated and sedated.    62-y F with cognitive impairment, TBI c/b ICH, Drug-induced Parkinson's Disease (neuroleptic use), nonverbal at baseline, s/p PEG placement for aspiration, Depression, Schizophrenia, Hypothyroidism, GERD, BIBEMS for evaluation of epistaxis.  Patient has had multiple hospitalization in past several years for pneumonia due to aspiration.      At home patient noticed blood in her airway while doing suctioning.  (requires chronic suctioning to maintain airway).  She went to ER and there was noted to have acute desaturation and altered mental status, placed on BIPAP. However, pt was emergently intubated in setting 2/2 to hypoxia and hypercapnia. Of note patient has a history of nasal surgery in the past/ unclear exactly what was performed.     VS in the ED:   HR:116, BP:177/102, RR: 32, 83% O2 on BIPAP    Patient was not able to provide any history as she was intubated. When we reach to examine her, she had intermittent tonic activity followed by clonic activity. Her eyes were deviated to the right and upward. Overnight patient had episodes of hypotension, ranging SBP in 30's. Mother was bed side she reported that at baseline she has this abnormal movements but intermittent stiffening and eye deviation are new.       MEDICATIONS  (STANDING):  carbidopa/levodopa  25/100 2 Tablet(s) Oral every 4 hours  fentaNYL   Infusion 3.556 MICROgram(s)/kG/Hr (8 mL/Hr) IV Continuous <Continuous>  heparin  Injectable 5000 Unit(s) SubCutaneous every 8 hours  levothyroxine 75 MICROGram(s) Oral daily  pantoprazole   Suspension 40 milliGRAM(s) Oral before breakfast  norepinephrine Infusion 0.01 MICROgram(s)/kG/Min (0.909 mL/Hr) IV Continuous <Continuous>  propofol Infusion 5 MICROgram(s)/kG/Min (1.455 mL/Hr) IV Continuous <Continuous>  LORazepam     Tablet 2 milliGRAM(s) Oral once  LORazepam   Injectable 2 milliGRAM(s) IV Push once  vancomycin  IVPB 750 milliGRAM(s) IV Intermittent every 12 hours  cefepime  IVPB 1000 milliGRAM(s) IV Intermittent once  cefepime  IVPB 1000 milliGRAM(s) IV Intermittent every 8 hours  cefepime  IVPB   IV Intermittent     MEDICATIONS  (PRN):    PAST MEDICAL & SURGICAL HISTORY:  Cognitive impairment  Traumatic brain injury, without loss of consciousness, sequela  Mitral valve prolapse  Major depressive disorder, recurrent episode, unspecified severity  Gastroesophageal reflux disease, esophagitis presence not specified  Hypothyroidism  Depression  Incontinence of urine  Parkinson's disease (tremor, stiffness, slow motion, unstable posture)  Scoliosis  Appendix disease  Intestinal disorder  Pharynx disease  Tonsil and adenoid disease, chronic  Hernia    FAMILY HISTORY:  Family history of arthritis (Sibling)  Family history of Alzheimer's disease    Allergies    sulfa drugs (Unknown)    Intolerances        SHx - No smoking, No ETOH, No drug abuse      Review of Systems:  CONSTITUTIONAL:  No weight loss, fever, chills, weakness or fatigue.  HEENT:  Eyes:  No visual loss, blurred vision, double vision or yellow sclerae. Ears, Nose, Throat:  No hearing loss, sneezing, congestion, runny nose or sore throat.  SKIN:  No rash or itching.  CARDIOVASCULAR:  No chest pain, chest pressure or chest discomfort. No palpitations or edema.  RESPIRATORY:  No shortness of breath, cough or sputum.  GASTROINTESTINAL:  No anorexia, nausea, vomiting or diarrhea. No abdominal pain or blood.  GENITOURINARY:  NO Burning on urination.   NEUROLOGICAL: See HPI  MUSCULOSKELETAL:  No muscle, back pain, joint pain or stiffness.  HEMATOLOGIC:  No anemia, bleeding or bruising.  LYMPHATICS:  No enlarged nodes. No history of splenectomy.  PSYCHIATRIC:  No history of depression or anxiety.  ENDOCRINOLOGIC:  No reports of sweating, cold or heat intolerance. No polyuria or polydipsia.  ALLERGIES:  No history of asthma, hives, eczema or rhinitis.        Vital Signs Last 24 Hrs  T(C): 37.3 (14 Jul 2017 08:00), Max: 37.3 (14 Jul 2017 08:00)  T(F): 99.1 (14 Jul 2017 08:00), Max: 99.1 (14 Jul 2017 08:00)  HR: 66 (14 Jul 2017 10:15) (53 - 106)  BP: 97/55 (14 Jul 2017 10:00) (37/20 - 200/89)  BP(mean): 70 (14 Jul 2017 10:00) (25 - 128)  RR: 20 (14 Jul 2017 10:15) (19 - 45)  SpO2: 98% (14 Jul 2017 10:15) (73% - 100%)    General Exam:     neurological examination :     she was sedated with fentanyl and her examination is limited   no facial asymmetry  Pupil equal pin point    oculocephalic reflex was absent  tone increased in her upper and lower extremity   resting tremor seen in bilateral extremity, more in left   she was not withdrawing to painful stimuli       07-14    141  |  105  |  26<H>  ----------------------------<  130<H>  3.9   |  22  |  0.58    Ca    8.0<L>      14 Jul 2017 01:35  Phos  3.1     07-14  Mg     1.8     07-14    TPro  5.4<L>  /  Alb  2.9<L>  /  TBili  0.3  /  DBili  x   /  AST  30  /  ALT  5<L>  /  AlkPhos  49  07-14 07-14    141  |  105  |  26<H>  ----------------------------<  130<H>  3.9   |  22  |  0.58    Ca    8.0<L>      14 Jul 2017 01:35  Phos  3.1     07-14  Mg     1.8     07-14    TPro  5.4<L>  /  Alb  2.9<L>  /  TBili  0.3  /  DBili  x   /  AST  30  /  ALT  5<L>  /  AlkPhos  49  07-14                          10.4   24.0  )-----------( 181      ( 14 Jul 2017 10:35 )             31.4       Radiology    no recent imaging

## 2017-07-16 NOTE — PROGRESS NOTE ADULT - SUBJECTIVE AND OBJECTIVE BOX
CHIEF COMPLAINT: Epistaxis    Interval Events: Patient showed no epileptic activity overnight, no acute concerns , ENT plan for remove nasal packing tomorrow    REVIEW OF SYSTEMS:  Constitutional: [ ] negative [ ] fevers [ ] chills [ ] weight loss [ ] weight gain  HEENT: [ ] negative [ ] dry eyes [ ] eye irritation [ ] postnasal drip [ ] nasal congestion  CV: [ ] negative  [ ] chest pain [ ] orthopnea [ ] palpitations [ ] murmur  Resp: [ ] negative [ ] cough [ ] shortness of breath [ ] dyspnea [ ] wheezing [ ] sputum [ ] hemoptysis  GI: [ ] negative [ ] nausea [ ] vomiting [ ] diarrhea [ ] constipation [ ] abd pain [ ] dysphagia   : [ ] negative [ ] dysuria [ ] nocturia [ ] hematuria [ ] increased urinary frequency  Musculoskeletal: [ ] negative [ ] back pain [ ] myalgias [ ] arthralgias [ ] fracture  Skin: [ ] negative [ ] rash [ ] itch  Neurological: [ ] negative [ ] headache [ ] dizziness [ ] syncope [ ] weakness [ ] numbness  Psychiatric: [ ] negative [ ] anxiety [ ] depression  Endocrine: [ ] negative [ ] diabetes [ ] thyroid problem  Hematologic/Lymphatic: [ ] negative [ ] anemia [ ] bleeding problem  Allergic/Immunologic: [ ] negative [ ] itchy eyes [ ] nasal discharge [ ] hives [ ] angioedema  [ ] All other systems negative  [X] Unable to assess ROS because Pt. was sedated and intubated.    OBJECTIVE:  ICU Vital Signs Last 24 Hrs  T(C): 36.9 (15 Jul 2017 11:00), Max: 37.3 (15 Jul 2017 00:00)  T(F): 98.4 (15 Jul 2017 11:00), Max: 99.2 (15 Jul 2017 00:00)  HR: 52 (15 Jul 2017 14:30) (50 - 73)  BP: 105/57 (15 Jul 2017 14:30) (64/40 - 138/57)  BP(mean): 77 (15 Jul 2017 14:30) (48 - 93)  ABP: --  ABP(mean): --  RR: 16 (15 Jul 2017 14:30) (13 - 37)  SpO2: 99% (15 Jul 2017 14:30) (84% - 100%)    Mode: AC/ CMV (Assist Control/ Continuous Mandatory Ventilation), RR (machine): 16, TV (machine): 400, FiO2: 30, PEEP: 5, ITime: 1, MAP: 12, PIP: 35     @ 07:  -  07-15 @ 07:00  --------------------------------------------------------  IN: 2891.6 mL / OUT: 20 mL / NET: 2871.6 mL    07-15 @ 07:01  -  07-15 @ 14:36  --------------------------------------------------------  IN: 858 mL / OUT: 30 mL / NET: 828 mL      CAPILLARY BLOOD GLUCOSE      Physical Exam:   General:  NAD, patient has some agitated movements  HEENT: PERRLA, EOMI, moist mucous membranes  Neurology: Intubated/Sedated  Respiratory: CTA B/L, normal respiratory effort, no wheezes, crackles, rales  CV: RRR, S1S2, no murmurs, rubs or gallops  Abdominal: Soft, NT, ND +BS. PEG tube in place  Extremities: No edema, + peripheral pulses  Skin: Dry/Warm    LINES:    HOSPITAL MEDICATIONS:  heparin  Injectable 5000 Unit(s) SubCutaneous every 8 hours    vancomycin  IVPB 750 milliGRAM(s) IV Intermittent every 12 hours  cefepime  IVPB 1000 milliGRAM(s) IV Intermittent every 8 hours  cefepime  IVPB   IV Intermittent     norepinephrine Infusion 0.01 MICROgram(s)/kG/Min IV Continuous <Continuous>    levothyroxine 75 MICROGram(s) Oral daily      carbidopa/levodopa  25/100 2 Tablet(s) Oral every 4 hours  midazolam Infusion 6 mG/Hr IV Continuous <Continuous>  fosphenytoin IVPB 100 milliGRAM(s) PE IV Intermittent every 8 hours    pantoprazole   Suspension 40 milliGRAM(s) Oral before breakfast      LABS:                        10.6   15.4  )-----------( 165      ( 15 Jul 2017 02:40 )             31.8     Hgb Trend: 10.6<--, 10.4<--, 11.7<--, 14.4<--  07-15    137  |  104  |  21  ----------------------------<  140<H>  3.8   |  22  |  0.51    Ca    8.0<L>      15 Jul 2017 02:40  Phos  2.3     07-15  Mg     1.8     07-15    TPro  5.5<L>  /  Alb  3.0<L>  /  TBili  0.3  /  DBili  x   /  AST  25  /  ALT  5<L>  /  AlkPhos  55  07-15    Creatinine Trend: 0.51<--, 0.58<--, 0.92<--, 0.55<--  PT/INR - ( 2017 01:35 )   PT: 12.0 sec;   INR: 1.10 ratio         PTT - ( 2017 01:35 )  PTT:33.8 sec  Urinalysis Basic - ( 2017 02:55 )    Color: Yellow / Appearance: Clear / S.021 / pH: x  Gluc: x / Ketone: Trace  / Bili: Negative / Urobili: Negative   Blood: x / Protein: Trace / Nitrite: Negative   Leuk Esterase: Trace / RBC: 2-5 /HPF / WBC 2-5 /HPF   Sq Epi: x / Non Sq Epi: x / Bacteria: x      Arterial Blood Gas:  07-15 @ 09:15  7.42/39/81/25/96/1.0  ABG lactate: --  Arterial Blood Gas:   15:59  7.50/32/95/24/97/2.1  ABG lactate: --  Arterial Blood Gas:   @ 01:34  7.50/32/112/24/99/2.2  ABG lactate: --      MICROBIOLOGY:  Culture - Urine (17 @ 12:35)    Specimen Source: .Urine Clean Catch (Midstream)    Culture Results:   No growth    Culture - Blood (17 @ 05:06)    Specimen Source: .Blood Blood    Culture Results:   No growth to date.       RADIOLOGY:  [X] Reviewed and interpreted by me  < from: Xray Chest 1 View AP- PORTABLE-Urgent (17 @ 11:21) >  Impression: Endotracheal tube in good position. Mild left basilar   atelectasis. Otherwise, clear lungs.      EKG:  < from: 12 Lead ECG (17 @ 11:13) >  Diagnosis Line SINUS RHYTHM WITH PREMATURE SUPRAVENTRICULAR COMPLEXES  RIGHT ATRIAL ENLARGEMENT  LEFT AXIS DEVIATION  PULMONARY DISEASE PATTERN  NONSPECIFIC ST AND T WAVE ABNORMALITY    EEG:    FINDINGS:  Compressed Spectral Array (CSA) data was reviewed separately and correlated with the electroencephalographic findings detailed above.  CSA showed a variable spectral pattern.  Areas of increased power in particular were reviewed in detail, and compared with the raw EEG data.  Areas of abrupt increases in spectral power were reviewed to exclude seizures, and were determined to be artifactual in nature except during seizures above    The relative ratio of the power of delta range frequencies and faster frequencies remained stable over the course of the study.  There was no definitive increase in the relative power in the delta frequency spectrum apparent in the left hemisphere versus the right hemisphere.      Compressed Spectral Array (Digital Analysis) Summary/ Impression:  No persistent hemispheric asymmetry.  Intermittent areas of increased power reviewed, without definite epileptiform activity associated on CSA except during seizures above    EEG Classification / Summary:  Abnormal Routine EEG Study:  Occasional right frontal-central sharp waves   Moderate generalized slowing   Breach artifact over the right frontal-paracentral region   Generalized fast activity       Clinical Impression:  Findings indicate moderate diffuse or multifocal cerebral dysfunction with increased risk of focal onset seizure from the right frontal-central region.  Diffuse fast activity likely from medication effect.  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  No seizures. CHIEF COMPLAINT: Epistaxis    Interval Events: Patient showed no epileptic activity overnight, no acute concerns , ENT removing the nasal packing this morning    REVIEW OF SYSTEMS:  Constitutional: [ ] negative [ ] fevers [ ] chills [ ] weight loss [ ] weight gain  HEENT: [ ] negative [ ] dry eyes [ ] eye irritation [ ] postnasal drip [ ] nasal congestion  CV: [ ] negative  [ ] chest pain [ ] orthopnea [ ] palpitations [ ] murmur  Resp: [ ] negative [ ] cough [ ] shortness of breath [ ] dyspnea [ ] wheezing [ ] sputum [ ] hemoptysis  GI: [ ] negative [ ] nausea [ ] vomiting [ ] diarrhea [ ] constipation [ ] abd pain [ ] dysphagia   : [ ] negative [ ] dysuria [ ] nocturia [ ] hematuria [ ] increased urinary frequency  Musculoskeletal: [ ] negative [ ] back pain [ ] myalgias [ ] arthralgias [ ] fracture  Skin: [ ] negative [ ] rash [ ] itch  Neurological: [ ] negative [ ] headache [ ] dizziness [ ] syncope [ ] weakness [ ] numbness  Psychiatric: [ ] negative [ ] anxiety [ ] depression  Endocrine: [ ] negative [ ] diabetes [ ] thyroid problem  Hematologic/Lymphatic: [ ] negative [ ] anemia [ ] bleeding problem  Allergic/Immunologic: [ ] negative [ ] itchy eyes [ ] nasal discharge [ ] hives [ ] angioedema  [ ] All other systems negative  [X] Unable to assess ROS because Pt. was sedated and intubated.    OBJECTIVE:  ICU Vital Signs Last 24 Hrs  T(C): 36.6 (16 Jul 2017 04:00), Max: 37.9 (15 Jul 2017 19:15)  T(F): 97.9 (16 Jul 2017 04:00), Max: 100.2 (15 Jul 2017 19:15)  HR: 70 (16 Jul 2017 07:00) (51 - 118)  BP: 113/59 (16 Jul 2017 07:00) (91/54 - 158/71)  BP(mean): 82 (16 Jul 2017 07:00) (66 - 102)  ABP: --  ABP(mean): --  RR: 16 (16 Jul 2017 07:00) (16 - 24)  SpO2: 98% (16 Jul 2017 07:00) (80% - 100%)    Mode: AC/ CMV (Assist Control/ Continuous Mandatory Ventilation), RR (machine): 16, TV (machine): 400, FiO2: 30, PEEP: 5, ITime: 1, MAP: 11, PIP: 30    07-15 @ 07:01  -  07-16 @ 07:00  --------------------------------------------------------  IN: 2567.8 mL / OUT: 40 mL / NET: 2527.8 mL      CAPILLARY BLOOD GLUCOSE    Physical Exam:   General:  NAD, patient has some agitated movements  HEENT: PERRLA, EOMI, moist mucous membranes  Neurology: Intubated/Sedated  Respiratory: CTA B/L, normal respiratory effort, no wheezes, crackles, rales  CV: RRR, S1S2, no murmurs, rubs or gallops  Abdominal: Soft, NT, ND +BS. PEG tube in place  Extremities: No edema, + peripheral pulses  Skin: Dry/Warm      LINES:    HOSPITAL MEDICATIONS:  heparin  Injectable 5000 Unit(s) SubCutaneous every 8 hours    cefepime  IVPB 1000 milliGRAM(s) IV Intermittent every 8 hours  cefepime  IVPB   IV Intermittent   vancomycin  IVPB 1000 milliGRAM(s) IV Intermittent every 12 hours    norepinephrine Infusion 0.01 MICROgram(s)/kG/Min IV Continuous <Continuous>    levothyroxine 75 MICROGram(s) Oral daily      carbidopa/levodopa  25/100 2 Tablet(s) Oral every 4 hours  midazolam Infusion 6 mG/Hr IV Continuous <Continuous>  fosphenytoin IVPB 100 milliGRAM(s) PE IV Intermittent every 8 hours    pantoprazole   Suspension 40 milliGRAM(s) Oral before breakfast        LABS:                        10.2   9.7   )-----------( 132      ( 16 Jul 2017 02:16 )             31.4     Hgb Trend: 10.2<--, 10.6<--, 10.4<--, 11.7<--, 14.4<--  07-16    138  |  104  |  15  ----------------------------<  98  3.9   |  24  |  0.45<L>    Ca    8.0<L>      16 Jul 2017 02:16  Phos  3.3     07-16  Mg     1.8     07-16    TPro  5.8<L>  /  Alb  2.9<L>  /  TBili  0.3  /  DBili  x   /  AST  19  /  ALT  7<L>  /  AlkPhos  59  07-16    Creatinine Trend: 0.45<--, 0.51<--, 0.58<--, 0.92<--, 0.55<--      Arterial Blood Gas:  07-15 @ 09:15  7.42/39/81/25/96/1.0  ABG lactate: --  Arterial Blood Gas:  07-14 @ 15:59  7.50/32/95/24/97/2.1  ABG lactate: --        MICROBIOLOGY:   Culture - Blood (07.14.17 @ 14:01)    Specimen Source: .Blood Blood    Culture Results:   No growth to date.    Culture - Urine (07.14.17 @ 12:35)    Specimen Source: .Urine Clean Catch (Midstream)    Culture Results:   No growth        RADIOLOGY:  [X] Reviewed and interpreted by me  < from: Xray Chest 1 View AP- PORTABLE-Urgent (07.13.17 @ 11:21) >  Impression: Endotracheal tube in good position. Mild left basilar   atelectasis. Otherwise, clear lungs.      EKG:  < from: 12 Lead ECG (07.13.17 @ 11:13) >  Diagnosis Line SINUS RHYTHM WITH PREMATURE SUPRAVENTRICULAR COMPLEXES  RIGHT ATRIAL ENLARGEMENT  LEFT AXIS DEVIATION  PULMONARY DISEASE PATTERN  NONSPECIFIC ST AND T WAVE ABNORMALITY    EEG:    FINDINGS:  Compressed Spectral Array (CSA) data was reviewed separately and correlated with the electroencephalographic findings detailed above.  CSA showed a variable spectral pattern.  Areas of increased power in particular were reviewed in detail, and compared with the raw EEG data.  Areas of abrupt increases in spectral power were reviewed to exclude seizures, and were determined to be artifactual in nature except during seizures above    The relative ratio of the power of delta range frequencies and faster frequencies remained stable over the course of the study.  There was no definitive increase in the relative power in the delta frequency spectrum apparent in the left hemisphere versus the right hemisphere.      Compressed Spectral Array (Digital Analysis) Summary/ Impression:  No persistent hemispheric asymmetry.  Intermittent areas of increased power reviewed, without definite epileptiform activity associated on CSA except during seizures above    EEG Classification / Summary:  Abnormal Routine EEG Study:  Occasional right frontal-central sharp waves   Moderate generalized slowing   Breach artifact over the right frontal-paracentral region   Generalized fast activity       Clinical Impression:  Findings indicate moderate diffuse or multifocal cerebral dysfunction with increased risk of focal onset seizure from the right frontal-central region.  Diffuse fast activity likely from medication effect.  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  No seizures.

## 2017-07-16 NOTE — PROGRESS NOTE ADULT - PROBLEM SELECTOR PLAN 1
s/p Nasal packing removal  can d/c ABX for packing prophylaxis  continue nasal saline b/l 3x/day, Bacitracin Ointment B/L 2x/day  HOB elevation, avoid nasal trauma, straining  Monitor for any signs of bleeding  monitor h/h  MICU care

## 2017-07-16 NOTE — PROGRESS NOTE ADULT - ASSESSMENT
62yoF with Right Epistaxis, controlled with nasal packing s/p Intubation for Airway protection. s/p Nasal packing removal today

## 2017-07-16 NOTE — PROGRESS NOTE ADULT - ATTENDING COMMENTS
Ms. Malloy is awake alert and responsive, not seizing by EEG.  Will start to decrease versed very slowly and assess for repeat seizures.  Continue weaning trials.  Nasal packing removed today without adverse consequences.  Will stop antibioitcs.    She remains critically ill.    ET 40 min.

## 2017-07-17 LAB
ALBUMIN SERPL ELPH-MCNC: 3.1 G/DL — LOW (ref 3.3–5)
ALP SERPL-CCNC: 66 U/L — SIGNIFICANT CHANGE UP (ref 40–120)
ALT FLD-CCNC: 6 U/L RC — LOW (ref 10–45)
ANION GAP SERPL CALC-SCNC: 11 MMOL/L — SIGNIFICANT CHANGE UP (ref 5–17)
APTT BLD: 37.1 SEC — SIGNIFICANT CHANGE UP (ref 27.5–37.4)
AST SERPL-CCNC: 23 U/L — SIGNIFICANT CHANGE UP (ref 10–40)
BASOPHILS # BLD AUTO: 0.1 K/UL — SIGNIFICANT CHANGE UP (ref 0–0.2)
BASOPHILS NFR BLD AUTO: 0.8 % — SIGNIFICANT CHANGE UP (ref 0–2)
BILIRUB SERPL-MCNC: 0.3 MG/DL — SIGNIFICANT CHANGE UP (ref 0.2–1.2)
BUN SERPL-MCNC: 13 MG/DL — SIGNIFICANT CHANGE UP (ref 7–23)
CALCIUM SERPL-MCNC: 8.4 MG/DL — SIGNIFICANT CHANGE UP (ref 8.4–10.5)
CHLORIDE SERPL-SCNC: 105 MMOL/L — SIGNIFICANT CHANGE UP (ref 96–108)
CO2 SERPL-SCNC: 28 MMOL/L — SIGNIFICANT CHANGE UP (ref 22–31)
CREAT SERPL-MCNC: 0.39 MG/DL — LOW (ref 0.5–1.3)
EOSINOPHIL # BLD AUTO: 0.2 K/UL — SIGNIFICANT CHANGE UP (ref 0–0.5)
EOSINOPHIL NFR BLD AUTO: 2.8 % — SIGNIFICANT CHANGE UP (ref 0–6)
GLUCOSE SERPL-MCNC: 119 MG/DL — HIGH (ref 70–99)
HCT VFR BLD CALC: 31.2 % — LOW (ref 34.5–45)
HGB BLD-MCNC: 10.5 G/DL — LOW (ref 11.5–15.5)
INR BLD: 0.96 RATIO — SIGNIFICANT CHANGE UP (ref 0.88–1.16)
LYMPHOCYTES # BLD AUTO: 0.6 K/UL — LOW (ref 1–3.3)
LYMPHOCYTES # BLD AUTO: 8.5 % — LOW (ref 13–44)
MAGNESIUM SERPL-MCNC: 1.9 MG/DL — SIGNIFICANT CHANGE UP (ref 1.6–2.6)
MCHC RBC-ENTMCNC: 33.6 GM/DL — SIGNIFICANT CHANGE UP (ref 32–36)
MCHC RBC-ENTMCNC: 34 PG — SIGNIFICANT CHANGE UP (ref 27–34)
MCV RBC AUTO: 101 FL — HIGH (ref 80–100)
MONOCYTES # BLD AUTO: 0.5 K/UL — SIGNIFICANT CHANGE UP (ref 0–0.9)
MONOCYTES NFR BLD AUTO: 7.2 % — SIGNIFICANT CHANGE UP (ref 2–14)
NEUTROPHILS # BLD AUTO: 6 K/UL — SIGNIFICANT CHANGE UP (ref 1.8–7.4)
NEUTROPHILS NFR BLD AUTO: 80.6 % — HIGH (ref 43–77)
PHOSPHATE SERPL-MCNC: 3.1 MG/DL — SIGNIFICANT CHANGE UP (ref 2.5–4.5)
PLATELET # BLD AUTO: 135 K/UL — LOW (ref 150–400)
POTASSIUM SERPL-MCNC: 3.8 MMOL/L — SIGNIFICANT CHANGE UP (ref 3.5–5.3)
POTASSIUM SERPL-SCNC: 3.8 MMOL/L — SIGNIFICANT CHANGE UP (ref 3.5–5.3)
PROT SERPL-MCNC: 6.1 G/DL — SIGNIFICANT CHANGE UP (ref 6–8.3)
PROTHROM AB SERPL-ACNC: 10.5 SEC — SIGNIFICANT CHANGE UP (ref 9.8–12.7)
RBC # BLD: 3.09 M/UL — LOW (ref 3.8–5.2)
RBC # FLD: 13.4 % — SIGNIFICANT CHANGE UP (ref 10.3–14.5)
SODIUM SERPL-SCNC: 144 MMOL/L — SIGNIFICANT CHANGE UP (ref 135–145)
WBC # BLD: 7.5 K/UL — SIGNIFICANT CHANGE UP (ref 3.8–10.5)
WBC # FLD AUTO: 7.5 K/UL — SIGNIFICANT CHANGE UP (ref 3.8–10.5)

## 2017-07-17 PROCEDURE — 99291 CRITICAL CARE FIRST HOUR: CPT

## 2017-07-17 PROCEDURE — 99232 SBSQ HOSP IP/OBS MODERATE 35: CPT

## 2017-07-17 PROCEDURE — 95951: CPT | Mod: 26

## 2017-07-17 PROCEDURE — 95957 EEG DIGITAL ANALYSIS: CPT | Mod: 26

## 2017-07-17 RX ORDER — DEXMEDETOMIDINE HYDROCHLORIDE IN 0.9% SODIUM CHLORIDE 4 UG/ML
0.3 INJECTION INTRAVENOUS
Qty: 200 | Refills: 0 | Status: DISCONTINUED | OUTPATIENT
Start: 2017-07-17 | End: 2017-07-21

## 2017-07-17 RX ADMIN — CHLORHEXIDINE GLUCONATE 15 MILLILITER(S): 213 SOLUTION TOPICAL at 17:53

## 2017-07-17 RX ADMIN — CARBIDOPA AND LEVODOPA 2 TABLET(S): 25; 100 TABLET ORAL at 08:48

## 2017-07-17 RX ADMIN — Medication 1 MILLIGRAM(S): at 23:14

## 2017-07-17 RX ADMIN — CHLORHEXIDINE GLUCONATE 15 MILLILITER(S): 213 SOLUTION TOPICAL at 05:24

## 2017-07-17 RX ADMIN — Medication 1 APPLICATION(S): at 17:53

## 2017-07-17 RX ADMIN — CARBIDOPA AND LEVODOPA 2 TABLET(S): 25; 100 TABLET ORAL at 16:07

## 2017-07-17 RX ADMIN — CARBIDOPA AND LEVODOPA 2 TABLET(S): 25; 100 TABLET ORAL at 11:18

## 2017-07-17 RX ADMIN — Medication 75 MICROGRAM(S): at 05:24

## 2017-07-17 RX ADMIN — FOSPHENYTOIN 104 MILLIGRAM(S) PE: 50 INJECTION INTRAMUSCULAR; INTRAVENOUS at 23:11

## 2017-07-17 RX ADMIN — HEPARIN SODIUM 5000 UNIT(S): 5000 INJECTION INTRAVENOUS; SUBCUTANEOUS at 23:11

## 2017-07-17 RX ADMIN — Medication 1 SPRAY(S): at 23:11

## 2017-07-17 RX ADMIN — CARBIDOPA AND LEVODOPA 2 TABLET(S): 25; 100 TABLET ORAL at 19:29

## 2017-07-17 RX ADMIN — MIDAZOLAM HYDROCHLORIDE 2 MG/HR: 1 INJECTION, SOLUTION INTRAMUSCULAR; INTRAVENOUS at 08:00

## 2017-07-17 RX ADMIN — Medication 1 SPRAY(S): at 05:24

## 2017-07-17 RX ADMIN — Medication 1 SPRAY(S): at 13:36

## 2017-07-17 RX ADMIN — Medication 1 APPLICATION(S): at 05:24

## 2017-07-17 RX ADMIN — CARBIDOPA AND LEVODOPA 2 TABLET(S): 25; 100 TABLET ORAL at 03:51

## 2017-07-17 RX ADMIN — DEXMEDETOMIDINE HYDROCHLORIDE IN 0.9% SODIUM CHLORIDE 3.64 MICROGRAM(S)/KG/HR: 4 INJECTION INTRAVENOUS at 17:52

## 2017-07-17 RX ADMIN — FOSPHENYTOIN 104 MILLIGRAM(S) PE: 50 INJECTION INTRAMUSCULAR; INTRAVENOUS at 05:24

## 2017-07-17 RX ADMIN — Medication 4 MILLIGRAM(S): at 01:56

## 2017-07-17 RX ADMIN — Medication 1 MILLIGRAM(S): at 18:15

## 2017-07-17 RX ADMIN — HEPARIN SODIUM 5000 UNIT(S): 5000 INJECTION INTRAVENOUS; SUBCUTANEOUS at 13:35

## 2017-07-17 RX ADMIN — CARBIDOPA AND LEVODOPA 2 TABLET(S): 25; 100 TABLET ORAL at 23:11

## 2017-07-17 RX ADMIN — PANTOPRAZOLE SODIUM 40 MILLIGRAM(S): 20 TABLET, DELAYED RELEASE ORAL at 06:12

## 2017-07-17 RX ADMIN — HEPARIN SODIUM 5000 UNIT(S): 5000 INJECTION INTRAVENOUS; SUBCUTANEOUS at 05:24

## 2017-07-17 RX ADMIN — FOSPHENYTOIN 104 MILLIGRAM(S) PE: 50 INJECTION INTRAMUSCULAR; INTRAVENOUS at 13:00

## 2017-07-17 NOTE — PROGRESS NOTE ADULT - PROBLEM SELECTOR PLAN 1
s/p Nasal packing removal  continue nasal saline b/l 3x/day, Bacitracin Ointment B/L 2x/day  HOB elevation, avoid nasal trauma, straining  Monitor for any signs of bleeding  monitor h/h

## 2017-07-17 NOTE — PROGRESS NOTE ADULT - ASSESSMENT
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  Plan: Patient with long history of PD from neuroleptic use; patient severe rigidity, on high-dose sinemet, with recurrent aspiration with prior hospitalization.  - continue sinemet  - non-oral nutrition with tube feeds  - goals of care discussion in the past with family (patient refused hospice placement in the past). Mother of the patient has been making medical decisions but has recently suffered a stroke.  -Pt. 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 Will slowly taper Versed with therapeutic dilantin level and monitor EEG does not show underlying seizures.   -Neurology following, recommendations appreciated.    #Respiratory  Acute respiratory failure with hypoxia and hypercapnia.  Plan: - Likely 2/2 Epistaxis, no PNA visualized on CXR.  US showed A-line predominant   - - wean FiO2, keep Sat >92%  - frequent suctioning  - ENT evaluation for epistaxis- on board will remove nasal packing today  - Protonix while on vent.     #Cardiac  Troponin trending down, No acute concerns on EKG    #Heme  Epistaxis, recurrent.  Plan: - CBC stable. Monitor CBC daily  -Platelets down trended to 132  :s/p Nasal packing removal  off ABX for packing prophylaxis  continue nasal saline b/l 3x/day, Bacitracin Ointment B/L 2x/day  Monitor for any signs of bleeding  monitor h/h  - Airway protected with ET Tube.    #Endo  Hypothyroidism.  Plan: Continue synthroid 75 mcg/daily  Check TSH level.   Phosphate level back up to 3.3    #Prophylactic  Plan: HSQ. 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  Plan: Patient with long history of PD from neuroleptic use; patient severe rigidity, on high-dose sinemet, with recurrent aspiration with prior hospitalization.  - continue sinemet  - non-oral nutrition with tube feeds  - goals of care discussion in the past with family (patient refused hospice placement in the past). Mother of the patient has been making medical decisions but has recently suffered a stroke.  -Pt. 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 Tapered off versed monitor therapeutic dilantin level and monitor EEG does not show underlying seizures.   -Neurology following, recommendations appreciated.    #Respiratory  Acute respiratory failure with hypoxia and hypercapnia.  Plan: - Likely 2/2 Epistaxis, no PNA visualized on CXR.  US showed A-line predominant   - - wean FiO2, keep Sat >92%  - frequent suctioning  - ENT evaluation for epistaxis- now resolved  - Protonix while on vent.     #Cardiac  Troponin trending down, No acute concerns on EKG    #Heme  Epistaxis, recurrent.  Plan: - CBC stable. Monitor CBC daily  -Platelets stable 135  :s/p Nasal packing removal  off ABX for packing prophylaxis  continue nasal saline b/l 3x/day, Bacitracin Ointment B/L 2x/day  Monitor for any signs of bleeding  monitor h/h  - Airway protected with ET Tube.    #Endo  Hypothyroidism.  Plan: Continue synthroid 75 mcg/daily  Check TSH level.     #Prophylactic  Plan: HSQ.

## 2017-07-17 NOTE — PROGRESS NOTE ADULT - SUBJECTIVE AND OBJECTIVE BOX
Neurology follow up note    Name  DAVEY ROSENBAUM    Subjective: The patient is visited on the bedside, she follows simple commands and moves all her limbs L>R. EEG did not show any defined epileptiform activities.    Review of Systems:  Negative except above    MEDICATIONS  (STANDING):  carbidopa/levodopa  25/100 2 Tablet(s) Oral every 4 hours  heparin  Injectable 5000 Unit(s) SubCutaneous every 8 hours  levothyroxine 75 MICROGram(s) Oral daily  pantoprazole   Suspension 40 milliGRAM(s) Oral before breakfast  fosphenytoin IVPB 100 milliGRAM(s) PE IV Intermittent every 8 hours  sodium chloride 0.65% Nasal 1 Spray(s) Both Nostrils three times a day  BACItracin   Ointment 1 Application(s) Topical two times a day  midazolam Infusion 2 mG/Hr (2 mL/Hr) IV Continuous <Continuous>  chlorhexidine 0.12% Liquid 15 milliLiter(s) Swish and Spit two times a day    MEDICATIONS  (PRN):      Allergies    sulfa drugs (Unknown)    Intolerances        Objective:   Vital Signs Last 24 Hrs  T(C): 37.1 (17 Jul 2017 07:00), Max: 37.3 (17 Jul 2017 00:00)  T(F): 98.7 (17 Jul 2017 07:00), Max: 99.1 (17 Jul 2017 00:00)  HR: 59 (17 Jul 2017 10:00) (53 - 141)  BP: 98/53 (17 Jul 2017 10:00) (98/53 - 153/83)  BP(mean): 71 (17 Jul 2017 10:00) (71 - 111)  RR: 12 (17 Jul 2017 10:00) (12 - 20)  SpO2: 100% (17 Jul 2017 10:00) (87% - 100%)    General Exam:   General appearance: No acute distress                 Cardiovascular: Pedal dorsalis pulses intact bilaterally    Neurological Exam:  Mental Status: Orientated to self, date and place.  Attention intact.  No dysarthria, aphasia or neglect.  Knowledge intact.  Registration intact.  Short and long term memory grossly intact.      Cranial Nerves: CN I - not tested.  PERRL, EOMI, VFF, no nystagmus or diplopia.  No APD.  Fundi not visualized bilaterally.  CN V1-3 intact to light touch and pinprick.  No facial asymmetry.  Hearing intact to finger rub bilaterally.  Tongue, uvula and palate midline.  Sternocleidomastoid and Trapezius intact bilaterally.    Motor:   Tone: normal.                  Strength: intact throughout  Pronator drift: none                 Dysmeria: None to finger-nose-finger or heel-shin-heel  No truncal ataxia.    Tremor: No resting, postural or action tremor.  No myoclonus.    Sensation: intact to light touch, pinprick, vibration and proprioception    Deep Tendon Reflexes: 1+ bilateral biceps, triceps, brachioradialis, knee and ankle  Toes flexor bilaterally    Gait: normal and stable.      Other:    07-17    144  |  105  |  13  ----------------------------<  119<H>  3.8   |  28  |  0.39<L>    Ca    8.4      17 Jul 2017 03:16  Phos  3.1     07-17  Mg     1.9     07-17    TPro  6.1  /  Alb  3.1<L>  /  TBili  0.3  /  DBili  x   /  AST  23  /  ALT  6<L>  /  AlkPhos  66  07-17 07-17    144  |  105  |  13  ----------------------------<  119<H>  3.8   |  28  |  0.39<L>    Ca    8.4      17 Jul 2017 03:16  Phos  3.1     07-17  Mg     1.9     07-17    TPro  6.1  /  Alb  3.1<L>  /  TBili  0.3  /  DBili  x   /  AST  23  /  ALT  6<L>  /  AlkPhos  66  07-17    LIVER FUNCTIONS - ( 17 Jul 2017 03:16 )  Alb: 3.1 g/dL / Pro: 6.1 g/dL / ALK PHOS: 66 U/L / ALT: 6 U/L RC / AST: 23 U/L / GGT: x             EEG:EEG Summary/Classification:  -Abnormal prolonged video EEG due to:  1)  Breach Artifact over the right frontal region  2)  Diffuse Beta Activity     3)  Moderate-Severe Background Slowing Neurology follow up note    Name  DAVEY ROSENBAUM    Subjective: sedatives are being weaned  The patient is visited on the bedside, she follows simple commands and moves all her limbs L>R.   EEG did not show any defined epileptiform activity.    Review of Systems:  Negative except above    MEDICATIONS  (STANDING):  versed down to 2mg/hr this am    carbidopa/levodopa  25/100 2 Tablet(s) Oral every 4 hours  heparin  Injectable 5000 Unit(s) SubCutaneous every 8 hours  levothyroxine 75 MICROGram(s) Oral daily  pantoprazole   Suspension 40 milliGRAM(s) Oral before breakfast  fosphenytoin IVPB 100 milliGRAM(s) PE IV Intermittent every 8 hours  sodium chloride 0.65% Nasal 1 Spray(s) Both Nostrils three times a day  BACItracin   Ointment 1 Application(s) Topical two times a day  midazolam Infusion 2 mG/Hr (2 mL/Hr) IV Continuous <Continuous>  chlorhexidine 0.12% Liquid 15 milliLiter(s) Swish and Spit two times a day    MEDICATIONS  (PRN):      Allergies    sulfa drugs (Unknown)    Objective:   Vital Signs Last 24 Hrs  T(C): 37.1 (17 Jul 2017 07:00), Max: 37.3 (17 Jul 2017 00:00)  T(F): 98.7 (17 Jul 2017 07:00), Max: 99.1 (17 Jul 2017 00:00)  HR: 59 (17 Jul 2017 10:00) (53 - 141)  BP: 98/53 (17 Jul 2017 10:00) (98/53 - 153/83)  BP(mean): 71 (17 Jul 2017 10:00) (71 - 111)  RR: 12 (17 Jul 2017 10:00) (12 - 20)  SpO2: 100% (17 Jul 2017 10:00) (87% - 100%)    General Exam:   General appearance: No acute distress                 Cardiovascular: Pedal dorsalis pulses intact bilaterally    Neurological Exam:  Mental Status: intubated, follows commands, nods head appropriately  Cranial Nerves: CN I - not tested.  PERRL, EOMI, VFF, no nystagmus or diplopia.  No APD.  Fundi not visualized bilaterally.  CN V1-3 intact to light touch and pinprick.  No facial asymmetry.  Hearing intact to finger rub bilaterally.  Sternocleidomastoid and Trapezius intact bilaterally.    Motor:   Tone: increased in legs, extensor  Strength: decreased bulk in LE's bilat. unable to dorsiflex  Pronator drift: none                 Dysmetria: None to finger-nose-finger   No truncal ataxia.    Tremor: No resting, postural or action tremor.  No myoclonus.    Sensation: intact to light touch, pinprick, vibration and proprioception    Deep Tendon Reflexes: 1+ bilateral biceps, triceps, brachioradialis, 2+ knee and ankle unable to be tested    Other:  07-17    TPro  6.1  /  Alb  3.1<L>  /  TBili  0.3  /  DBili  x   /  AST  23  /  ALT  6<L>  /  AlkPhos  66  07-17 07-17    144  |  105  |  13  ----------------------------<  119<H>  3.8   |  28  |  0.39<L>    Ca    8.4      17 Jul 2017 03:16  Phos  3.1     07-17  Mg     1.9     07-17    TPro  6.1  /  Alb  3.1<L>  /  TBili  0.3  /  DBili  x   /  AST  23  /  ALT  6<L>  /  AlkPhos  66  07-17    LIVER FUNCTIONS - ( 17 Jul 2017 03:16 )  Alb: 3.1 g/dL / Pro: 6.1 g/dL / ALK PHOS: 66 U/L / ALT: 6 U/L RC / AST: 23 U/L / GGT: x             EEG:EEG Summary/Classification:  -Abnormal prolonged video EEG due to:  1)  Breach Artifact over the right frontal region  2)  Diffuse Beta Activity     3)  Moderate-Severe Background Slowing

## 2017-07-17 NOTE — EEG REPORT - NS EEG TEXT BOX
Study Date: 2017    Study Information:    EEG Recording Technique:  The patient underwent continuous Video-EEG monitoring, using Telemetry System hardware on the XLTek Digital System. EEG and video data were stored on a computer hard drive with important events saved in digital archive files. The material was reviewed by a physician (electroencephalographer / epileptologist) on a daily basis. Clif and seizure detection algorithms were utilized and reviewed. An EEG Technician attended to the patient, and was available throughout daytime work hours.  The epilepsy center neurologist was available in person or on call 24-hours per day.  EEG Placement and Labeling of Electrodes:  The EEG was performed utilizing 20 channel referential EEG connections (coronal over temporal over parasagittal montage) using all standard 10-20 electrode placements with EKG, with additional electrodes placed in the inferior temporal region using the modified 10-10 montage electrode placements for elective admissions, or if deemed necessary. Recording was at a sampling rate of 256 samples per second per channel. Time synchronized digital video recording was done simultaneously with EEG recording. A low light infrared camera was used for low light recording.   CSA Technical Component:  Quantitative EEG analysis using a separate Compressed Spectral Array (CSA) software package was conducted in real-time and run at bedside after set up by the technician, digitally displaying the power of electrographic frequencies included in the 1-30Hz band using a graded color map. This data was reviewed and interpreted independently, and is reported in a separate section below.    History:  -R/O Seizure     Medication	  No Data.	    Interpretation:    Startin2017    FINDINGS:   Awake Background:  -The background was continuous, disorganized, non- variable, and poorly reactive. There was no clear posterior dominant rhythm present. The background consisted of diffuse irregular beta activity with intermittent irregular delta activity intermixed diffusely. There was an accentuation of the fast rhythm amplitude over the right frontal region.       Sleep Background:  -Sleep architecture could not be visualized.    Interictal Epileptiform Activity:   -No epileptiform discharges were present.    Events:  -No clinical events or seizures were recorded during this study.    Activation Procedures:   -No activation procedures were performed during this study.    Artifacts:  Intermittent myogenic and movement artifact noted.     Heart rate:  Regular predominantly between 72-80 BPM.    Daily Compressed Spectral Array Digital Analysis    FINDINGS: Compressed Spectral Array (CSA) data was reviewed and correlated with the electroencephalographic findings detailed above. CSA showed a variable spectral pattern. Areas of increased power in particular were reviewed in detail, and compared with the raw EEG data. Areas of abrupt increases in spectral power were reviewed to exclude seizures, and were determined to be artifactual in nature.     The relative ratio of the power of delta range frequencies and faster frequencies remained stable over the course of the study. There was no definitive increase in the relative power in the delta frequency spectrum apparent in the left hemisphere versus the right hemisphere.     Compressed Spectral Array (Digital Analysis) Summary/ Impression:  No persistent hemispheric asymmetry. Intermittent areas of increased power reviewed, without definite epileptiform activity associated on CSA.     EEG Summary/Classification:  -Abnormal prolonged video EEG due to:  1)  Breach Artifact over the right frontal region  2)  Diffuse Beta Activity   2)  Moderate-Severe Background Slowing    EEG Impression/Clinical Correlate:  -Findings indicate nonspecific diffuse or multifocal cerebral dysfunction. Prominent beta activity may be a normal variant or may be seen in the setting of sedative medication use. 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       Cora Rios DO  Fellow in Neurophysiology/Epilepsy, [facility] [place]  	  Minh Maria M.D.			   Attending Physician, [facility] [place] Study Date: 2017    Study Information:    EEG Recording Technique:  The patient underwent continuous Video-EEG monitoring, using Telemetry System hardware on the XLTek Digital System. EEG and video data were stored on a computer hard drive with important events saved in digital archive files. The material was reviewed by a physician (electroencephalographer / epileptologist) on a daily basis. Clif and seizure detection algorithms were utilized and reviewed. An EEG Technician attended to the patient, and was available throughout daytime work hours.  The epilepsy center neurologist was available in person or on call 24-hours per day.  EEG Placement and Labeling of Electrodes:  The EEG was performed utilizing 20 channel referential EEG connections (coronal over temporal over parasagittal montage) using all standard 10-20 electrode placements with EKG, with additional electrodes placed in the inferior temporal region using the modified 10-10 montage electrode placements for elective admissions, or if deemed necessary. Recording was at a sampling rate of 256 samples per second per channel. Time synchronized digital video recording was done simultaneously with EEG recording. A low light infrared camera was used for low light recording.   CSA Technical Component:  Quantitative EEG analysis using a separate Compressed Spectral Array (CSA) software package was conducted in real-time and run at bedside after set up by the technician, digitally displaying the power of electrographic frequencies included in the 1-30Hz band using a graded color map. This data was reviewed and interpreted independently, and is reported in a separate section below.    History:  -R/O Seizure     Medication	  No Data.	    Interpretation:    Startin2017    FINDINGS:   Awake Background:  -The background was continuous, disorganized, non- variable, and poorly reactive. There was no clear posterior dominant rhythm present. The background consisted of diffuse irregular beta activity with intermittent irregular delta activity intermixed diffusely. There was an accentuation of the fast rhythm amplitude over the right frontal region.       Sleep Background:  -Sleep architecture could not be visualized.    Interictal Epileptiform Activity:   -No epileptiform discharges were present.    Events:  -No clinical events or seizures were recorded during this study.    Activation Procedures:   -No activation procedures were performed during this study.    Artifacts:  Intermittent myogenic and movement artifact noted.     Heart rate:  Regular predominantly between 72-80 BPM.    Daily Compressed Spectral Array Digital Analysis    FINDINGS: Compressed Spectral Array (CSA) data was reviewed and correlated with the electroencephalographic findings detailed above. CSA showed a variable spectral pattern. Areas of increased power in particular were reviewed in detail, and compared with the raw EEG data. Areas of abrupt increases in spectral power were reviewed to exclude seizures, and were determined to be artifactual in nature.     The relative ratio of the power of delta range frequencies and faster frequencies remained stable over the course of the study. There was no definitive increase in the relative power in the delta frequency spectrum apparent in the left hemisphere versus the right hemisphere.     Compressed Spectral Array (Digital Analysis) Summary/ Impression:  No persistent hemispheric asymmetry. Intermittent areas of increased power reviewed, without definite epileptiform activity associated on CSA.     EEG Summary/Classification:  -Abnormal prolonged video EEG due to:  1)  Breach Artifact over the right frontal region  2)  Diffuse Beta Activity   2)  Moderate-Severe Background Slowing    EEG Impression/Clinical Correlate:  -Findings indicate nonspecific diffuse or multifocal cerebral dysfunction. Prominent beta activity may be a normal variant or may be seen in the setting of sedative medication use. 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       Cora Rios DO  Fellow in Neurophysiology/Epilepsy, [facility] [place]  	  Minh Maria M.D.			   Attending Physician, [facility] [place]

## 2017-07-17 NOTE — PROGRESS NOTE ADULT - SUBJECTIVE AND OBJECTIVE BOX
POD/STATUS POST/ENT ISSUE: s/p Nasal packing placement by ED for control of bleeding    INTERVAL HPI: Pt Rapid Rhino removed yesterday, no bleeding from nose or mouth per MICU staff/RN      Vital Signs Last 24 Hrs  T(C): 36.6 (17 Jul 2017 11:00), Max: 37.3 (17 Jul 2017 00:00)  T(F): 97.8 (17 Jul 2017 11:00), Max: 99.1 (17 Jul 2017 00:00)  HR: 69 (17 Jul 2017 12:00) (53 - 141)  BP: 121/75 (17 Jul 2017 12:00) (98/53 - 153/83)  BP(mean): 93 (17 Jul 2017 12:00) (71 - 111)  RR: 12 (17 Jul 2017 12:00) (12 - 20)  SpO2: 100% (17 Jul 2017 12:00) (87% - 100%)    LABS:                        10.5   7.5   )-----------( 135      ( 17 Jul 2017 03:16 )             31.2       PHYSICAL EXAM:  Gen: NAD, well-developed  Head: Normocephalic, Atraumatic  Face: no edema/erythema/fluctuance, parotid glands soft without mass  Eyes: PERRL, EOMI, no scleral injection  Nose: Nares bilaterally patent, no discharge no bleeding b/l   Mouth: No Stridor / Drooling / Trismus.  Mucosa moist, tongue/uvula midline, oropharynx clear  Neck: Flat, supple, no lymphadenopathy, trachea midline, no masses  Resp: breathing easily, no stridor  CV: no peripheral edema/cyanosis POD/STATUS POST/ENT ISSUE: s/p Nasal packing placement by ED for control of bleeding    INTERVAL HPI: Pt Rapid Rhino removed yesterday, no bleeding from nose or mouth per MICU staff/RN      Vital Signs Last 24 Hrs  T(C): 36.6 (17 Jul 2017 11:00), Max: 37.3 (17 Jul 2017 00:00)  T(F): 97.8 (17 Jul 2017 11:00), Max: 99.1 (17 Jul 2017 00:00)  HR: 69 (17 Jul 2017 12:00) (53 - 141)  BP: 121/75 (17 Jul 2017 12:00) (98/53 - 153/83)  BP(mean): 93 (17 Jul 2017 12:00) (71 - 111)  RR: 12 (17 Jul 2017 12:00) (12 - 20)  SpO2: 100% (17 Jul 2017 12:00) (87% - 100%)    LABS:                        10.5   7.5   )-----------( 135      ( 17 Jul 2017 03:16 )             31.2       PHYSICAL EXAM:  Gen: NAD, well-developed  Head: Normocephalic, Atraumatic  Face: no edema/erythema/fluctuance, parotid glands soft without mass  Eyes: PERRL, EOMI, no scleral injection  Nose: Nares bilaterally patent, no discharge no bleeding b/l   Mouth: No Stridor / Drooling / Trismus.  Mucosa moist, tongue/uvula midline, oropharynx clear  Neck: Flat, supple, no lymphadenopathy, trachea midline, no masses  Resp: ventilating well on vent  CV: no peripheral edema/cyanosis

## 2017-07-17 NOTE — PROGRESS NOTE ADULT - ASSESSMENT
62yoF with Right Epistaxis, controlled with nasal packing s/p Intubation for Airway protection. s/p Nasal packing removal yesterday

## 2017-07-17 NOTE — PROGRESS NOTE ADULT - PROBLEM SELECTOR PLAN 1
- C/W carbidopa/levodopa  25/100 2 Tablet(s) Oral every 4 hours  - C/W fosphenytoin IVPB 100 milliGRAM(s) PE IV Intermittent every 8 hours  - Please continue to taper down midazolam Infusion

## 2017-07-17 NOTE — PROGRESS NOTE ADULT - ASSESSMENT
62-y F with cognitive impairment, TBI c/b ICH, Drug-induced Parkinson's Disease (neuroleptic use), nonverbal at baseline, s/p PEG placement for aspiration, Depression, Schizophrenia, Hypothyroidism, GERD, BIBEMS for evaluation of epistaxis. Patient had intubation. She had episodes of desaturation and hypotension ( SBP ranging to 30's as per MICU progression note). She had eye deviation and intermittent stiffening activity. PE consisted with increased tone, intermittent stiffening and eye deviation noted along with her baseline rhythmic movement of the hands. Short 20 minutes EEG was ordered by MICU team as a part of evaluation. EEG was performed without any significant epileptiform activity. 62-y F with cognitive impairment, TBI c/b ICH, Drug-induced Parkinson's Disease (neuroleptic use), nonverbal at baseline, s/p PEG placement for aspiration, Depression, Schizophrenia, Hypothyroidism, GERD, BIBEMS for evaluation of epistaxis. Patient had intubation for  episodes of desaturation, hypotension, ?sz.  On PHT now, and tapering off versed.

## 2017-07-17 NOTE — PROGRESS NOTE ADULT - ATTENDING COMMENTS
Agree with above.  TBI with epistaxis / intubated for airway.  Noted to have seizures on EEG started on Versed / fosphenytoin.  Unclear if recurrence overnight but likely Parkinsonian symptoms. On carbidopa/levo  Remains intubated AC-16/450/30/+5  Titrate Versed off and monitor for seizures.  Epistaxis appears to have resolved.  Daily SAT / SBT once Versed off    Critical care time: 35 minutes Agree with above.  TBI with epistaxis / intubated for airway.  Noted to have seizures on EEG started on Versed / fosphenytoin.  Unclear if recurrence overnight but likely Parkinsonian symptoms. On carbidopa/levo  Remains intubated AC-16/450/30/+5  Titrate Versed off and monitor for seizures. EEG: no seizure activity  Epistaxis appears to have resolved.  Daily SAT / SBT once Versed off    Critical care time: 35 minutes

## 2017-07-17 NOTE — PROGRESS NOTE ADULT - ATTENDING COMMENTS
Patient seen and examined. Agree with above. Recommend saline spray and baci ointment to nares BID. If patient needs nasal O2 make sure it is humidified. Please call if any further bleeding.

## 2017-07-18 DIAGNOSIS — R56.9 UNSPECIFIED CONVULSIONS: ICD-10-CM

## 2017-07-18 DIAGNOSIS — R25.1 TREMOR, UNSPECIFIED: ICD-10-CM

## 2017-07-18 LAB
ALBUMIN SERPL ELPH-MCNC: 3 G/DL — LOW (ref 3.3–5)
ALP SERPL-CCNC: 66 U/L — SIGNIFICANT CHANGE UP (ref 40–120)
ALT FLD-CCNC: 5 U/L RC — LOW (ref 10–45)
ANION GAP SERPL CALC-SCNC: 11 MMOL/L — SIGNIFICANT CHANGE UP (ref 5–17)
APTT BLD: 35.9 SEC — SIGNIFICANT CHANGE UP (ref 27.5–37.4)
AST SERPL-CCNC: 18 U/L — SIGNIFICANT CHANGE UP (ref 10–40)
BASOPHILS # BLD AUTO: 0 K/UL — SIGNIFICANT CHANGE UP (ref 0–0.2)
BASOPHILS NFR BLD AUTO: 0.6 % — SIGNIFICANT CHANGE UP (ref 0–2)
BILIRUB SERPL-MCNC: 0.2 MG/DL — SIGNIFICANT CHANGE UP (ref 0.2–1.2)
BUN SERPL-MCNC: 15 MG/DL — SIGNIFICANT CHANGE UP (ref 7–23)
CALCIUM SERPL-MCNC: 8.5 MG/DL — SIGNIFICANT CHANGE UP (ref 8.4–10.5)
CHLORIDE SERPL-SCNC: 104 MMOL/L — SIGNIFICANT CHANGE UP (ref 96–108)
CO2 SERPL-SCNC: 29 MMOL/L — SIGNIFICANT CHANGE UP (ref 22–31)
CREAT SERPL-MCNC: 0.42 MG/DL — LOW (ref 0.5–1.3)
EOSINOPHIL # BLD AUTO: 0.2 K/UL — SIGNIFICANT CHANGE UP (ref 0–0.5)
EOSINOPHIL NFR BLD AUTO: 2.7 % — SIGNIFICANT CHANGE UP (ref 0–6)
GLUCOSE SERPL-MCNC: 118 MG/DL — HIGH (ref 70–99)
HCT VFR BLD CALC: 29.4 % — LOW (ref 34.5–45)
HGB BLD-MCNC: 9.6 G/DL — LOW (ref 11.5–15.5)
INR BLD: 1.01 RATIO — SIGNIFICANT CHANGE UP (ref 0.88–1.16)
LYMPHOCYTES # BLD AUTO: 0.8 K/UL — LOW (ref 1–3.3)
LYMPHOCYTES # BLD AUTO: 11.4 % — LOW (ref 13–44)
MAGNESIUM SERPL-MCNC: 2 MG/DL — SIGNIFICANT CHANGE UP (ref 1.6–2.6)
MCHC RBC-ENTMCNC: 32.5 GM/DL — SIGNIFICANT CHANGE UP (ref 32–36)
MCHC RBC-ENTMCNC: 32.8 PG — SIGNIFICANT CHANGE UP (ref 27–34)
MCV RBC AUTO: 101 FL — HIGH (ref 80–100)
MONOCYTES # BLD AUTO: 0.5 K/UL — SIGNIFICANT CHANGE UP (ref 0–0.9)
MONOCYTES NFR BLD AUTO: 7.7 % — SIGNIFICANT CHANGE UP (ref 2–14)
NEUTROPHILS # BLD AUTO: 5.4 K/UL — SIGNIFICANT CHANGE UP (ref 1.8–7.4)
NEUTROPHILS NFR BLD AUTO: 77.7 % — HIGH (ref 43–77)
PHOSPHATE SERPL-MCNC: 3.3 MG/DL — SIGNIFICANT CHANGE UP (ref 2.5–4.5)
PLATELET # BLD AUTO: 160 K/UL — SIGNIFICANT CHANGE UP (ref 150–400)
POTASSIUM SERPL-MCNC: 4.2 MMOL/L — SIGNIFICANT CHANGE UP (ref 3.5–5.3)
POTASSIUM SERPL-SCNC: 4.2 MMOL/L — SIGNIFICANT CHANGE UP (ref 3.5–5.3)
PROT SERPL-MCNC: 5.9 G/DL — LOW (ref 6–8.3)
PROTHROM AB SERPL-ACNC: 10.9 SEC — SIGNIFICANT CHANGE UP (ref 9.8–12.7)
RBC # BLD: 2.91 M/UL — LOW (ref 3.8–5.2)
RBC # FLD: 13.3 % — SIGNIFICANT CHANGE UP (ref 10.3–14.5)
SODIUM SERPL-SCNC: 144 MMOL/L — SIGNIFICANT CHANGE UP (ref 135–145)
WBC # BLD: 7 K/UL — SIGNIFICANT CHANGE UP (ref 3.8–10.5)
WBC # FLD AUTO: 7 K/UL — SIGNIFICANT CHANGE UP (ref 3.8–10.5)

## 2017-07-18 PROCEDURE — 95819 EEG AWAKE AND ASLEEP: CPT | Mod: 26

## 2017-07-18 PROCEDURE — 93010 ELECTROCARDIOGRAM REPORT: CPT

## 2017-07-18 PROCEDURE — 95957 EEG DIGITAL ANALYSIS: CPT | Mod: 26

## 2017-07-18 PROCEDURE — 99232 SBSQ HOSP IP/OBS MODERATE 35: CPT

## 2017-07-18 PROCEDURE — 99291 CRITICAL CARE FIRST HOUR: CPT

## 2017-07-18 RX ORDER — FENTANYL CITRATE 50 UG/ML
25 INJECTION INTRAVENOUS
Qty: 0 | Refills: 0 | Status: DISCONTINUED | OUTPATIENT
Start: 2017-07-18 | End: 2017-07-21

## 2017-07-18 RX ADMIN — PANTOPRAZOLE SODIUM 40 MILLIGRAM(S): 20 TABLET, DELAYED RELEASE ORAL at 06:04

## 2017-07-18 RX ADMIN — HEPARIN SODIUM 5000 UNIT(S): 5000 INJECTION INTRAVENOUS; SUBCUTANEOUS at 13:16

## 2017-07-18 RX ADMIN — Medication 1 APPLICATION(S): at 05:11

## 2017-07-18 RX ADMIN — CARBIDOPA AND LEVODOPA 2 TABLET(S): 25; 100 TABLET ORAL at 15:54

## 2017-07-18 RX ADMIN — DEXMEDETOMIDINE HYDROCHLORIDE IN 0.9% SODIUM CHLORIDE 3.64 MICROGRAM(S)/KG/HR: 4 INJECTION INTRAVENOUS at 23:15

## 2017-07-18 RX ADMIN — Medication 1 SPRAY(S): at 05:11

## 2017-07-18 RX ADMIN — CARBIDOPA AND LEVODOPA 2 TABLET(S): 25; 100 TABLET ORAL at 08:35

## 2017-07-18 RX ADMIN — CARBIDOPA AND LEVODOPA 2 TABLET(S): 25; 100 TABLET ORAL at 19:28

## 2017-07-18 RX ADMIN — CHLORHEXIDINE GLUCONATE 15 MILLILITER(S): 213 SOLUTION TOPICAL at 05:11

## 2017-07-18 RX ADMIN — CHLORHEXIDINE GLUCONATE 15 MILLILITER(S): 213 SOLUTION TOPICAL at 17:29

## 2017-07-18 RX ADMIN — CARBIDOPA AND LEVODOPA 2 TABLET(S): 25; 100 TABLET ORAL at 23:22

## 2017-07-18 RX ADMIN — Medication 1 MILLIGRAM(S): at 04:07

## 2017-07-18 RX ADMIN — Medication 75 MICROGRAM(S): at 05:11

## 2017-07-18 RX ADMIN — FOSPHENYTOIN 104 MILLIGRAM(S) PE: 50 INJECTION INTRAMUSCULAR; INTRAVENOUS at 23:14

## 2017-07-18 RX ADMIN — CARBIDOPA AND LEVODOPA 2 TABLET(S): 25; 100 TABLET ORAL at 03:44

## 2017-07-18 RX ADMIN — Medication 1 SPRAY(S): at 23:15

## 2017-07-18 RX ADMIN — HEPARIN SODIUM 5000 UNIT(S): 5000 INJECTION INTRAVENOUS; SUBCUTANEOUS at 23:14

## 2017-07-18 RX ADMIN — HEPARIN SODIUM 5000 UNIT(S): 5000 INJECTION INTRAVENOUS; SUBCUTANEOUS at 05:11

## 2017-07-18 RX ADMIN — FOSPHENYTOIN 104 MILLIGRAM(S) PE: 50 INJECTION INTRAMUSCULAR; INTRAVENOUS at 12:58

## 2017-07-18 RX ADMIN — CARBIDOPA AND LEVODOPA 2 TABLET(S): 25; 100 TABLET ORAL at 11:51

## 2017-07-18 RX ADMIN — FOSPHENYTOIN 104 MILLIGRAM(S) PE: 50 INJECTION INTRAMUSCULAR; INTRAVENOUS at 05:11

## 2017-07-18 RX ADMIN — Medication 1 SPRAY(S): at 13:16

## 2017-07-18 RX ADMIN — Medication 1 MILLIGRAM(S): at 09:16

## 2017-07-18 RX ADMIN — Medication 1 APPLICATION(S): at 17:29

## 2017-07-18 NOTE — PROGRESS NOTE ADULT - PROBLEM SELECTOR PLAN 2
continue levodopa/carbidopa. continue levodopa/carbidopa 25/100mg 2 pills q4hrs during wake state, ie 5x/day (baseline dose at home)_

## 2017-07-18 NOTE — PROGRESS NOTE ADULT - ATTENDING COMMENTS
Agree with above.  TBI with epistaxis intubated for airway.  Now concern for possible seizures, on fosphenytoin.  Neuro has reviewed EEG and notes that all episodes over the last day that were thought to be possible seizures are all not clinical seizures.  Will stop benzos and use fentanyl PRN agitation instead to confirm no seizures off benzos.  Keep Precedex on for sedation, continue carbidopa / levodopa.  If no seizures today, will do daily SAT / SBT until extubation.    Critical care time: 35 minutes

## 2017-07-18 NOTE — PROGRESS NOTE ADULT - ASSESSMENT
62 -year-old F with cognitive impairment, TBI c/b ICH, Drug-induced Parkinson's Disease (neuroleptic use) s/p PEG placement for aspiration, Depression, Schizophrenia, Hypothyroidism, GERD, evaluated for epistaxis, had respiratory distress in the setting of epistaxis and failure to protect airway, admitted to MICU. Patient's EEG reported no epileptiform discharges. Patient was started on carbidopa/levodopa. 62 -year-old F with cognitive impairment, TBI c/b ICH, Drug-induced Parkinson's Disease (neuroleptic use) s/p PEG placement for aspiration, Depression, Schizophrenia, Hypothyroidism, GERD, evaluated for epistaxis, had respiratory distress in the setting of epistaxis and failure to protect airway, admitted to MICU. Concern for seizures on admission, patient's VEEG currently with no epileptiform discharges p versed taper, on PHT.   Patient was restarted on carbidopa/levodopa due to substantial tremors, with improvement.

## 2017-07-18 NOTE — PROGRESS NOTE ADULT - SUBJECTIVE AND OBJECTIVE BOX
Subjective: interval History - No events overnight, patient had no seizure activity and epileptiform activity noted on EEG. Patient was started on carbidopa/levodopa. Her hands tremors improved.     Objective:   Vital Signs Last 24 Hrs  T(C): 36.6 (18 Jul 2017 11:00), Max: 37.3 (18 Jul 2017 04:00)  T(F): 97.9 (18 Jul 2017 11:00), Max: 99.1 (18 Jul 2017 04:00)  HR: 62 (18 Jul 2017 13:00) (59 - 128)  BP: 89/54 (18 Jul 2017 13:00) (87/52 - 148/72)  BP(mean): 67 (18 Jul 2017 13:00) (66 - 111)  RR: 12 (18 Jul 2017 13:00) (12 - 26)  SpO2: 99% (18 Jul 2017 13:00) (79% - 100%)    General Exam:     General appearance: No acute distress                   Neurological Exam:    Mental Status: alert, awake and following commands (showing two fingers)     Cranial Nerves: CN I -   PERRL, EOMI, VFF, no nystagmus or diplopia.    No facial asymmetry.      Motor:     Tone: normal.                    Strength: intact throughout    Pronator drift: deferred             Dysmeria: not examined     Tremor: No resting, postural or action tremor.  No myoclonus.    Sensation: intact in all four extremity     Deep Tendon Reflexes: 1+ bilateral biceps, triceps, brachioradialis, knee and ankle    Gait: deferred     Other:    07-18    144  |  104  |  15  ----------------------------<  118<H>  4.2   |  29  |  0.42<L>    Ca    8.5      18 Jul 2017 02:52  Phos  3.3     07-18  Mg     2.0     07-18    TPro  5.9<L>  /  Alb  3.0<L>  /  TBili  0.2  /  DBili  x   /  AST  18  /  ALT  5<L>  /  AlkPhos  66  07-18    LIVER FUNCTIONS - ( 18 Jul 2017 02:52 )  Alb: 3.0 g/dL / Pro: 5.9 g/dL / ALK PHOS: 66 U/L / ALT: 5 U/L RC / AST: 18 U/L / GGT: x                                 9.6    7.0   )-----------( 160      ( 18 Jul 2017 02:52 )             29.4     Radiology    07/17/2017    Interictal Epileptiform Activity:   -No epileptiform discharges were present.      MEDICATIONS  (STANDING):  carbidopa/levodopa  25/100 2 Tablet(s) Oral every 4 hours  heparin  Injectable 5000 Unit(s) SubCutaneous every 8 hours  levothyroxine 75 MICROGram(s) Oral daily  pantoprazole   Suspension 40 milliGRAM(s) Oral before breakfast  fosphenytoin IVPB 100 milliGRAM(s) PE IV Intermittent every 8 hours  sodium chloride 0.65% Nasal 1 Spray(s) Both Nostrils three times a day  BACItracin   Ointment 1 Application(s) Topical two times a day  chlorhexidine 0.12% Liquid 15 milliLiter(s) Swish and Spit two times a day  dexmedetomidine Infusion 0.3 MICROgram(s)/kG/Hr (3.637 mL/Hr) IV Continuous <Continuous>    MEDICATIONS  (PRN):  fentaNYL    Injectable 25 MICROGram(s) IV Push every 2 hours PRN Moderate Pain (4 - 6) Subjective: interval History - persistent prominent hand/head tremors near 4hz after versed taper noted.  Given sinemet  No events overnight, patient had no seizure activity and epileptiform activity noted on EEG. Patient was started on carbidopa/levodopa -> Her hands tremors improved.     Objective:   Vital Signs Last 24 Hrs  T(C): 36.6 (18 Jul 2017 11:00), Max: 37.3 (18 Jul 2017 04:00)  T(F): 97.9 (18 Jul 2017 11:00), Max: 99.1 (18 Jul 2017 04:00)  HR: 62 (18 Jul 2017 13:00) (59 - 128)  BP: 89/54 (18 Jul 2017 13:00) (87/52 - 148/72)  BP(mean): 67 (18 Jul 2017 13:00) (66 - 111)  RR: 12 (18 Jul 2017 13:00) (12 - 26)  SpO2: 99% (18 Jul 2017 13:00) (79% - 100%)    General Exam:     General appearance: No acute distress                 Neurological Exam:  Mental Status: alert, awake and following commands (showing two fingers)   Cranial Nerves: CN I -   PERRL, EOMI, VFF, no nystagmus or diplopia.    No facial asymmetry.    Motor:   Tone: increased, drew LE's extensor  Strength: intact throughout  Pronator drift: deferred   Dysmetria: not examined   Tremor: Mild resting tremors, no postural or action tremor.  No myoclonus.  Sensation: intact in all four extremity   Deep Tendon Reflexes: 1+ bilateral biceps, triceps, brachioradialis, knee and ankle    Gait: deferred     Other:    07-18    144  |  104  |  15  ----------------------------<  118<H>  4.2   |  29  |  0.42<L>    Ca    8.5      18 Jul 2017 02:52  Phos  3.3     07-18  Mg     2.0     07-18    TPro  5.9<L>  /  Alb  3.0<L>  /  TBili  0.2  /  DBili  x   /  AST  18  /  ALT  5<L>  /  AlkPhos  66  07-18    LIVER FUNCTIONS - ( 18 Jul 2017 02:52 )  Alb: 3.0 g/dL / Pro: 5.9 g/dL / ALK PHOS: 66 U/L / ALT: 5 U/L RC / AST: 18 U/L / GGT: x                                 9.6    7.0   )-----------( 160      ( 18 Jul 2017 02:52 )             29.4     Radiology    07/17/2017    Interictal Epileptiform Activity:   -No epileptiform discharges were present.  R frontoparietal breach      MEDICATIONS  (STANDING):  carbidopa/levodopa  25/100 2 Tablet(s) Oral every 4 hours  heparin  Injectable 5000 Unit(s) SubCutaneous every 8 hours  levothyroxine 75 MICROGram(s) Oral daily  pantoprazole   Suspension 40 milliGRAM(s) Oral before breakfast  fosphenytoin IVPB 100 milliGRAM(s) PE IV Intermittent every 8 hours  sodium chloride 0.65% Nasal 1 Spray(s) Both Nostrils three times a day  BACItracin   Ointment 1 Application(s) Topical two times a day  chlorhexidine 0.12% Liquid 15 milliLiter(s) Swish and Spit two times a day  dexmedetomidine Infusion 0.3 MICROgram(s)/kG/Hr (3.637 mL/Hr) IV Continuous <Continuous>    MEDICATIONS  (PRN):  fentaNYL    Injectable 25 MICROGram(s) IV Push every 2 hours PRN Moderate Pain (4 - 6)

## 2017-07-18 NOTE — EEG REPORT - NS EEG TEXT BOX
Study Date: -2017    Study Information:    EEG Recording Technique:  The patient underwent continuous Video-EEG monitoring, using Telemetry System hardware on the [employer] Digital System. EEG and video data were stored on a computer hard drive with important events saved in digital archive files. The material was reviewed by a physician (electroencephalographer / epileptologist) on a daily basis. Clif and seizure detection algorithms were utilized and reviewed. An EEG Technician attended to the patient, and was available throughout daytime work hours.  The epilepsy center neurologist was available in person or on call 24-hours per day.  EEG Placement and Labeling of Electrodes:  The EEG was performed utilizing 20 channel referential EEG connections (coronal over temporal over parasagittal montage) using all standard 10-20 electrode placements with EKG, with additional electrodes placed in the inferior temporal region using the modified 10-10 montage electrode placements for elective admissions, or if deemed necessary. Recording was at a sampling rate of 256 samples per second per channel. Time synchronized digital video recording was done simultaneously with EEG recording. A low light infrared camera was used for low light recording.   CSA Technical Component:  Quantitative EEG analysis using a separate Compressed Spectral Array (CSA) software package was conducted in real-time and run at bedside after set up by the technician, digitally displaying the power of electrographic frequencies included in the 1-30Hz band using a graded color map. This data was reviewed and interpreted independently, and is reported in a separate section below.    History:  -R/O Seizure     Medication	  No Data.	    Interpretation:    Startin2017    FINDINGS:   Awake Background:  -The background was continuous, disorganized, non- variable, and reactive. There was no clear posterior dominant rhythm present. The background consisted of diffuse irregular beta activity with intermittent irregular delta activity intermixed diffusely. There was an accentuation of the fast rhythm amplitude over the right frontal region.       Sleep Background:  -Sleep architecture could not be visualized.    Interictal Epileptiform Activity:   -No epileptiform discharges were present.    Events:  Near-continuous left upper extremity tremor with tremor artifact seen on EEG.    Activation Procedures:   -No activation procedures were performed during this study.    Artifacts:  Intermittent myogenic and movement artifact noted.     Heart rate:  Regular predominantly between 72-80 BPM.    Daily Compressed Spectral Array Digital Analysis    FINDINGS: Compressed Spectral Array (CSA) data was reviewed and correlated with the electroencephalographic findings detailed above. CSA showed a variable spectral pattern. Areas of increased power in particular were reviewed in detail, and compared with the raw EEG data. Areas of abrupt increases in spectral power were reviewed to exclude seizures, and were determined to be artifactual in nature.     The relative ratio of the power of delta range frequencies and faster frequencies remained stable over the course of the study. There was no definitive increase in the relative power in the delta frequency spectrum apparent in the left hemisphere versus the right hemisphere.     Compressed Spectral Array (Digital Analysis) Summary/ Impression:  No persistent hemispheric asymmetry. Intermittent areas of increased power reviewed, without definite epileptiform activity associated on CSA.     EEG Summary/Classification:  -Abnormal prolonged video EEG due to:  1)  Breach Artifact over the right frontal region  2)  Diffuse Beta Activity   3)  Moderate Background Slowing  4) Left upper extremity tremor with tremor artifact on EEG    EEG Impression/Clinical Correlate:  -Findings indicate nonspecific diffuse or multifocal cerebral dysfunction. Prominent beta activity may be a normal variant or may be seen in the setting of sedative medication use. 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  Left upper limb tremor not epileptic in nature.      Cora Rios DO  Fellow in Neurophysiology/Epilepsy, [facility] [place]  	  Minh Maria M.D.			   Attending Physician, [facility] [place]

## 2017-07-18 NOTE — PROGRESS NOTE ADULT - ASSESSMENT
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  Plan: Patient with long history of PD from neuroleptic use; patient severe rigidity, on high-dose sinemet, with recurrent aspiration with prior hospitalization.  - continue sinemet  - non-oral nutrition with tube feeds  - goals of care discussion in the past with family (patient refused hospice placement in the past). Mother of the patient has been making medical decisions but has recently suffered a stroke.  -Pt. 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 Tapered off versed monitor therapeutic dilantin level and monitor EEG does not show underlying seizures.   -Neurology following, recommendations appreciated.    #Respiratory  Acute respiratory failure with hypoxia and hypercapnia.  Plan: - Likely 2/2 Epistaxis, no PNA visualized on CXR.  US showed A-line predominant   - - wean FiO2, keep Sat >92%  - frequent suctioning  - ENT evaluation for epistaxis- now resolved  - Protonix while on vent.     #Cardiac  Troponin trending down, No acute concerns on EKG    #Heme  Epistaxis, recurrent.  Plan: - CBC stable. Monitor CBC daily  -Platelets stable 135  :s/p Nasal packing removal  off ABX for packing prophylaxis  continue nasal saline b/l 3x/day, Bacitracin Ointment B/L 2x/day  Monitor for any signs of bleeding  monitor h/h  - Airway protected with ET Tube.    #Endo  Hypothyroidism.  Plan: Continue synthroid 75 mcg/daily  Check TSH level.     #Prophylactic  Plan: HSQ. 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 Mother of the patient has been making medical decisions but has recently suffered a stroke.  -Pt. 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 Tapered off versed monitor therapeutic dilantin level and monitor EEG does not show underlying seizures.   -Neurology following, recommendations appreciated.    #Respiratory  Acute respiratory failure with hypoxia and hypercapnia.  Plan: - Likely 2/2 Epistaxis, no PNA visualized on CXR.  US showed A-line predominant   - - wean FiO2, keep Sat >92%  - frequent suctioning  - ENT evaluation for epistaxis- now resolved  - Protonix while on vent.     #Cardiac  Troponin trending down, No acute concerns on EKG    #Heme  Epistaxis, recurrent.  Plan: - CBC stable. Monitor CBC daily  -Platelets stable 135  :s/p Nasal packing removal  off ABX for packing prophylaxis  continue nasal saline b/l 3x/day, Bacitracin Ointment B/L 2x/day  Monitor for any signs of bleeding  monitor h/h  - Airway protected with ET Tube.    #Endo  Hypothyroidism.  Plan: Continue synthroid 75 mcg/daily  Check TSH level.     #Prophylactic  Plan: HSQ. 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 Avoid benzos   -GOC: patient refused hospice placement in the past. Mother of the patient has been making medical decisions but has recently suffered a stroke.  -Neurology following, recommendations appreciated.    #Respiratory  Acute respiratory failure with hypoxia and hypercapnia 2/2 Epistaxis  - wean FiO2, keep Sat >92%  - Airway compromise 2/2 epistaxis- now resolved  - Consider Extubation  - GI Prophylaxis: Protonix     #Cardiac  Troponin trending down, No acute concerns on EKG    #Heme  Epistaxis, recurrent.    - s/p Nasal packing removal, off ABX for packing prophylaxis  - Monitor CBC daily  -continue nasal saline b/l 3x/day, Bacitracin Ointment B/L 2x/day  - Airway protected with ET Tube    #Endo  Hypothyroidism    - Continue synthroid 75 mcg/daily  - Check TSH level.     #Prophylactic  Plan: HSQ.

## 2017-07-18 NOTE — PROGRESS NOTE ADULT - SUBJECTIVE AND OBJECTIVE BOX
CHIEF COMPLAINT:    Interval Events:    REVIEW OF SYSTEMS:  Constitutional: [ ] negative [ ] fevers [ ] chills [ ] weight loss [ ] weight gain  HEENT: [ ] negative [ ] dry eyes [ ] eye irritation [ ] postnasal drip [ ] nasal congestion  CV: [ ] negative  [ ] chest pain [ ] orthopnea [ ] palpitations [ ] murmur  Resp: [ ] negative [ ] cough [ ] shortness of breath [ ] dyspnea [ ] wheezing [ ] sputum [ ] hemoptysis  GI: [ ] negative [ ] nausea [ ] vomiting [ ] diarrhea [ ] constipation [ ] abd pain [ ] dysphagia   : [ ] negative [ ] dysuria [ ] nocturia [ ] hematuria [ ] increased urinary frequency  Musculoskeletal: [ ] negative [ ] back pain [ ] myalgias [ ] arthralgias [ ] fracture  Skin: [ ] negative [ ] rash [ ] itch  Neurological: [ ] negative [ ] headache [ ] dizziness [ ] syncope [ ] weakness [ ] numbness  Psychiatric: [ ] negative [ ] anxiety [ ] depression  Endocrine: [ ] negative [ ] diabetes [ ] thyroid problem  Hematologic/Lymphatic: [ ] negative [ ] anemia [ ] bleeding problem  Allergic/Immunologic: [ ] negative [ ] itchy eyes [ ] nasal discharge [ ] hives [ ] angioedema  [ ] All other systems negative  [ ] Unable to assess ROS because ________    OBJECTIVE:  ICU Vital Signs Last 24 Hrs  T(C): 36.6 (18 Jul 2017 07:00), Max: 37.3 (18 Jul 2017 04:00)  T(F): 97.8 (18 Jul 2017 07:00), Max: 99.1 (18 Jul 2017 04:00)  HR: 84 (18 Jul 2017 07:00) (59 - 128)  BP: 134/65 (18 Jul 2017 07:00) (87/52 - 148/72)  BP(mean): 93 (18 Jul 2017 07:00) (66 - 111)  ABP: --  ABP(mean): --  RR: 19 (18 Jul 2017 07:00) (12 - 25)  SpO2: 99% (18 Jul 2017 07:00) (96% - 100%)    Mode: AC/ CMV (Assist Control/ Continuous Mandatory Ventilation), RR (machine): 12, TV (machine): 400, FiO2: 30, PEEP: 5, ITime: 1, MAP: 10, PIP: 23    07-17 @ 07:01  -  07-18 @ 07:00  --------------------------------------------------------  IN: 1667.8 mL / OUT: 0 mL / NET: 1667.8 mL      CAPILLARY BLOOD GLUCOSE          PHYSICAL EXAM:  General:   HEENT:   Lymph Nodes:  Neck:   Respiratory:   Cardiovascular:   Abdomen:   Extremities:   Skin:   Neurological:  Psychiatry:    LINES:    HOSPITAL MEDICATIONS:  heparin  Injectable 5000 Unit(s) SubCutaneous every 8 hours        levothyroxine 75 MICROGram(s) Oral daily      carbidopa/levodopa  25/100 2 Tablet(s) Oral every 4 hours  fosphenytoin IVPB 100 milliGRAM(s) PE IV Intermittent every 8 hours  LORazepam   Injectable 1 milliGRAM(s) IV Push every 4 hours PRN  dexmedetomidine Infusion 0.3 MICROgram(s)/kG/Hr IV Continuous <Continuous>    pantoprazole   Suspension 40 milliGRAM(s) Oral before breakfast            sodium chloride 0.65% Nasal 1 Spray(s) Both Nostrils three times a day  BACItracin   Ointment 1 Application(s) Topical two times a day  chlorhexidine 0.12% Liquid 15 milliLiter(s) Swish and Spit two times a day            LABS:                        9.6    7.0   )-----------( 160      ( 18 Jul 2017 02:52 )             29.4     Hgb Trend: 9.6<--, 10.5<--, 10.2<--, 10.6<--, 10.4<--  07-18    144  |  104  |  15  ----------------------------<  118<H>  4.2   |  29  |  0.42<L>    Ca    8.5      18 Jul 2017 02:52  Phos  3.3     07-18  Mg     2.0     07-18    TPro  5.9<L>  /  Alb  3.0<L>  /  TBili  0.2  /  DBili  x   /  AST  18  /  ALT  5<L>  /  AlkPhos  66  07-18    Creatinine Trend: 0.42<--, 0.39<--, 0.45<--, 0.51<--, 0.58<--, 0.92<--  PT/INR - ( 18 Jul 2017 02:52 )   PT: 10.9 sec;   INR: 1.01 ratio         PTT - ( 18 Jul 2017 02:52 )  PTT:35.9 sec          MICROBIOLOGY:     RADIOLOGY:  [ ] Reviewed and interpreted by me    EKG: CHIEF COMPLAINT:    Interval Events: Overnight jerking movements with loss of occular tracking while on VEEG. Regained ability to track before Ativan was given. Received 4 Ativan overnight for agitation. On .3 Precedex, fosphenytoin    REVIEW OF SYSTEMS:  Constitutional: [ ] negative [ ] fevers [ ] chills [ ] weight loss [ ] weight gain  HEENT: [ ] negative [ ] dry eyes [ ] eye irritation [ ] postnasal drip [ ] nasal congestion  CV: [ ] negative  [ ] chest pain [ ] orthopnea [ ] palpitations [ ] murmur  Resp: [ ] negative [ ] cough [ ] shortness of breath [ ] dyspnea [ ] wheezing [ ] sputum [ ] hemoptysis  GI: [ ] negative [ ] nausea [ ] vomiting [ ] diarrhea [ ] constipation [ ] abd pain [ ] dysphagia   : [ ] negative [ ] dysuria [ ] nocturia [ ] hematuria [ ] increased urinary frequency  Musculoskeletal: [ ] negative [ ] back pain [ ] myalgias [ ] arthralgias [ ] fracture  Skin: [ ] negative [ ] rash [ ] itch  Neurological: [ ] negative [ ] headache [ ] dizziness [ ] syncope [ ] weakness [ ] numbness  Psychiatric: [ ] negative [ ] anxiety [ ] depression  Endocrine: [ ] negative [ ] diabetes [ ] thyroid problem  Hematologic/Lymphatic: [ ] negative [ ] anemia [ ] bleeding problem  Allergic/Immunologic: [ ] negative [ ] itchy eyes [ ] nasal discharge [ ] hives [ ] angioedema  [ ] All other systems negative  [x ] Unable to assess ROS because pt is intubated     OBJECTIVE:  ICU Vital Signs Last 24 Hrs  T(C): 36.6 (18 Jul 2017 07:00), Max: 37.3 (18 Jul 2017 04:00)  T(F): 97.8 (18 Jul 2017 07:00), Max: 99.1 (18 Jul 2017 04:00)  HR: 84 (18 Jul 2017 07:00) (59 - 128)  BP: 134/65 (18 Jul 2017 07:00) (87/52 - 148/72)  BP(mean): 93 (18 Jul 2017 07:00) (66 - 111)  ABP: --  ABP(mean): --  RR: 19 (18 Jul 2017 07:00) (12 - 25)  SpO2: 99% (18 Jul 2017 07:00) (96% - 100%)    Mode: AC/ CMV (Assist Control/ Continuous Mandatory Ventilation), RR (machine): 12, TV (machine): 400, FiO2: 30, PEEP: 5, ITime: 1, MAP: 10, PIP: 23    07-17 @ 07:01  -  07-18 @ 07:00  --------------------------------------------------------  IN: 1667.8 mL / OUT: 0 mL / NET: 1667.8 mL      CAPILLARY BLOOD GLUCOSE          PHYSICAL EXAM:  General:   HEENT:   Lymph Nodes:  Neck:   Respiratory:   Cardiovascular:   Abdomen:   Extremities:   Skin:   Neurological:  Psychiatry:    LINES:    HOSPITAL MEDICATIONS:  heparin  Injectable 5000 Unit(s) SubCutaneous every 8 hours        levothyroxine 75 MICROGram(s) Oral daily      carbidopa/levodopa  25/100 2 Tablet(s) Oral every 4 hours  fosphenytoin IVPB 100 milliGRAM(s) PE IV Intermittent every 8 hours  LORazepam   Injectable 1 milliGRAM(s) IV Push every 4 hours PRN  dexmedetomidine Infusion 0.3 MICROgram(s)/kG/Hr IV Continuous <Continuous>    pantoprazole   Suspension 40 milliGRAM(s) Oral before breakfast            sodium chloride 0.65% Nasal 1 Spray(s) Both Nostrils three times a day  BACItracin   Ointment 1 Application(s) Topical two times a day  chlorhexidine 0.12% Liquid 15 milliLiter(s) Swish and Spit two times a day            LABS:                        9.6    7.0   )-----------( 160      ( 18 Jul 2017 02:52 )             29.4     Hgb Trend: 9.6<--, 10.5<--, 10.2<--, 10.6<--, 10.4<--  07-18    144  |  104  |  15  ----------------------------<  118<H>  4.2   |  29  |  0.42<L>    Ca    8.5      18 Jul 2017 02:52  Phos  3.3     07-18  Mg     2.0     07-18    TPro  5.9<L>  /  Alb  3.0<L>  /  TBili  0.2  /  DBili  x   /  AST  18  /  ALT  5<L>  /  AlkPhos  66  07-18    Creatinine Trend: 0.42<--, 0.39<--, 0.45<--, 0.51<--, 0.58<--, 0.92<--  PT/INR - ( 18 Jul 2017 02:52 )   PT: 10.9 sec;   INR: 1.01 ratio         PTT - ( 18 Jul 2017 02:52 )  PTT:35.9 sec          MICROBIOLOGY:     RADIOLOGY:  [ ] Reviewed and interpreted by me    EKG: CHIEF COMPLAINT:    Interval Events: Overnight jerking movements with loss of occular tracking while on VEEG. Regained ability to track before Ativan was given. Received 4 Ativan overnight for agitation. On .3 Precedex, fosphenytoin, remains intubated    REVIEW OF SYSTEMS:  Constitutional: [ ] negative [ ] fevers [ ] chills [ ] weight loss [ ] weight gain  HEENT: [ ] negative [ ] dry eyes [ ] eye irritation [ ] postnasal drip [ ] nasal congestion  CV: [ ] negative  [ ] chest pain [ ] orthopnea [ ] palpitations [ ] murmur  Resp: [ ] negative [ ] cough [ ] shortness of breath [ ] dyspnea [ ] wheezing [ ] sputum [ ] hemoptysis  GI: [ ] negative [ ] nausea [ ] vomiting [ ] diarrhea [ ] constipation [ ] abd pain [ ] dysphagia   : [ ] negative [ ] dysuria [ ] nocturia [ ] hematuria [ ] increased urinary frequency  Musculoskeletal: [ ] negative [ ] back pain [ ] myalgias [ ] arthralgias [ ] fracture  Skin: [ ] negative [ ] rash [ ] itch  Neurological: [ ] negative [ ] headache [ ] dizziness [ ] syncope [ ] weakness [ ] numbness  Psychiatric: [ ] negative [ ] anxiety [ ] depression  Endocrine: [ ] negative [ ] diabetes [ ] thyroid problem  Hematologic/Lymphatic: [ ] negative [ ] anemia [ ] bleeding problem  Allergic/Immunologic: [ ] negative [ ] itchy eyes [ ] nasal discharge [ ] hives [ ] angioedema  [ ] All other systems negative  [x ] Unable to assess ROS because pt is intubated     OBJECTIVE:  ICU Vital Signs Last 24 Hrs  T(C): 36.6 (18 Jul 2017 07:00), Max: 37.3 (18 Jul 2017 04:00)  T(F): 97.8 (18 Jul 2017 07:00), Max: 99.1 (18 Jul 2017 04:00)  HR: 84 (18 Jul 2017 07:00) (59 - 128)  BP: 134/65 (18 Jul 2017 07:00) (87/52 - 148/72)  BP(mean): 93 (18 Jul 2017 07:00) (66 - 111)  ABP: --  ABP(mean): --  RR: 19 (18 Jul 2017 07:00) (12 - 25)  SpO2: 99% (18 Jul 2017 07:00) (96% - 100%)    Mode: AC/ CMV (Assist Control/ Continuous Mandatory Ventilation), RR (machine): 12, TV (machine): 400, FiO2: 30, PEEP: 5, ITime: 1, MAP: 10, PIP: 23    07-17 @ 07:01  -  07-18 @ 07:00  --------------------------------------------------------  IN: 1667.8 mL / OUT: 0 mL / NET: 1667.8 mL      CAPILLARY BLOOD GLUCOSE          PHYSICAL EXAM:  General: Calm, intubated in bed   HEENT: Eyes tracking. Intubated   Lymph Nodes:  Neck: No JVD  Respiratory: Clear lungs field b/l Some bronchial breath sounds   Cardiovascular: S1/S2 No murmurs  Abdomen: Firm, NT. ND  Extremities: Well perfused upper extremities. Cold lower extremity, no cyanosis however.  Skin:   Neurological: VEEG currently. Eyes tracking. Sedated with Precedex  Psychiatry:    LINES: [facility]    HOSPITAL MEDICATIONS:  heparin  Injectable 5000 Unit(s) SubCutaneous every 8 hours        levothyroxine 75 MICROGram(s) Oral daily      carbidopa/levodopa  25/100 2 Tablet(s) Oral every 4 hours  fosphenytoin IVPB 100 milliGRAM(s) PE IV Intermittent every 8 hours  LORazepam   Injectable 1 milliGRAM(s) IV Push every 4 hours PRN  dexmedetomidine Infusion 0.3 MICROgram(s)/kG/Hr IV Continuous <Continuous>    pantoprazole   Suspension 40 milliGRAM(s) Oral before breakfast            sodium chloride 0.65% Nasal 1 Spray(s) Both Nostrils three times a day  BACItracin   Ointment 1 Application(s) Topical two times a day  chlorhexidine 0.12% Liquid 15 milliLiter(s) Swish and Spit two times a day            LABS:                        9.6    7.0   )-----------( 160      ( 18 Jul 2017 02:52 )             29.4     Hgb Trend: 9.6<--, 10.5<--, 10.2<--, 10.6<--, 10.4<--  07-18    144  |  104  |  15  ----------------------------<  118<H>  4.2   |  29  |  0.42<L>    Ca    8.5      18 Jul 2017 02:52  Phos  3.3     07-18  Mg     2.0     07-18    TPro  5.9<L>  /  Alb  3.0<L>  /  TBili  0.2  /  DBili  x   /  AST  18  /  ALT  5<L>  /  AlkPhos  66  07-18    Creatinine Trend: 0.42<--, 0.39<--, 0.45<--, 0.51<--, 0.58<--, 0.92<--  PT/INR - ( 18 Jul 2017 02:52 )   PT: 10.9 sec;   INR: 1.01 ratio         PTT - ( 18 Jul 2017 02:52 )  PTT:35.9 sec          MICROBIOLOGY:     RADIOLOGY:  [ ] Reviewed and interpreted by me    EKG: CHIEF COMPLAINT:    Interval Events: Overnight jerking movements with loss of occular tracking while on VEEG. Regained ability to track before Ativan was given. Received 4 Ativan overnight for agitation. On .3 Precedex, fosphenytoin, remains intubated on VEEG.    REVIEW OF SYSTEMS:  Constitutional: [ ] negative [ ] fevers [ ] chills [ ] weight loss [ ] weight gain  HEENT: [ ] negative [ ] dry eyes [ ] eye irritation [ ] postnasal drip [ ] nasal congestion  CV: [ ] negative  [ ] chest pain [ ] orthopnea [ ] palpitations [ ] murmur  Resp: [ ] negative [ ] cough [ ] shortness of breath [ ] dyspnea [ ] wheezing [ ] sputum [ ] hemoptysis  GI: [ ] negative [ ] nausea [ ] vomiting [ ] diarrhea [ ] constipation [ ] abd pain [ ] dysphagia   : [ ] negative [ ] dysuria [ ] nocturia [ ] hematuria [ ] increased urinary frequency  Musculoskeletal: [ ] negative [ ] back pain [ ] myalgias [ ] arthralgias [ ] fracture  Skin: [ ] negative [ ] rash [ ] itch  Neurological: [ ] negative [ ] headache [ ] dizziness [ ] syncope [ ] weakness [ ] numbness  Psychiatric: [ ] negative [ ] anxiety [ ] depression  Endocrine: [ ] negative [ ] diabetes [ ] thyroid problem  Hematologic/Lymphatic: [ ] negative [ ] anemia [ ] bleeding problem  Allergic/Immunologic: [ ] negative [ ] itchy eyes [ ] nasal discharge [ ] hives [ ] angioedema  [ ] All other systems negative  [x ] Unable to assess ROS because pt is intubated     OBJECTIVE:  ICU Vital Signs Last 24 Hrs  T(C): 36.6 (18 Jul 2017 07:00), Max: 37.3 (18 Jul 2017 04:00)  T(F): 97.8 (18 Jul 2017 07:00), Max: 99.1 (18 Jul 2017 04:00)  HR: 84 (18 Jul 2017 07:00) (59 - 128)  BP: 134/65 (18 Jul 2017 07:00) (87/52 - 148/72)  BP(mean): 93 (18 Jul 2017 07:00) (66 - 111)  ABP: --  ABP(mean): --  RR: 19 (18 Jul 2017 07:00) (12 - 25)  SpO2: 99% (18 Jul 2017 07:00) (96% - 100%)    Mode: AC/ CMV (Assist Control/ Continuous Mandatory Ventilation), RR (machine): 12, TV (machine): 400, FiO2: 30, PEEP: 5, ITime: 1, MAP: 10, PIP: 23    07-17 @ 07:01  -  07-18 @ 07:00  --------------------------------------------------------  IN: 1667.8 mL / OUT: 0 mL / NET: 1667.8 mL      CAPILLARY BLOOD GLUCOSE          PHYSICAL EXAM:  General: Calm, intubated in bed   HEENT: Eyes tracking. Intubated   Lymph Nodes:  Neck: No JVD  Respiratory: Clear lungs field b/l Some bronchial breath sounds   Cardiovascular: S1/S2 No murmurs  Abdomen: Firm, NT. ND  Extremities: Well perfused upper extremities. Cold lower extremity, no cyanosis however.  Skin:   Neurological: VEEG currently. Eyes tracking. Sedated with Precedex  Psychiatry:    LINES: [facility]    HOSPITAL MEDICATIONS:  heparin  Injectable 5000 Unit(s) SubCutaneous every 8 hours        levothyroxine 75 MICROGram(s) Oral daily      carbidopa/levodopa  25/100 2 Tablet(s) Oral every 4 hours  fosphenytoin IVPB 100 milliGRAM(s) PE IV Intermittent every 8 hours  LORazepam   Injectable 1 milliGRAM(s) IV Push every 4 hours PRN  dexmedetomidine Infusion 0.3 MICROgram(s)/kG/Hr IV Continuous <Continuous>    pantoprazole   Suspension 40 milliGRAM(s) Oral before breakfast            sodium chloride 0.65% Nasal 1 Spray(s) Both Nostrils three times a day  BACItracin   Ointment 1 Application(s) Topical two times a day  chlorhexidine 0.12% Liquid 15 milliLiter(s) Swish and Spit two times a day            LABS:                        9.6    7.0   )-----------( 160      ( 18 Jul 2017 02:52 )             29.4     Hgb Trend: 9.6<--, 10.5<--, 10.2<--, 10.6<--, 10.4<--  07-18    144  |  104  |  15  ----------------------------<  118<H>  4.2   |  29  |  0.42<L>    Ca    8.5      18 Jul 2017 02:52  Phos  3.3     07-18  Mg     2.0     07-18    TPro  5.9<L>  /  Alb  3.0<L>  /  TBili  0.2  /  DBili  x   /  AST  18  /  ALT  5<L>  /  AlkPhos  66  07-18    Creatinine Trend: 0.42<--, 0.39<--, 0.45<--, 0.51<--, 0.58<--, 0.92<--  PT/INR - ( 18 Jul 2017 02:52 )   PT: 10.9 sec;   INR: 1.01 ratio         PTT - ( 18 Jul 2017 02:52 )  PTT:35.9 sec          MICROBIOLOGY:     RADIOLOGY:  [ ] Reviewed and interpreted by me    EKG:

## 2017-07-18 NOTE — EEG REPORT - NS EEG TEXT BOX
Study Date: 2017    Study Information:    EEG Recording Technique:  The patient underwent continuous Video-EEG monitoring, using Telemetry System hardware on the XLTek Digital System. EEG and video data were stored on a computer hard drive with important events saved in digital archive files. The material was reviewed by a physician (electroencephalographer / epileptologist) on a daily basis. Clif and seizure detection algorithms were utilized and reviewed. An EEG Technician attended to the patient, and was available throughout daytime work hours.  The epilepsy center neurologist was available in person or on call 24-hours per day.  EEG Placement and Labeling of Electrodes:  The EEG was performed utilizing 20 channel referential EEG connections (coronal over temporal over parasagittal montage) using all standard 10-20 electrode placements with EKG, with additional electrodes placed in the inferior temporal region using the modified 10-10 montage electrode placements for elective admissions, or if deemed necessary. Recording was at a sampling rate of 256 samples per second per channel. Time synchronized digital video recording was done simultaneously with EEG recording. A low light infrared camera was used for low light recording.   CSA Technical Component:  Quantitative EEG analysis using a separate Compressed Spectral Array (CSA) software package was conducted in real-time and run at bedside after set up by the technician, digitally displaying the power of electrographic frequencies included in the 1-30Hz band using a graded color map. This data was reviewed and interpreted independently, and is reported in a separate section below.    History:  -R/O Seizure     Medication	  No Data.	    Interpretation:    Startin2017    FINDINGS:   Awake Background:  -The background was continuous, disorganized, non- variable, and reactive. There was no clear posterior dominant rhythm present. The background consisted of diffuse irregular beta activity with intermittent irregular delta activity intermixed diffusely. There was an accentuation of the fast rhythm amplitude over the right frontal region.       Sleep Background:  -Sleep architecture could not be visualized.    Interictal Epileptiform Activity:   -No epileptiform discharges were present.    Events:  Near-continuous left upper extremity tremor with tremor artifact seen on EEG.    Activation Procedures:   -No activation procedures were performed during this study.    Artifacts:  Intermittent myogenic and movement artifact noted.     Heart rate:  Regular predominantly between 72-80 BPM.    Daily Compressed Spectral Array Digital Analysis    FINDINGS: Compressed Spectral Array (CSA) data was reviewed and correlated with the electroencephalographic findings detailed above. CSA showed a variable spectral pattern. Areas of increased power in particular were reviewed in detail, and compared with the raw EEG data. Areas of abrupt increases in spectral power were reviewed to exclude seizures, and were determined to be artifactual in nature.     The relative ratio of the power of delta range frequencies and faster frequencies remained stable over the course of the study. There was no definitive increase in the relative power in the delta frequency spectrum apparent in the left hemisphere versus the right hemisphere.     Compressed Spectral Array (Digital Analysis) Summary/ Impression:  No persistent hemispheric asymmetry. Intermittent areas of increased power reviewed, without definite epileptiform activity associated on CSA.     EEG Summary/Classification:  -Abnormal prolonged video EEG due to:  1)  Breach Artifact over the right frontal region  2)  Diffuse Beta Activity   3)  Moderate Background Slowing  4) Left upper extremity tremor with tremor artifact on EEG    EEG Impression/Clinical Correlate:  -Findings indicate nonspecific diffuse or multifocal cerebral dysfunction. Prominent beta activity may be a normal variant or may be seen in the setting of sedative medication use. 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  Left upper limb tremor not epileptic in nature.    (Preliminary Report)      Cora Rios DO  Fellow in Neurophysiology/Epilepsy, [facility] [place]  	  Minh Maria M.D.			   Attending Physician, [facility] [place] Study Date: 2017    Study Information:    EEG Recording Technique:  The patient underwent continuous Video-EEG monitoring, using Telemetry System hardware on the XLTek Digital System. EEG and video data were stored on a computer hard drive with important events saved in digital archive files. The material was reviewed by a physician (electroencephalographer / epileptologist) on a daily basis. Clif and seizure detection algorithms were utilized and reviewed. An EEG Technician attended to the patient, and was available throughout daytime work hours.  The epilepsy center neurologist was available in person or on call 24-hours per day.  EEG Placement and Labeling of Electrodes:  The EEG was performed utilizing 20 channel referential EEG connections (coronal over temporal over parasagittal montage) using all standard 10-20 electrode placements with EKG, with additional electrodes placed in the inferior temporal region using the modified 10-10 montage electrode placements for elective admissions, or if deemed necessary. Recording was at a sampling rate of 256 samples per second per channel. Time synchronized digital video recording was done simultaneously with EEG recording. A low light infrared camera was used for low light recording.   CSA Technical Component:  Quantitative EEG analysis using a separate Compressed Spectral Array (CSA) software package was conducted in real-time and run at bedside after set up by the technician, digitally displaying the power of electrographic frequencies included in the 1-30Hz band using a graded color map. This data was reviewed and interpreted independently, and is reported in a separate section below.    History:  -R/O Seizure     Medication	  No Data.	    Interpretation:    Startin2017    FINDINGS:   Awake Background:  -The background was continuous, disorganized, non- variable, and reactive. There was no clear posterior dominant rhythm present. The background consisted of diffuse irregular beta activity with intermittent irregular delta activity intermixed diffusely. There was an accentuation of the fast rhythm amplitude over the right frontal region.       Sleep Background:  -Sleep architecture could not be visualized    Interictal Epileptiform Activity:   -No epileptiform discharges were present.    Events:  Near-continuous left upper extremity tremor with tremor artifact seen on EEG.    Activation Procedures:   -No activation procedures were performed during this study.    Artifacts:  Intermittent myogenic and movement artifact noted.     Heart rate:  Regular predominantly between 72-80 BPM.    Daily Compressed Spectral Array Digital Analysis    FINDINGS: Compressed Spectral Array (CSA) data was reviewed and correlated with the electroencephalographic findings detailed above. CSA showed a variable spectral pattern. Areas of increased power in particular were reviewed in detail, and compared with the raw EEG data. Areas of abrupt increases in spectral power were reviewed to exclude seizures, and were determined to be artifactual in nature.     The relative ratio of the power of delta range frequencies and faster frequencies remained stable over the course of the study. There was no definitive increase in the relative power in the delta frequency spectrum apparent in the left hemisphere versus the right hemisphere.     Compressed Spectral Array (Digital Analysis) Summary/ Impression:  No persistent hemispheric asymmetry. Intermittent areas of increased power reviewed, without definite epileptiform activity associated on CSA.     EEG Summary/Classification:  -Abnormal prolonged video EEG due to:  1)  Breach Artifact over the right frontal region  2)  Diffuse Beta Activity   3)  Moderate Background Slowing  4) Left upper extremity tremor with tremor artifact on EEG    EEG Impression/Clinical Correlate:  -Findings indicate nonspecific diffuse or multifocal cerebral dysfunction. Prominent beta activity may be a normal variant or may be seen in the setting of sedative medication use. 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  Left upper limb tremor not epileptic in nature.    (Preliminary Report)      Cora Rios DO  Fellow in Neurophysiology/Epilepsy, [facility] [place]  	  Minh Maria M.D.			   Attending Physician, [facility] [place]

## 2017-07-18 NOTE — EEG REPORT - THIS IS A
Continuous Video EEG with [facility]
Routine EEG with [facility]
Continuous Video EEG with [facility]

## 2017-07-19 LAB
ALBUMIN SERPL ELPH-MCNC: 3.1 G/DL — LOW (ref 3.3–5)
ALP SERPL-CCNC: 75 U/L — SIGNIFICANT CHANGE UP (ref 40–120)
ALT FLD-CCNC: 4 U/L RC — LOW (ref 10–45)
ANION GAP SERPL CALC-SCNC: 10 MMOL/L — SIGNIFICANT CHANGE UP (ref 5–17)
APTT BLD: 42.1 SEC — HIGH (ref 27.5–37.4)
AST SERPL-CCNC: 19 U/L — SIGNIFICANT CHANGE UP (ref 10–40)
BASOPHILS # BLD AUTO: 0 K/UL — SIGNIFICANT CHANGE UP (ref 0–0.2)
BASOPHILS NFR BLD AUTO: 0.2 % — SIGNIFICANT CHANGE UP (ref 0–2)
BILIRUB SERPL-MCNC: 0.2 MG/DL — SIGNIFICANT CHANGE UP (ref 0.2–1.2)
BUN SERPL-MCNC: 16 MG/DL — SIGNIFICANT CHANGE UP (ref 7–23)
CALCIUM SERPL-MCNC: 8.8 MG/DL — SIGNIFICANT CHANGE UP (ref 8.4–10.5)
CHLORIDE SERPL-SCNC: 102 MMOL/L — SIGNIFICANT CHANGE UP (ref 96–108)
CO2 SERPL-SCNC: 30 MMOL/L — SIGNIFICANT CHANGE UP (ref 22–31)
CREAT SERPL-MCNC: 0.44 MG/DL — LOW (ref 0.5–1.3)
CULTURE RESULTS: SIGNIFICANT CHANGE UP
CULTURE RESULTS: SIGNIFICANT CHANGE UP
EOSINOPHIL # BLD AUTO: 0.2 K/UL — SIGNIFICANT CHANGE UP (ref 0–0.5)
EOSINOPHIL NFR BLD AUTO: 2.9 % — SIGNIFICANT CHANGE UP (ref 0–6)
GLUCOSE SERPL-MCNC: 124 MG/DL — HIGH (ref 70–99)
HCT VFR BLD CALC: 30.7 % — LOW (ref 34.5–45)
HGB BLD-MCNC: 10.1 G/DL — LOW (ref 11.5–15.5)
INR BLD: 0.96 RATIO — SIGNIFICANT CHANGE UP (ref 0.88–1.16)
LYMPHOCYTES # BLD AUTO: 0.9 K/UL — LOW (ref 1–3.3)
LYMPHOCYTES # BLD AUTO: 11 % — LOW (ref 13–44)
MAGNESIUM SERPL-MCNC: 2.3 MG/DL — SIGNIFICANT CHANGE UP (ref 1.6–2.6)
MCHC RBC-ENTMCNC: 32.9 GM/DL — SIGNIFICANT CHANGE UP (ref 32–36)
MCHC RBC-ENTMCNC: 33.2 PG — SIGNIFICANT CHANGE UP (ref 27–34)
MCV RBC AUTO: 101 FL — HIGH (ref 80–100)
MONOCYTES # BLD AUTO: 0.6 K/UL — SIGNIFICANT CHANGE UP (ref 0–0.9)
MONOCYTES NFR BLD AUTO: 7.7 % — SIGNIFICANT CHANGE UP (ref 2–14)
NEUTROPHILS # BLD AUTO: 6.2 K/UL — SIGNIFICANT CHANGE UP (ref 1.8–7.4)
NEUTROPHILS NFR BLD AUTO: 78.2 % — HIGH (ref 43–77)
PHOSPHATE SERPL-MCNC: 3.6 MG/DL — SIGNIFICANT CHANGE UP (ref 2.5–4.5)
PLATELET # BLD AUTO: 180 K/UL — SIGNIFICANT CHANGE UP (ref 150–400)
POTASSIUM SERPL-MCNC: 4.3 MMOL/L — SIGNIFICANT CHANGE UP (ref 3.5–5.3)
POTASSIUM SERPL-SCNC: 4.3 MMOL/L — SIGNIFICANT CHANGE UP (ref 3.5–5.3)
PROT SERPL-MCNC: 6.5 G/DL — SIGNIFICANT CHANGE UP (ref 6–8.3)
PROTHROM AB SERPL-ACNC: 10.4 SEC — SIGNIFICANT CHANGE UP (ref 9.8–12.7)
RBC # BLD: 3.04 M/UL — LOW (ref 3.8–5.2)
RBC # FLD: 13.2 % — SIGNIFICANT CHANGE UP (ref 10.3–14.5)
SODIUM SERPL-SCNC: 142 MMOL/L — SIGNIFICANT CHANGE UP (ref 135–145)
SPECIMEN SOURCE: SIGNIFICANT CHANGE UP
SPECIMEN SOURCE: SIGNIFICANT CHANGE UP
WBC # BLD: 7.9 K/UL — SIGNIFICANT CHANGE UP (ref 3.8–10.5)
WBC # FLD AUTO: 7.9 K/UL — SIGNIFICANT CHANGE UP (ref 3.8–10.5)

## 2017-07-19 PROCEDURE — 95951: CPT | Mod: 26,52

## 2017-07-19 PROCEDURE — 95957 EEG DIGITAL ANALYSIS: CPT | Mod: 26

## 2017-07-19 RX ORDER — IPRATROPIUM/ALBUTEROL SULFATE 18-103MCG
3 AEROSOL WITH ADAPTER (GRAM) INHALATION ONCE
Qty: 0 | Refills: 0 | Status: COMPLETED | OUTPATIENT
Start: 2017-07-19 | End: 2017-07-19

## 2017-07-19 RX ORDER — EPINEPHRINE 11.25MG/ML
0.5 SOLUTION, NON-ORAL INHALATION ONCE
Qty: 0 | Refills: 0 | Status: COMPLETED | OUTPATIENT
Start: 2017-07-19 | End: 2017-07-19

## 2017-07-19 RX ORDER — LABETALOL HCL 100 MG
10 TABLET ORAL ONCE
Qty: 0 | Refills: 0 | Status: COMPLETED | OUTPATIENT
Start: 2017-07-19 | End: 2017-07-19

## 2017-07-19 RX ORDER — SODIUM CHLORIDE 9 MG/ML
500 INJECTION INTRAMUSCULAR; INTRAVENOUS; SUBCUTANEOUS ONCE
Qty: 0 | Refills: 0 | Status: COMPLETED | OUTPATIENT
Start: 2017-07-19 | End: 2017-07-19

## 2017-07-19 RX ADMIN — FOSPHENYTOIN 104 MILLIGRAM(S) PE: 50 INJECTION INTRAMUSCULAR; INTRAVENOUS at 22:30

## 2017-07-19 RX ADMIN — Medication 3 MILLILITER(S): at 17:07

## 2017-07-19 RX ADMIN — Medication 1 MILLIGRAM(S): at 17:07

## 2017-07-19 RX ADMIN — Medication 10 MILLIGRAM(S): at 17:24

## 2017-07-19 RX ADMIN — Medication 75 MICROGRAM(S): at 05:55

## 2017-07-19 RX ADMIN — HEPARIN SODIUM 5000 UNIT(S): 5000 INJECTION INTRAVENOUS; SUBCUTANEOUS at 22:30

## 2017-07-19 RX ADMIN — CARBIDOPA AND LEVODOPA 2 TABLET(S): 25; 100 TABLET ORAL at 12:34

## 2017-07-19 RX ADMIN — Medication 1 MILLIGRAM(S): at 22:50

## 2017-07-19 RX ADMIN — FOSPHENYTOIN 104 MILLIGRAM(S) PE: 50 INJECTION INTRAMUSCULAR; INTRAVENOUS at 05:54

## 2017-07-19 RX ADMIN — SODIUM CHLORIDE 1000 MILLILITER(S): 9 INJECTION INTRAMUSCULAR; INTRAVENOUS; SUBCUTANEOUS at 18:00

## 2017-07-19 RX ADMIN — CARBIDOPA AND LEVODOPA 2 TABLET(S): 25; 100 TABLET ORAL at 03:10

## 2017-07-19 RX ADMIN — CARBIDOPA AND LEVODOPA 2 TABLET(S): 25; 100 TABLET ORAL at 08:19

## 2017-07-19 RX ADMIN — Medication 1 SPRAY(S): at 14:17

## 2017-07-19 RX ADMIN — Medication 0.5 MILLILITER(S): at 17:06

## 2017-07-19 RX ADMIN — FOSPHENYTOIN 104 MILLIGRAM(S) PE: 50 INJECTION INTRAMUSCULAR; INTRAVENOUS at 14:17

## 2017-07-19 RX ADMIN — CARBIDOPA AND LEVODOPA 2 TABLET(S): 25; 100 TABLET ORAL at 20:18

## 2017-07-19 RX ADMIN — Medication 40 MILLIGRAM(S): at 17:06

## 2017-07-19 RX ADMIN — Medication 1 APPLICATION(S): at 17:06

## 2017-07-19 RX ADMIN — CARBIDOPA AND LEVODOPA 2 TABLET(S): 25; 100 TABLET ORAL at 16:42

## 2017-07-19 RX ADMIN — PANTOPRAZOLE SODIUM 40 MILLIGRAM(S): 20 TABLET, DELAYED RELEASE ORAL at 06:04

## 2017-07-19 RX ADMIN — Medication 1 SPRAY(S): at 22:30

## 2017-07-19 RX ADMIN — DEXMEDETOMIDINE HYDROCHLORIDE IN 0.9% SODIUM CHLORIDE 3.64 MICROGRAM(S)/KG/HR: 4 INJECTION INTRAVENOUS at 17:07

## 2017-07-19 RX ADMIN — HEPARIN SODIUM 5000 UNIT(S): 5000 INJECTION INTRAVENOUS; SUBCUTANEOUS at 14:17

## 2017-07-19 RX ADMIN — Medication 1 SPRAY(S): at 05:55

## 2017-07-19 RX ADMIN — CHLORHEXIDINE GLUCONATE 15 MILLILITER(S): 213 SOLUTION TOPICAL at 05:54

## 2017-07-19 RX ADMIN — Medication 1 MILLIGRAM(S): at 11:07

## 2017-07-19 RX ADMIN — Medication 1 APPLICATION(S): at 05:54

## 2017-07-19 RX ADMIN — HEPARIN SODIUM 5000 UNIT(S): 5000 INJECTION INTRAVENOUS; SUBCUTANEOUS at 05:54

## 2017-07-19 NOTE — PROGRESS NOTE ADULT - ATTENDING COMMENTS
Doing better today.  Still with jerky movements c/w H/O Parkinson's.  Ativan PRN given patient was on benzodiazepines at home  Did well on pressure support during rounds, was extubated afterwards.  Now doing well on ventimask.  c/w fosphenytoin, can titrate off Precedex. Doing better today.  Still with jerky movements c/w H/O Parkinson's.  Ativan PRN given patient was on benzodiazepines at home  Did well on pressure support during rounds, was extubated afterwards.  Now doing well on ventimask.  c/w fosphenytoin, can titrate off Precedex.    Critical care time: 35 minutes

## 2017-07-19 NOTE — PROGRESS NOTE ADULT - SUBJECTIVE AND OBJECTIVE BOX
CHIEF COMPLAINT:    Interval Events:    Interval Events: Overnight jerking movements with loss of occular tracking while on VEEG. Regained ability to track before Ativan was given. Received 4 Ativan overnight for agitation. On .3 Precedex, fosphenytoin, remains intubated on VEEG.    REVIEW OF SYSTEMS:  Constitutional: [ ] negative [ ] fevers [ ] chills [ ] weight loss [ ] weight gain  HEENT: [ ] negative [ ] dry eyes [ ] eye irritation [ ] postnasal drip [ ] nasal congestion  CV: [ ] negative  [ ] chest pain [ ] orthopnea [ ] palpitations [ ] murmur  Resp: [ ] negative [ ] cough [ ] shortness of breath [ ] dyspnea [ ] wheezing [ ] sputum [ ] hemoptysis  GI: [ ] negative [ ] nausea [ ] vomiting [ ] diarrhea [ ] constipation [ ] abd pain [ ] dysphagia   : [ ] negative [ ] dysuria [ ] nocturia [ ] hematuria [ ] increased urinary frequency  Musculoskeletal: [ ] negative [ ] back pain [ ] myalgias [ ] arthralgias [ ] fracture  Skin: [ ] negative [ ] rash [ ] itch  Neurological: [ ] negative [ ] headache [ ] dizziness [ ] syncope [ ] weakness [ ] numbness  Psychiatric: [ ] negative [ ] anxiety [ ] depression  Endocrine: [ ] negative [ ] diabetes [ ] thyroid problem  Hematologic/Lymphatic: [ ] negative [ ] anemia [ ] bleeding problem  Allergic/Immunologic: [ ] negative [ ] itchy eyes [ ] nasal discharge [ ] hives [ ] angioedema  [ ] All other systems negative  [X] Unable to assess ROS because ________    OBJECTIVE:  ICU Vital Signs Last 24 Hrs  T(C): 37.3 (19 Jul 2017 04:00), Max: 37.3 (19 Jul 2017 04:00)  T(F): 99.1 (19 Jul 2017 04:00), Max: 99.1 (19 Jul 2017 04:00)  HR: 100 (19 Jul 2017 06:00) (58 - 150)  BP: 117/77 (19 Jul 2017 06:00) (81/49 - 154/85)  BP(mean): 93 (19 Jul 2017 06:00) (61 - 114)  ABP: --  ABP(mean): --  RR: 12 (19 Jul 2017 06:00) (12 - 66)  SpO2: 99% (19 Jul 2017 06:00) (79% - 100%)    Mode: AC/ CMV (Assist Control/ Continuous Mandatory Ventilation), RR (machine): 12, TV (machine): 400, FiO2: 30, PEEP: 5, ITime: 1, MAP: 8, PIP: 24    07-18 @ 07:01  -  07-19 @ 07:00  --------------------------------------------------------  IN: 1524.1 mL / OUT: 0 mL / NET: 1524.1 mL      CAPILLARY BLOOD GLUCOSE    PHYSICAL EXAM:  General: Calm, intubated in bed   HEENT: Eyes tracking. Intubated   Lymph Nodes:  Neck: No JVD  Respiratory: Clear lungs field b/l Some bronchial breath sounds   Cardiovascular: S1/S2 No murmurs  Abdomen: Firm, NT. ND  Extremities: Well perfused upper extremities. Cold lower extremity, no cyanosis however.  Skin:   Neurological: VEEG currently. Eyes tracking. Sedated with Precedex  Psychiatry:    LINES:    HOSPITAL MEDICATIONS:  heparin  Injectable 5000 Unit(s) SubCutaneous every 8 hours        levothyroxine 75 MICROGram(s) Oral daily      carbidopa/levodopa  25/100 2 Tablet(s) Oral every 4 hours  fosphenytoin IVPB 100 milliGRAM(s) PE IV Intermittent every 8 hours  dexmedetomidine Infusion 0.3 MICROgram(s)/kG/Hr IV Continuous <Continuous>  fentaNYL    Injectable 25 MICROGram(s) IV Push every 2 hours PRN    pantoprazole   Suspension 40 milliGRAM(s) Oral before breakfast            sodium chloride 0.65% Nasal 1 Spray(s) Both Nostrils three times a day  BACItracin   Ointment 1 Application(s) Topical two times a day  chlorhexidine 0.12% Liquid 15 milliLiter(s) Swish and Spit two times a day            LABS:                        10.1   7.9   )-----------( 180      ( 19 Jul 2017 02:41 )             30.7     Hgb Trend: 10.1<--, 9.6<--, 10.5<--, 10.2<--, 10.6<--  07-19    142  |  102  |  16  ----------------------------<  124<H>  4.3   |  30  |  0.44<L>    Ca    8.8      19 Jul 2017 02:41  Phos  3.6     07-19  Mg     2.3     07-19    TPro  6.5  /  Alb  3.1<L>  /  TBili  0.2  /  DBili  x   /  AST  19  /  ALT  4<L>  /  AlkPhos  75  07-19    Creatinine Trend: 0.44<--, 0.42<--, 0.39<--, 0.45<--, 0.51<--, 0.58<--  PT/INR - ( 19 Jul 2017 02:41 )   PT: 10.4 sec;   INR: 0.96 ratio         PTT - ( 19 Jul 2017 02:41 )  PTT:42.1 sec          MICROBIOLOGY:     RADIOLOGY:  [ ] Reviewed and interpreted by me    EKG: CHIEF COMPLAINT: Epistaxis    Interval Events: No EEG activity overnight, off 24 EEG monitoring tracking with eyes, continues to have tremors, extubated , sating well on face mask     REVIEW OF SYSTEMS:  Constitutional: [ ] negative [ ] fevers [ ] chills [ ] weight loss [ ] weight gain  HEENT: [ ] negative [ ] dry eyes [ ] eye irritation [ ] postnasal drip [ ] nasal congestion  CV: [ ] negative  [ ] chest pain [ ] orthopnea [ ] palpitations [ ] murmur  Resp: [ ] negative [ ] cough [ ] shortness of breath [ ] dyspnea [ ] wheezing [ ] sputum [ ] hemoptysis  GI: [ ] negative [ ] nausea [ ] vomiting [ ] diarrhea [ ] constipation [ ] abd pain [ ] dysphagia   : [ ] negative [ ] dysuria [ ] nocturia [ ] hematuria [ ] increased urinary frequency  Musculoskeletal: [ ] negative [ ] back pain [ ] myalgias [ ] arthralgias [ ] fracture  Skin: [ ] negative [ ] rash [ ] itch  Neurological: [ ] negative [ ] headache [ ] dizziness [ ] syncope [ ] weakness [ ] numbness  Psychiatric: [ ] negative [ ] anxiety [ ] depression  Endocrine: [ ] negative [ ] diabetes [ ] thyroid problem  Hematologic/Lymphatic: [ ] negative [ ] anemia [ ] bleeding problem  Allergic/Immunologic: [ ] negative [ ] itchy eyes [ ] nasal discharge [ ] hives [ ] angioedema  [ ] All other systems negative  [X] Unable to assess ROS because ________    OBJECTIVE:  ICU Vital Signs Last 24 Hrs  T(C): 37.3 (19 Jul 2017 04:00), Max: 37.3 (19 Jul 2017 04:00)  T(F): 99.1 (19 Jul 2017 04:00), Max: 99.1 (19 Jul 2017 04:00)  HR: 100 (19 Jul 2017 06:00) (58 - 150)  BP: 117/77 (19 Jul 2017 06:00) (81/49 - 154/85)  BP(mean): 93 (19 Jul 2017 06:00) (61 - 114)  ABP: --  ABP(mean): --  RR: 12 (19 Jul 2017 06:00) (12 - 66)  SpO2: 99% (19 Jul 2017 06:00) (79% - 100%)    Mode: AC/ CMV (Assist Control/ Continuous Mandatory Ventilation), RR (machine): 12, TV (machine): 400, FiO2: 30, PEEP: 5, ITime: 1, MAP: 8, PIP: 24    07-18 @ 07:01  -  07-19 @ 07:00  --------------------------------------------------------  IN: 1524.1 mL / OUT: 0 mL / NET: 1524.1 mL      CAPILLARY BLOOD GLUCOSE    PHYSICAL EXAM:  General: Calm, Breathing well in bed   HEENT: Eyes tracking.   Neck: No JVD  Respiratory: Clear lungs field b/l Some bronchial breath sounds   Cardiovascular: S1/S2 No murmurs  Abdomen: Firm, NT. ND  Extremities: Well perfused upper extremities. Cold lower extremity, no cyanosis however.  Neurological: Eyes tracking. Sedated with Precedex    LINES:    HOSPITAL MEDICATIONS:  heparin  Injectable 5000 Unit(s) SubCutaneous every 8 hours  levothyroxine 75 MICROGram(s) Oral daily  carbidopa/levodopa  25/100 2 Tablet(s) Oral every 4 hours  fosphenytoin IVPB 100 milliGRAM(s) PE IV Intermittent every 8 hours  dexmedetomidine Infusion 0.3 MICROgram(s)/kG/Hr IV Continuous <Continuous>  fentaNYL    Injectable 25 MICROGram(s) IV Push every 2 hours PRN  pantoprazole   Suspension 40 milliGRAM(s) Oral before breakfast  sodium chloride 0.65% Nasal 1 Spray(s) Both Nostrils three times a day  BACItracin   Ointment 1 Application(s) Topical two times a day  chlorhexidine 0.12% Liquid 15 milliLiter(s) Swish and Spit two times a day    LABS:                        10.1   7.9   )-----------( 180      ( 19 Jul 2017 02:41 )             30.7     Hgb Trend: 10.1<--, 9.6<--, 10.5<--, 10.2<--, 10.6<--  07-19    142  |  102  |  16  ----------------------------<  124<H>  4.3   |  30  |  0.44<L>    Ca    8.8      19 Jul 2017 02:41  Phos  3.6     07-19  Mg     2.3     07-19    TPro  6.5  /  Alb  3.1<L>  /  TBili  0.2  /  DBili  x   /  AST  19  /  ALT  4<L>  /  AlkPhos  75  07-19    Creatinine Trend: 0.44<--, 0.42<--, 0.39<--, 0.45<--, 0.51<--, 0.58<--  PT/INR - ( 19 Jul 2017 02:41 )   PT: 10.4 sec;   INR: 0.96 ratio         PTT - ( 19 Jul 2017 02:41 )  PTT:42.1 sec          MICROBIOLOGY:   Culture - Blood (07.14.17 @ 14:01)    Specimen Source: .Blood Blood    Culture Results:   No growth at 5 days.        RADIOLOGY:  [X] Reviewed and interpreted by me

## 2017-07-19 NOTE — PROGRESS NOTE ADULT - ASSESSMENT
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 Avoid benzos   -GOC: patient refused hospice placement in the past. Mother of the patient has been making medical decisions but has recently suffered a stroke.  -Neurology following, recommendations appreciated.    #Respiratory  Acute respiratory failure with hypoxia and hypercapnia 2/2 Epistaxis  - wean FiO2, keep Sat >92%  - Airway compromise 2/2 epistaxis- now resolved  - Consider Extubation  - GI Prophylaxis: Protonix     #Cardiac  Troponin trending down, No acute concerns on EKG    #Heme  Epistaxis, recurrent.    - s/p Nasal packing removal, off ABX for packing prophylaxis  - Monitor CBC daily  -continue nasal saline b/l 3x/day, Bacitracin Ointment B/L 2x/day  - Airway protected with ET Tube    #Endo  Hypothyroidism    - Continue synthroid 75 mcg/daily  - Check TSH level.     #Prophylactic  Plan: HSQ. 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 Avoid benzos   -GOC: patient refused hospice placement in the past. Mother of the patient has been making medical decisions but has recently suffered a stroke.  -Neurology following, recommendations appreciated.    #Respiratory  Acute respiratory failure with hypoxia and hypercapnia 2/2 Epistaxis  - wean FiO2, keep Sat >92%  - Airway compromise 2/2 epistaxis- now resolved  - Extubated  - GI Prophylaxis: Protonix     #Cardiac  Troponin trending down, No acute concerns on EKG    #Heme  Epistaxis, recurrent.    - s/p Nasal packing removal, off ABX for packing prophylaxis  - Monitor CBC daily  -continue nasal saline b/l 3x/day, Bacitracin Ointment B/L 2x/day    #Endo  Hypothyroidism    - Continue synthroid 75 mcg/daily  - Check TSH level.     #Prophylactic  Plan: HSQ.

## 2017-07-19 NOTE — AIRWAY REMOVAL NOTE  ADULT & PEDS - ARTIFICAL AIRWAY REMOVAL COMMENTS
Written order for extubation verified. The patient was identified by full name and birth date compared to the identification band. Present during the procedure was JOE blount

## 2017-07-19 NOTE — CHART NOTE - NSCHARTNOTEFT_GEN_A_CORE
Adm dx: epistaxis, respiratory failure, extubated today. On PEG feeds.    Source: Patient [ ]    Family [ ]     other [x ]    Diet : Jevity 1.2 6occ/hr x 18 hrs via PEG      Patient reports [ ] nausea  [ ] vomiting [ ] diarrhea [ ] constipation  [ ]chewing problems [ ] swallowing issues  [ ] other: had BM today          Enteral /Parenteral Nutrition: Jevity 1.2 goal 1080cc/day.   24 hr intake as of 7am today was 1020cc (94% of estimated needs), 7/18 1080cc (100% of needs)      Current Weight: Weight 7/19 106.9 lb, no changes    Pertinent Medications: MEDICATIONS  (STANDING):  carbidopa/levodopa  25/100 2 Tablet(s) Oral every 4 hours  heparin  Injectable 5000 Unit(s) SubCutaneous every 8 hours  levothyroxine 75 MICROGram(s) Oral daily  pantoprazole   Suspension 40 milliGRAM(s) Oral before breakfast  fosphenytoin IVPB 100 milliGRAM(s) PE IV Intermittent every 8 hours  sodium chloride 0.65% Nasal 1 Spray(s) Both Nostrils three times a day  BACItracin   Ointment 1 Application(s) Topical two times a day  chlorhexidine 0.12% Liquid 15 milliLiter(s) Swish and Spit two times a day  dexmedetomidine Infusion 0.3 MICROgram(s)/kG/Hr (3.637 mL/Hr) IV Continuous <Continuous>    MEDICATIONS  (PRN):  fentaNYL    Injectable 25 MICROGram(s) IV Push every 2 hours PRN Moderate Pain (4 - 6)  LORazepam   Injectable 1 milliGRAM(s) IV Push every 6 hours PRN Agitation    Pertinent Labs:  07-19 Na142 mmol/L Glu 124 mg/dL<H> K+ 4.3 mmol/L Cr  0.44 mg/dL<L> BUN 16 mg/dL Phos 3.6 mg/dL        Skin: no pressure injuries    Estimated Needs:   [x ] no change since previous assessment  [ ] recalculated:       Previous Nutrition Diagnosis: swallowing difficulty    Nutrition Diagnosis is [x ] ongoing  [ ] resolved [ ] not applicable          New Nutrition Diagnosis: [x ] not applicable      Recommend    [ ] Change Diet To:    [ ] Nutrition Supplement    [X ] Nutrition Support: Recommend PEG feeds Jevity 1.2 at 60 cc/hr x 18 hrs provides 1296 kcals, 60 gm protein, 872 cc free water  for 27 kcals/kg, 1.2 gm protein/kg dosing wt of 48.5kg    [ ] Other:        Monitoring and Evaluation:     [ ] PO intake [ ] Tolerance to diet prescription [x ] weights [x ] follow up per protocol    [x ] other: labs, tube feeding tolerance

## 2017-07-20 DIAGNOSIS — G20 PARKINSON'S DISEASE: ICD-10-CM

## 2017-07-20 LAB
ALBUMIN SERPL ELPH-MCNC: 3.4 G/DL — SIGNIFICANT CHANGE UP (ref 3.3–5)
ALP SERPL-CCNC: 83 U/L — SIGNIFICANT CHANGE UP (ref 40–120)
ALT FLD-CCNC: 5 U/L RC — LOW (ref 10–45)
ANION GAP SERPL CALC-SCNC: 12 MMOL/L — SIGNIFICANT CHANGE UP (ref 5–17)
APTT BLD: 34.2 SEC — SIGNIFICANT CHANGE UP (ref 27.5–37.4)
AST SERPL-CCNC: 23 U/L — SIGNIFICANT CHANGE UP (ref 10–40)
BASOPHILS # BLD AUTO: 0 K/UL — SIGNIFICANT CHANGE UP (ref 0–0.2)
BASOPHILS NFR BLD AUTO: 0.6 % — SIGNIFICANT CHANGE UP (ref 0–2)
BILIRUB SERPL-MCNC: 0.2 MG/DL — SIGNIFICANT CHANGE UP (ref 0.2–1.2)
BUN SERPL-MCNC: 9 MG/DL — SIGNIFICANT CHANGE UP (ref 7–23)
CALCIUM SERPL-MCNC: 9.3 MG/DL — SIGNIFICANT CHANGE UP (ref 8.4–10.5)
CHLORIDE SERPL-SCNC: 102 MMOL/L — SIGNIFICANT CHANGE UP (ref 96–108)
CO2 SERPL-SCNC: 30 MMOL/L — SIGNIFICANT CHANGE UP (ref 22–31)
CREAT SERPL-MCNC: 0.35 MG/DL — LOW (ref 0.5–1.3)
EOSINOPHIL # BLD AUTO: 0 K/UL — SIGNIFICANT CHANGE UP (ref 0–0.5)
EOSINOPHIL NFR BLD AUTO: 0.7 % — SIGNIFICANT CHANGE UP (ref 0–6)
GAS PNL BLDA: SIGNIFICANT CHANGE UP
GLUCOSE SERPL-MCNC: 93 MG/DL — SIGNIFICANT CHANGE UP (ref 70–99)
HCT VFR BLD CALC: 31.7 % — LOW (ref 34.5–45)
HGB BLD-MCNC: 10.4 G/DL — LOW (ref 11.5–15.5)
INR BLD: 0.97 RATIO — SIGNIFICANT CHANGE UP (ref 0.88–1.16)
LYMPHOCYTES # BLD AUTO: 0.8 K/UL — LOW (ref 1–3.3)
LYMPHOCYTES # BLD AUTO: 13.1 % — SIGNIFICANT CHANGE UP (ref 13–44)
MAGNESIUM SERPL-MCNC: 2.4 MG/DL — SIGNIFICANT CHANGE UP (ref 1.6–2.6)
MCHC RBC-ENTMCNC: 32.9 GM/DL — SIGNIFICANT CHANGE UP (ref 32–36)
MCHC RBC-ENTMCNC: 33.5 PG — SIGNIFICANT CHANGE UP (ref 27–34)
MCV RBC AUTO: 102 FL — HIGH (ref 80–100)
MONOCYTES # BLD AUTO: 0.4 K/UL — SIGNIFICANT CHANGE UP (ref 0–0.9)
MONOCYTES NFR BLD AUTO: 6.4 % — SIGNIFICANT CHANGE UP (ref 2–14)
NEUTROPHILS # BLD AUTO: 4.6 K/UL — SIGNIFICANT CHANGE UP (ref 1.8–7.4)
NEUTROPHILS NFR BLD AUTO: 79.2 % — HIGH (ref 43–77)
PHOSPHATE SERPL-MCNC: 3.2 MG/DL — SIGNIFICANT CHANGE UP (ref 2.5–4.5)
PLATELET # BLD AUTO: 208 K/UL — SIGNIFICANT CHANGE UP (ref 150–400)
POTASSIUM SERPL-MCNC: 4.3 MMOL/L — SIGNIFICANT CHANGE UP (ref 3.5–5.3)
POTASSIUM SERPL-SCNC: 4.3 MMOL/L — SIGNIFICANT CHANGE UP (ref 3.5–5.3)
PROT SERPL-MCNC: 6.9 G/DL — SIGNIFICANT CHANGE UP (ref 6–8.3)
PROTHROM AB SERPL-ACNC: 10.5 SEC — SIGNIFICANT CHANGE UP (ref 9.8–12.7)
RBC # BLD: 3.12 M/UL — LOW (ref 3.8–5.2)
RBC # FLD: 13 % — SIGNIFICANT CHANGE UP (ref 10.3–14.5)
SODIUM SERPL-SCNC: 144 MMOL/L — SIGNIFICANT CHANGE UP (ref 135–145)
WBC # BLD: 5.9 K/UL — SIGNIFICANT CHANGE UP (ref 3.8–10.5)
WBC # FLD AUTO: 5.9 K/UL — SIGNIFICANT CHANGE UP (ref 3.8–10.5)

## 2017-07-20 PROCEDURE — 99232 SBSQ HOSP IP/OBS MODERATE 35: CPT

## 2017-07-20 RX ORDER — CARBIDOPA AND LEVODOPA 25; 100 MG/1; MG/1
1 TABLET ORAL EVERY 4 HOURS
Qty: 0 | Refills: 0 | Status: DISCONTINUED | OUTPATIENT
Start: 2017-07-20 | End: 2017-07-21

## 2017-07-20 RX ADMIN — FOSPHENYTOIN 104 MILLIGRAM(S) PE: 50 INJECTION INTRAMUSCULAR; INTRAVENOUS at 22:12

## 2017-07-20 RX ADMIN — PANTOPRAZOLE SODIUM 40 MILLIGRAM(S): 20 TABLET, DELAYED RELEASE ORAL at 11:47

## 2017-07-20 RX ADMIN — FOSPHENYTOIN 104 MILLIGRAM(S) PE: 50 INJECTION INTRAMUSCULAR; INTRAVENOUS at 05:08

## 2017-07-20 RX ADMIN — Medication 1 SPRAY(S): at 14:30

## 2017-07-20 RX ADMIN — Medication 75 MICROGRAM(S): at 05:01

## 2017-07-20 RX ADMIN — CARBIDOPA AND LEVODOPA 2 TABLET(S): 25; 100 TABLET ORAL at 00:00

## 2017-07-20 RX ADMIN — HEPARIN SODIUM 5000 UNIT(S): 5000 INJECTION INTRAVENOUS; SUBCUTANEOUS at 05:02

## 2017-07-20 RX ADMIN — HEPARIN SODIUM 5000 UNIT(S): 5000 INJECTION INTRAVENOUS; SUBCUTANEOUS at 14:30

## 2017-07-20 RX ADMIN — Medication 1 MILLIGRAM(S): at 01:54

## 2017-07-20 RX ADMIN — Medication 1 SPRAY(S): at 05:02

## 2017-07-20 RX ADMIN — CARBIDOPA AND LEVODOPA 2 TABLET(S): 25; 100 TABLET ORAL at 15:09

## 2017-07-20 RX ADMIN — CHLORHEXIDINE GLUCONATE 15 MILLILITER(S): 213 SOLUTION TOPICAL at 05:02

## 2017-07-20 RX ADMIN — Medication 1 APPLICATION(S): at 05:02

## 2017-07-20 RX ADMIN — CARBIDOPA AND LEVODOPA 2 TABLET(S): 25; 100 TABLET ORAL at 08:11

## 2017-07-20 RX ADMIN — CARBIDOPA AND LEVODOPA 1 TABLET(S): 25; 100 TABLET ORAL at 22:12

## 2017-07-20 RX ADMIN — Medication 1 SPRAY(S): at 22:12

## 2017-07-20 RX ADMIN — CARBIDOPA AND LEVODOPA 2 TABLET(S): 25; 100 TABLET ORAL at 03:22

## 2017-07-20 RX ADMIN — FOSPHENYTOIN 104 MILLIGRAM(S) PE: 50 INJECTION INTRAMUSCULAR; INTRAVENOUS at 14:28

## 2017-07-20 RX ADMIN — CARBIDOPA AND LEVODOPA 1 TABLET(S): 25; 100 TABLET ORAL at 18:20

## 2017-07-20 RX ADMIN — HEPARIN SODIUM 5000 UNIT(S): 5000 INJECTION INTRAVENOUS; SUBCUTANEOUS at 22:12

## 2017-07-20 RX ADMIN — Medication 1 APPLICATION(S): at 18:20

## 2017-07-20 RX ADMIN — CARBIDOPA AND LEVODOPA 2 TABLET(S): 25; 100 TABLET ORAL at 11:47

## 2017-07-20 NOTE — PROGRESS NOTE ADULT - SUBJECTIVE AND OBJECTIVE BOX
Subjective: Intrval History -  patient was extubated. Her mental status improved compared to yesterday. She was following command.     Objective:   Vital Signs Last 24 Hrs  T(C): 36.7 (20 Jul 2017 12:00), Max: 37.4 (19 Jul 2017 15:00)  T(F): 98.1 (20 Jul 2017 12:00), Max: 99.4 (19 Jul 2017 15:00)  HR: 101 (20 Jul 2017 12:00) (69 - 112)  BP: 164/85 (20 Jul 2017 12:00) (72/46 - 212/113)  BP(mean): 107 (20 Jul 2017 12:00) (55 - 151)  RR: 23 (20 Jul 2017 12:00) (18 - 32)  SpO2: 96% (20 Jul 2017 12:00) (94% - 100%)    General Exam:     General appearance: No acute distress                   Neurological Exam:    Mental Status: not orientated to time place and person      Cranial Nerves:  PERRL, EOMI, VFF, no nystagmus or diplopia. CN V1-3 intact to light touch and pinprick.  No facial asymmetry  Tongue, uvula and palate midline.      Motor:     Tone: increased symmetrically                    Strength: limited as patient is noncooperative but  spontaneous movement seen in all four extremity                Tremor: resting tremor seen bilaterally upper extremity     Sensation: intact to light touch    Deep Tendon Reflexes: defered  Gait: deferred     Other:    07-20    144  |  102  |  9   ----------------------------<  93  4.3   |  30  |  0.35<L>    Ca    9.3      20 Jul 2017 04:35  Phos  3.2     07-20  Mg     2.4     07-20    TPro  6.9  /  Alb  3.4  /  TBili  0.2  /  DBili  x   /  AST  23  /  ALT  5<L>  /  AlkPhos  83  07-20    LIVER FUNCTIONS - ( 20 Jul 2017 04:35 )  Alb: 3.4 g/dL / Pro: 6.9 g/dL / ALK PHOS: 83 U/L / ALT: 5 U/L RC / AST: 23 U/L / GGT: x                                 10.4   5.9   )-----------( 208      ( 20 Jul 2017 04:35 )             31.7     Radiology    no recent imaging       MEDICATIONS  (STANDING):  carbidopa/levodopa  25/100 2 Tablet(s) Oral every 4 hours  heparin  Injectable 5000 Unit(s) SubCutaneous every 8 hours  levothyroxine 75 MICROGram(s) Oral daily  pantoprazole   Suspension 40 milliGRAM(s) Oral before breakfast  fosphenytoin IVPB 100 milliGRAM(s) PE IV Intermittent every 8 hours  sodium chloride 0.65% Nasal 1 Spray(s) Both Nostrils three times a day  BACItracin   Ointment 1 Application(s) Topical two times a day  dexmedetomidine Infusion 0.3 MICROgram(s)/kG/Hr (3.637 mL/Hr) IV Continuous <Continuous>    MEDICATIONS  (PRN):  fentaNYL    Injectable 25 MICROGram(s) IV Push every 2 hours PRN Moderate Pain (4 - 6)  LORazepam   Injectable 1 milliGRAM(s) IV Push every 6 hours PRN Agitation

## 2017-07-20 NOTE — PROGRESS NOTE ADULT - ASSESSMENT
62 -year-old F with cognitive impairment, TBI c/b ICH, Drug-induced Parkinson's Disease (neuroleptic use) s/p PEG placement for aspiration, Depression, Schizophrenia, Hypothyroidism, GERD, evaluated for epistaxis, had respiratory distress in the setting of epistaxis and failure to protect airway, admitted to MICU. VEEG discontinued. Patient reported no seizure like activity. Resting tremor improved compare to baseline due to carbidopa-levodopa. Patient is currently on Dilantin for seizure treatment.

## 2017-07-20 NOTE — PROGRESS NOTE ADULT - ATTENDING COMMENTS
Agree with above. Extubated yesterday.  Episodes of dysautonomia a/w hyper- and hypotension.  On-and-off NIPPV.  Can transition to nasal cannula  Continue to monitor for airway issues and blood pressure for now.  Continue fosphenytoin for seizure treatment, anti-Parkinson meds.

## 2017-07-20 NOTE — PROGRESS NOTE ADULT - SUBJECTIVE AND OBJECTIVE BOX
CHIEF COMPLAINT: Epistaxis    Interval Events: No EEG activity overnight, off 24 EEG monitoring tracking with eyes, continues to have tremors, extubated , sating well on face mask     REVIEW OF SYSTEMS:  Constitutional: [ ] negative [ ] fevers [ ] chills [ ] weight loss [ ] weight gain  HEENT: [ ] negative [ ] dry eyes [ ] eye irritation [ ] postnasal drip [ ] nasal congestion  CV: [ ] negative  [ ] chest pain [ ] orthopnea [ ] palpitations [ ] murmur  Resp: [ ] negative [ ] cough [ ] shortness of breath [ ] dyspnea [ ] wheezing [ ] sputum [ ] hemoptysis  GI: [ ] negative [ ] nausea [ ] vomiting [ ] diarrhea [ ] constipation [ ] abd pain [ ] dysphagia   : [ ] negative [ ] dysuria [ ] nocturia [ ] hematuria [ ] increased urinary frequency  Musculoskeletal: [ ] negative [ ] back pain [ ] myalgias [ ] arthralgias [ ] fracture  Skin: [ ] negative [ ] rash [ ] itch  Neurological: [ ] negative [ ] headache [ ] dizziness [ ] syncope [ ] weakness [ ] numbness  Psychiatric: [ ] negative [ ] anxiety [ ] depression  Endocrine: [ ] negative [ ] diabetes [ ] thyroid problem  Hematologic/Lymphatic: [ ] negative [ ] anemia [ ] bleeding problem  Allergic/Immunologic: [ ] negative [ ] itchy eyes [ ] nasal discharge [ ] hives [ ] angioedema  [ ] All other systems negative  [X] Unable to assess ROS because pt is nonverbal on ventimask.    OBJECTIVE:  ICU Vital Signs Last 24 Hrs  T(C): 36.4 (20 Jul 2017 04:00), Max: 37.5 (19 Jul 2017 11:00)  T(F): 97.5 (20 Jul 2017 04:00), Max: 99.5 (19 Jul 2017 11:00)  HR: 84 (20 Jul 2017 07:00) (60 - 112)  BP: 135/96 (20 Jul 2017 07:00) (72/46 - 212/113)  BP(mean): 114 (20 Jul 2017 07:00) (55 - 151)  ABP: --  ABP(mean): --  RR: 32 (20 Jul 2017 07:00) (12 - 32)  SpO2: 100% (20 Jul 2017 07:00) (94% - 100%)    Mode: CPAP with PS, FiO2: 30, PEEP: 5, PS: 5, MAP: 7, PIP: 11    07-19 @ 07:01  -  07-20 @ 07:00  --------------------------------------------------------  IN: 915.1 mL / OUT: 0 mL / NET: 915.1 mL      CAPILLARY BLOOD GLUCOSE      PHYSICAL EXAM:  General: Calm, Breathing well in bed   HEENT: Eyes tracking.   Neck: No JVD  Respiratory: Clear lungs field b/l Some bronchial breath sounds   Cardiovascular: S1/S2 No murmurs  Abdomen: Firm, NT. ND  Extremities: Well perfused upper extremities. Cold lower extremity, no cyanosis however.  Neurological: Eyes tracking. Sedated with Precedex    LINES:    HOSPITAL MEDICATIONS:  heparin  Injectable 5000 Unit(s) SubCutaneous every 8 hours        levothyroxine 75 MICROGram(s) Oral daily      carbidopa/levodopa  25/100 2 Tablet(s) Oral every 4 hours  fosphenytoin IVPB 100 milliGRAM(s) PE IV Intermittent every 8 hours  dexmedetomidine Infusion 0.3 MICROgram(s)/kG/Hr IV Continuous <Continuous>  fentaNYL    Injectable 25 MICROGram(s) IV Push every 2 hours PRN  LORazepam   Injectable 1 milliGRAM(s) IV Push every 6 hours PRN    pantoprazole   Suspension 40 milliGRAM(s) Oral before breakfast            sodium chloride 0.65% Nasal 1 Spray(s) Both Nostrils three times a day  BACItracin   Ointment 1 Application(s) Topical two times a day  chlorhexidine 0.12% Liquid 15 milliLiter(s) Swish and Spit two times a day            LABS:                        10.4   5.9   )-----------( 208      ( 20 Jul 2017 04:35 )             31.7     Hgb Trend: 10.4<--, 10.1<--, 9.6<--, 10.5<--, 10.2<--  07-20    144  |  102  |  9   ----------------------------<  93  4.3   |  30  |  0.35<L>    Ca    9.3      20 Jul 2017 04:35  Phos  3.2     07-20  Mg     2.4     07-20    TPro  6.9  /  Alb  3.4  /  TBili  0.2  /  DBili  x   /  AST  23  /  ALT  5<L>  /  AlkPhos  83  07-20    Creatinine Trend: 0.35<--, 0.44<--, 0.42<--, 0.39<--, 0.45<--, 0.51<--  PT/INR - ( 20 Jul 2017 04:35 )   PT: 10.5 sec;   INR: 0.97 ratio         PTT - ( 20 Jul 2017 04:35 )  PTT:34.2 sec    Arterial Blood Gas:  07-20 @ 04:26  7.41/53/195/33/99/7.5  ABG lactate: --        MICROBIOLOGY:     RADIOLOGY:  [ ] Reviewed and interpreted by me    EKG: CHIEF COMPLAINT: Epistaxis    Interval Events: No EEG activity since performance of 24 hour EEG doing well on fosphenytoin , tremors, extubated , sating well on face mask , Tolerated BiPAP overnight Sating well on NC now. POC glucose Q6 as she is NPO, yesterday her blood pressure went down after labetalol and we believe she has dysautonomia in which her HR swings from highs to lows.      REVIEW OF SYSTEMS:  Constitutional: [ ] negative [ ] fevers [ ] chills [ ] weight loss [ ] weight gain  HEENT: [ ] negative [ ] dry eyes [ ] eye irritation [ ] postnasal drip [ ] nasal congestion  CV: [ ] negative  [ ] chest pain [ ] orthopnea [ ] palpitations [ ] murmur  Resp: [ ] negative [ ] cough [ ] shortness of breath [ ] dyspnea [ ] wheezing [ ] sputum [ ] hemoptysis  GI: [ ] negative [ ] nausea [ ] vomiting [ ] diarrhea [ ] constipation [ ] abd pain [ ] dysphagia   : [ ] negative [ ] dysuria [ ] nocturia [ ] hematuria [ ] increased urinary frequency  Musculoskeletal: [ ] negative [ ] back pain [ ] myalgias [ ] arthralgias [ ] fracture  Skin: [ ] negative [ ] rash [ ] itch  Neurological: [ ] negative [ ] headache [ ] dizziness [ ] syncope [ ] weakness [ ] numbness  Psychiatric: [ ] negative [ ] anxiety [ ] depression  Endocrine: [ ] negative [ ] diabetes [ ] thyroid problem  Hematologic/Lymphatic: [ ] negative [ ] anemia [ ] bleeding problem  Allergic/Immunologic: [ ] negative [ ] itchy eyes [ ] nasal discharge [ ] hives [ ] angioedema  [ ] All other systems negative  [X] Unable to assess ROS because pt is nonverbal on ventimask.    OBJECTIVE:  ICU Vital Signs Last 24 Hrs  T(C): 36.4 (20 Jul 2017 04:00), Max: 37.5 (19 Jul 2017 11:00)  T(F): 97.5 (20 Jul 2017 04:00), Max: 99.5 (19 Jul 2017 11:00)  HR: 84 (20 Jul 2017 07:00) (60 - 112)  BP: 135/96 (20 Jul 2017 07:00) (72/46 - 212/113)  BP(mean): 114 (20 Jul 2017 07:00) (55 - 151)  ABP: --  ABP(mean): --  RR: 32 (20 Jul 2017 07:00) (12 - 32)  SpO2: 100% (20 Jul 2017 07:00) (94% - 100%)    Mode: CPAP with PS, FiO2: 30, PEEP: 5, PS: 5, MAP: 7, PIP: 11    07-19 @ 07:01  -  07-20 @ 07:00  --------------------------------------------------------  IN: 915.1 mL / OUT: 0 mL / NET: 915.1 mL      CAPILLARY BLOOD GLUCOSE    PHYSICAL EXAM:  General: Calm, Breathing well in bed   HEENT: Eyes tracking.   Neck: No JVD  Respiratory: Clear lungs field b/l Some bronchial breath sounds   Cardiovascular: S1/S2 No murmurs  Abdomen: Firm, NT. ND  Extremities: Well perfused upper extremities. Cold lower extremity, no cyanosis however.  Neurological: Eyes tracking. no seizure like activity    LINES:    HOSPITAL MEDICATIONS:  heparin  Injectable 5000 Unit(s) SubCutaneous every 8 hours    levothyroxine 75 MICROGram(s) Oral daily  carbidopa/levodopa  25/100 2 Tablet(s) Oral every 4 hours  fosphenytoin IVPB 100 milliGRAM(s) PE IV Intermittent every 8 hours  dexmedetomidine Infusion 0.3 MICROgram(s)/kG/Hr IV Continuous <Continuous>  fentaNYL    Injectable 25 MICROGram(s) IV Push every 2 hours PRN  LORazepam   Injectable 1 milliGRAM(s) IV Push every 6 hours PRN    pantoprazole   Suspension 40 milliGRAM(s) Oral before breakfast    sodium chloride 0.65% Nasal 1 Spray(s) Both Nostrils three times a day  BACItracin   Ointment 1 Application(s) Topical two times a day  chlorhexidine 0.12% Liquid 15 milliLiter(s) Swish and Spit two times a day      LABS:                        10.4   5.9   )-----------( 208      ( 20 Jul 2017 04:35 )             31.7     Hgb Trend: 10.4<--, 10.1<--, 9.6<--, 10.5<--, 10.2<--  07-20    144  |  102  |  9   ----------------------------<  93  4.3   |  30  |  0.35<L>    Ca    9.3      20 Jul 2017 04:35  Phos  3.2     07-20  Mg     2.4     07-20    TPro  6.9  /  Alb  3.4  /  TBili  0.2  /  DBili  x   /  AST  23  /  ALT  5<L>  /  AlkPhos  83  07-20    Creatinine Trend: 0.35<--, 0.44<--, 0.42<--, 0.39<--, 0.45<--, 0.51<--  PT/INR - ( 20 Jul 2017 04:35 )   PT: 10.5 sec;   INR: 0.97 ratio         PTT - ( 20 Jul 2017 04:35 )  PTT:34.2 sec    Arterial Blood Gas:  07-20 @ 04:26  7.41/53/195/33/99/7.5  ABG lactate: --        MICROBIOLOGY:     RADIOLOGY:  [ ] Reviewed and interpreted by me    EKG:

## 2017-07-20 NOTE — PROGRESS NOTE ADULT - ASSESSMENT
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 Avoid benzos   -GOC: patient refused hospice placement in the past. Mother of the patient has been making medical decisions but has recently suffered a stroke.  -Neurology following, recommendations appreciated.    #Respiratory  Acute respiratory failure with hypoxia and hypercapnia 2/2 Epistaxis  - wean FiO2, keep Sat >92%  - Airway compromise 2/2 epistaxis- now resolved  - Extubated  - GI Prophylaxis: Protonix     #Cardiac  Troponin trending down, No acute concerns on EKG    #Heme  Epistaxis, recurrent.    - s/p Nasal packing removal, off ABX for packing prophylaxis  - Monitor CBC daily  -continue nasal saline b/l 3x/day, Bacitracin Ointment B/L 2x/day    #Endo  Hypothyroidism    - Continue synthroid 75 mcg/daily  - Check TSH level.     #Prophylactic  Plan: HSQ. 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 Avoid benzos   -GOC: patient refused hospice placement in the past. Mother of the patient has been making medical decisions but has recently suffered a stroke.  -Neurology following, recommendations appreciated.    #Respiratory  Acute respiratory failure with hypoxia and hypercapnia 2/2 Epistaxis  - wean FiO2, keep Sat >92%  - Airway compromise 2/2 epistaxis- now resolved  - Extubated  - GI Prophylaxis: Protonix     #Cardiac  Troponin trending down, No acute concerns on EKG    #Heme  Epistaxis, recurrent.    - s/p Nasal packing removal, off ABX for packing prophylaxis  - Monitor CBC daily  -continue nasal saline b/l 3x/day, Bacitracin Ointment B/L 2x/day    #Endo  Hypothyroidism    - Continue synthroid 75 mcg/daily  - Check TSH level.     #Prophylactic  Plan: HSQ.

## 2017-07-21 DIAGNOSIS — G40.901 EPILEPSY, UNSPECIFIED, NOT INTRACTABLE, WITH STATUS EPILEPTICUS: ICD-10-CM

## 2017-07-21 LAB
ALBUMIN SERPL ELPH-MCNC: 3.4 G/DL — SIGNIFICANT CHANGE UP (ref 3.3–5)
ALP SERPL-CCNC: 83 U/L — SIGNIFICANT CHANGE UP (ref 40–120)
ALT FLD-CCNC: 5 U/L RC — LOW (ref 10–45)
ANION GAP SERPL CALC-SCNC: 12 MMOL/L — SIGNIFICANT CHANGE UP (ref 5–17)
APTT BLD: 46.4 SEC — HIGH (ref 27.5–37.4)
AST SERPL-CCNC: 25 U/L — SIGNIFICANT CHANGE UP (ref 10–40)
BASOPHILS # BLD AUTO: 0 K/UL — SIGNIFICANT CHANGE UP (ref 0–0.2)
BASOPHILS NFR BLD AUTO: 0.4 % — SIGNIFICANT CHANGE UP (ref 0–2)
BILIRUB SERPL-MCNC: 0.2 MG/DL — SIGNIFICANT CHANGE UP (ref 0.2–1.2)
BUN SERPL-MCNC: 18 MG/DL — SIGNIFICANT CHANGE UP (ref 7–23)
CALCIUM SERPL-MCNC: 9.5 MG/DL — SIGNIFICANT CHANGE UP (ref 8.4–10.5)
CHLORIDE SERPL-SCNC: 101 MMOL/L — SIGNIFICANT CHANGE UP (ref 96–108)
CO2 SERPL-SCNC: 32 MMOL/L — HIGH (ref 22–31)
CREAT SERPL-MCNC: 0.51 MG/DL — SIGNIFICANT CHANGE UP (ref 0.5–1.3)
EOSINOPHIL # BLD AUTO: 0.2 K/UL — SIGNIFICANT CHANGE UP (ref 0–0.5)
EOSINOPHIL NFR BLD AUTO: 1.6 % — SIGNIFICANT CHANGE UP (ref 0–6)
GAS PNL BLDA: SIGNIFICANT CHANGE UP
GLUCOSE SERPL-MCNC: 118 MG/DL — HIGH (ref 70–99)
HCT VFR BLD CALC: 35.2 % — SIGNIFICANT CHANGE UP (ref 34.5–45)
HGB BLD-MCNC: 10.6 G/DL — LOW (ref 11.5–15.5)
INR BLD: 1.04 RATIO — SIGNIFICANT CHANGE UP (ref 0.88–1.16)
LYMPHOCYTES # BLD AUTO: 0.7 K/UL — LOW (ref 1–3.3)
LYMPHOCYTES # BLD AUTO: 6.2 % — LOW (ref 13–44)
MAGNESIUM SERPL-MCNC: 2.3 MG/DL — SIGNIFICANT CHANGE UP (ref 1.6–2.6)
MCHC RBC-ENTMCNC: 30.1 GM/DL — LOW (ref 32–36)
MCHC RBC-ENTMCNC: 30.4 PG — SIGNIFICANT CHANGE UP (ref 27–34)
MCV RBC AUTO: 101 FL — HIGH (ref 80–100)
MONOCYTES # BLD AUTO: 0.8 K/UL — SIGNIFICANT CHANGE UP (ref 0–0.9)
MONOCYTES NFR BLD AUTO: 6.5 % — SIGNIFICANT CHANGE UP (ref 2–14)
NEUTROPHILS # BLD AUTO: 10.4 K/UL — HIGH (ref 1.8–7.4)
NEUTROPHILS NFR BLD AUTO: 85.3 % — HIGH (ref 43–77)
PHOSPHATE SERPL-MCNC: 3.8 MG/DL — SIGNIFICANT CHANGE UP (ref 2.5–4.5)
PLATELET # BLD AUTO: 244 K/UL — SIGNIFICANT CHANGE UP (ref 150–400)
POTASSIUM SERPL-MCNC: 4.2 MMOL/L — SIGNIFICANT CHANGE UP (ref 3.5–5.3)
POTASSIUM SERPL-SCNC: 4.2 MMOL/L — SIGNIFICANT CHANGE UP (ref 3.5–5.3)
PROT SERPL-MCNC: 6.8 G/DL — SIGNIFICANT CHANGE UP (ref 6–8.3)
PROTHROM AB SERPL-ACNC: 11.3 SEC — SIGNIFICANT CHANGE UP (ref 9.8–12.7)
RBC # BLD: 3.47 M/UL — LOW (ref 3.8–5.2)
RBC # FLD: 13 % — SIGNIFICANT CHANGE UP (ref 10.3–14.5)
SODIUM SERPL-SCNC: 145 MMOL/L — SIGNIFICANT CHANGE UP (ref 135–145)
WBC # BLD: 12.1 K/UL — HIGH (ref 3.8–10.5)
WBC # FLD AUTO: 12.1 K/UL — HIGH (ref 3.8–10.5)

## 2017-07-21 PROCEDURE — 99233 SBSQ HOSP IP/OBS HIGH 50: CPT | Mod: GC

## 2017-07-21 RX ORDER — CARBIDOPA AND LEVODOPA 25; 100 MG/1; MG/1
2 TABLET ORAL EVERY 4 HOURS
Qty: 0 | Refills: 0 | Status: DISCONTINUED | OUTPATIENT
Start: 2017-07-21 | End: 2017-07-23

## 2017-07-21 RX ADMIN — CARBIDOPA AND LEVODOPA 1 TABLET(S): 25; 100 TABLET ORAL at 05:41

## 2017-07-21 RX ADMIN — FOSPHENYTOIN 104 MILLIGRAM(S) PE: 50 INJECTION INTRAMUSCULAR; INTRAVENOUS at 13:57

## 2017-07-21 RX ADMIN — HEPARIN SODIUM 5000 UNIT(S): 5000 INJECTION INTRAVENOUS; SUBCUTANEOUS at 13:58

## 2017-07-21 RX ADMIN — Medication 1 MILLIGRAM(S): at 00:44

## 2017-07-21 RX ADMIN — CARBIDOPA AND LEVODOPA 1 TABLET(S): 25; 100 TABLET ORAL at 02:03

## 2017-07-21 RX ADMIN — Medication 1 SPRAY(S): at 14:02

## 2017-07-21 RX ADMIN — HEPARIN SODIUM 5000 UNIT(S): 5000 INJECTION INTRAVENOUS; SUBCUTANEOUS at 05:41

## 2017-07-21 RX ADMIN — PANTOPRAZOLE SODIUM 40 MILLIGRAM(S): 20 TABLET, DELAYED RELEASE ORAL at 09:36

## 2017-07-21 RX ADMIN — HEPARIN SODIUM 5000 UNIT(S): 5000 INJECTION INTRAVENOUS; SUBCUTANEOUS at 22:27

## 2017-07-21 RX ADMIN — CARBIDOPA AND LEVODOPA 1 TABLET(S): 25; 100 TABLET ORAL at 14:03

## 2017-07-21 RX ADMIN — Medication 1 APPLICATION(S): at 17:16

## 2017-07-21 RX ADMIN — FOSPHENYTOIN 104 MILLIGRAM(S) PE: 50 INJECTION INTRAMUSCULAR; INTRAVENOUS at 05:41

## 2017-07-21 RX ADMIN — Medication 75 MICROGRAM(S): at 05:41

## 2017-07-21 RX ADMIN — CARBIDOPA AND LEVODOPA 2 TABLET(S): 25; 100 TABLET ORAL at 18:11

## 2017-07-21 RX ADMIN — CARBIDOPA AND LEVODOPA 2 TABLET(S): 25; 100 TABLET ORAL at 22:29

## 2017-07-21 RX ADMIN — Medication 300 MILLIGRAM(S): at 22:27

## 2017-07-21 RX ADMIN — Medication 1 SPRAY(S): at 22:29

## 2017-07-21 RX ADMIN — CARBIDOPA AND LEVODOPA 1 TABLET(S): 25; 100 TABLET ORAL at 09:29

## 2017-07-21 RX ADMIN — Medication 1 APPLICATION(S): at 05:41

## 2017-07-21 RX ADMIN — Medication 1 SPRAY(S): at 05:41

## 2017-07-21 NOTE — PROGRESS NOTE ADULT - PROBLEM SELECTOR PLAN 1
- resolved  - Patient was previously intubated in the MICU due to inability to protect airway from epistaxis  - patient has been saturating well on NC

## 2017-07-21 NOTE — PROGRESS NOTE ADULT - ASSESSMENT
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 Patient was transferred to medical floors on 7/21/17 and had been medically stable.

## 2017-07-21 NOTE — PROGRESS NOTE ADULT - PROBLEM SELECTOR PLAN 3
- resolved  patient previously had EEG showing non-convulsive status epilepticus  - most recent EEG's have been negative for seizures  - patient is currently on IV fosphenytoin 100mg q8 hours  - spoke with Neurology team about transitioning patient to PO antiepileptics- f/u recommendations

## 2017-07-21 NOTE — PROGRESS NOTE ADULT - SUBJECTIVE AND OBJECTIVE BOX
Subjective: Patient is non verbal.         VITAL SIGNS:  Vital Signs Last 24 Hrs  T(C): 36.3 (21 Jul 2017 13:00), Max: 37.7 (21 Jul 2017 00:00)  T(F): 97.3 (21 Jul 2017 13:00), Max: 99.9 (21 Jul 2017 00:00)  HR: 67 (21 Jul 2017 13:00) (67 - 105)  BP: 116/66 (21 Jul 2017 13:00) (104/54 - 181/142)  BP(mean): 96 (21 Jul 2017 06:00) (74 - 157)  RR: 20 (21 Jul 2017 13:00) (20 - 37)  SpO2: 98% (21 Jul 2017 13:00) (97% - 100%)      PHYSICAL EXAM:     GENERAL: not in acute distress  RESPIRATORY: CTAB, no w/c   CARDIOVASCULAR: RRR, no murmurs, gallops, rubs  ABDOMINAL: soft, non-tender, (+) PEG  EXTREMITIES: no clubbing, cyanosis, or edema  NEUROLOGICAL: patient is non verbal with resting tremors in both arms  MUSCULOSKELETAL: no gross joint deformity                          10.6   12.1  )-----------( 244      ( 21 Jul 2017 02:48 )             35.2     07-21    145  |  101  |  18  ----------------------------<  118<H>  4.2   |  32<H>  |  0.51    Ca    9.5      21 Jul 2017 02:48  Phos  3.8     07-21  Mg     2.3     07-21    TPro  6.8  /  Alb  3.4  /  TBili  0.2  /  DBili  x   /  AST  25  /  ALT  5<L>  /  AlkPhos  83  07-21      CAPILLARY BLOOD GLUCOSE  134 (21 Jul 2017 06:00)  115 (21 Jul 2017 03:00)  102 (21 Jul 2017 00:00)  106 (20 Jul 2017 17:00)          MEDICATIONS  (STANDING):  heparin  Injectable 5000 Unit(s) SubCutaneous every 8 hours  levothyroxine 75 MICROGram(s) Oral daily  pantoprazole   Suspension 40 milliGRAM(s) Oral before breakfast  fosphenytoin IVPB 100 milliGRAM(s) PE IV Intermittent every 8 hours  sodium chloride 0.65% Nasal 1 Spray(s) Both Nostrils three times a day  BACItracin   Ointment 1 Application(s) Topical two times a day  carbidopa/levodopa  25/250 1 Tablet(s) Oral every 4 hours    MEDICATIONS  (PRN):  LORazepam   Injectable 1 milliGRAM(s) IV Push every 6 hours PRN Agitation

## 2017-07-21 NOTE — CHART NOTE - NSCHARTNOTEFT_GEN_A_CORE
MICU Transfer Note    Transfer from: MICU    Transfer to: (  ) Medicine    (  ) Telemetry     (   ) RCU        (    ) Palliative         (   ) Stroke Unit          (   ) __________________    Accepting Physician:  Signout given to:     MICU COURSE:  63yo F with cognitive impairment, TBI c/b ICH, Drug-induced Parkinson's Disease (neuroleptic use), nonverbal at baseline, s/p PEG placement for aspiration, Depression, Schizophrenia, Hypothyroidism, GERD, BIBEMS for evaluation of epistaxis. Patient intubated in ED for hypoxia and hypercapnia    7/13: Pt intubated in the ED for hypoxia & Hypercapnia. ABG pending. CXR clear lung. CBC stable. Awaiting ENT for Epistaxis- leave packing in for 3 daysancef 1 gm q 8 hr, to infuse for 3 days only  7/14: Concerned that patient had seizure overnight and this am. Gave 2mg ativan, EEG ordered, called Neuro for consult/recs. Broadened abx to Vanc/Cefepime. Trop/wbc in setting of seizures EEG showed nonconvulsive status, loaded fosphenytoin and started on versed drip, F/u CT Head.  7/15:  No seizures overnight, CTH negative. Weaning off levophed. will continue versed at current dose adn reasses tomorrow.   7/16:  fosephenytoin level 11, titrate down on versed slowly down to 3mg/hr today bring down to 2mg/hr tomorrow. No seizures overnight. ENT removed nasal packing, d/c abx   7/16 o/n: versed titrated down 3 to 2mg/hr. Plan for weaning exercises. Off levo.   7/17: versed hold. plan to c/w weaning, precedex as needed for agitation.drop RR to 12  7/18: Overnight- stopped tracking during a jerking movement episode - f/u w neuro to see if it was seizure and need to alter anti- seizure meds. C/w precedex, & fosphenytoin, PRN fentanyl for agitation. VEEG d/c; observe clinically for seuizure activity. SBT and poss extubation tmrw  7/19: added ativan prn for agitation. Holding tubes feeds for possible extubation this am. Start CPAP, patient was extubated and placed on ventimask, Pt.then had inc HR and desating, racemic epi, duonebs, 40mg of solumedrol, and put on BIPAP.  7/20: sating well on NC 4L, monitoring for any deterioration and possible bedboard, no more epistaxis         ASSESSMENT & PLAN:             FOR FOLLOW UP: MICU Transfer Note    Transfer from: MICU    Transfer to: (  ) Medicine    (  ) Telemetry     (   ) RCU        (    ) Palliative         (   ) Stroke Unit          (   ) __________________    Accepting Physician:  Signout given to:     MICU COURSE:  61yo F with cognitive impairment, TBI c/b ICH, Drug-induced Parkinson's Disease (neuroleptic use), nonverbal at baseline, s/p PEG placement for aspiration, Depression, Schizophrenia, Hypothyroidism, GERD, BIBEMS for evaluation of epistaxis. Patient intubated in ED for hypoxia and hypercapnia and nasal packing. EEG started due to concern for seizure-like activity (eye rolling, patient has baseline tremors 2/2 neuroleptic-induced seizures), called Neuro for consult/recs. Broadened abx to Vanc/Cefepime. Trop/wbc in setting of seizures EEG showed nonconvulsive status, loaded fosphenytoin and started on versed drip, F/u CT Head.  7/15:  No seizures overnight, CTH negative. Weaning off levophed. will continue versed at current dose adn reasses tomorrow.   7/16:  fosephenytoin level 11, titrate down on versed slowly down to 3mg/hr today bring down to 2mg/hr tomorrow. No seizures overnight. ENT removed nasal packing, d/c abx   7/16 o/n: versed titrated down 3 to 2mg/hr. Plan for weaning exercises. Off levo.   7/17: versed hold. plan to c/w weaning, precedex as needed for agitation.drop RR to 12  7/18: Overnight- stopped tracking during a jerking movement episode - f/u w neuro to see if it was seizure and need to alter anti- seizure meds. C/w precedex, & fosphenytoin, PRN fentanyl for agitation. VEEG d/c; observe clinically for seuizure activity. SBT and poss extubation tmrw  7/19: added ativan prn for agitation. Holding tubes feeds for possible extubation this am. Start CPAP, patient was extubated and placed on ventimask, Pt.then had inc HR and desating, racemic epi, duonebs, 40mg of solumedrol, and put on BIPAP.  7/20: sating well on NC 4L, monitoring for any deterioration and possible bedboard, no more epistaxis       ASSESSMENT & PLAN:             FOR FOLLOW UP: MICU Transfer Note    Transfer from: MICU    Transfer to: (  ) Medicine    (  ) Telemetry     (   ) RCU        (    ) Palliative         (   ) Stroke Unit          (   ) __________________    Accepting Physician:  Signout given to:     MICU COURSE:  63yo F with cognitive impairment, TBI c/b ICH, Drug-induced Parkinson's Disease (neuroleptic use), nonverbal at baseline, s/p PEG placement for aspiration, Depression, Schizophrenia, Hypothyroidism, GERD, BIBEMS for evaluation of epistaxis. Patient intubated in ED for hypoxia and hypercapnia and received nasal packing. Due to concern of seizure-like activity (eye rolling, patient has baseline tremors 2/2 neuroleptic-induced seizures), EEG started and Neuro was consulted. Patient was loaded with fosphenytoin and started on versed drip. Patient was started on broad spectrum antibiotic coverage with Vanc/Cefepime in context of elevated WBC.      7/15:  No seizures overnight, CTH negative. Weaning off levophed. will continue versed at current dose adn reasses tomorrow.   7/16:  fosephenytoin level 11, titrate down on versed slowly down to 3mg/hr today bring down to 2mg/hr tomorrow. No seizures overnight. ENT removed nasal packing, d/c abx   7/16 o/n: versed titrated down 3 to 2mg/hr. Plan for weaning exercises. Off levo.   7/17: versed hold. plan to c/w weaning, precedex as needed for agitation.drop RR to 12  7/18: Overnight- stopped tracking during a jerking movement episode - f/u w neuro to see if it was seizure and need to alter anti- seizure meds. C/w precedex, & fosphenytoin, PRN fentanyl for agitation. VEEG d/c; observe clinically for seuizure activity. SBT and poss extubation tmrw  7/19: added ativan prn for agitation. Holding tubes feeds for possible extubation this am. Start CPAP, patient was extubated and placed on ventimask, Pt.then had inc HR and desating, racemic epi, duonebs, 40mg of solumedrol, and put on BIPAP.  7/20: sating well on NC 4L, monitoring for any deterioration and possible bedboard, no more epistaxis       ASSESSMENT & PLAN:             FOR FOLLOW UP: MICU Transfer Note    Transfer from: MICU    Transfer to: (  ) Medicine    (  ) Telemetry     (   ) RCU        (    ) Palliative         (   ) Stroke Unit          (   ) __________________    Accepting Physician:  Signout given to:     MICU COURSE:  63yo F with cognitive impairment, TBI c/b ICH, Drug-induced Parkinson's Disease (neuroleptic use), nonverbal at baseline, s/p PEG placement for aspiration, Depression, Schizophrenia, Hypothyroidism, GERD, BIBEMS for evaluation of epistaxis. Patient intubated in ED for hypoxia and hypercapnia and received nasal packing. Due to concern of seizure-like activity (eye rolling, patient has baseline tremors 2/2 neuroleptic-induced seizures), EEG started and Neuro was consulted. Patient was loaded with fosphenytoin and started on versed drip. Patient was started on broad spectrum antibiotic coverage with Vanc/Cefepime in context of elevated WBC.      No seizures overnight, CTH negative. Weaning off levophed. will continue versed at current dose adn reasses tomorrow.   7/16:  fosephenytoin level 11, titrate down on versed slowly down to 3mg/hr today bring down to 2mg/hr tomorrow. No seizures overnight. ENT removed nasal packing, d/c abx   7/16 o/n: versed titrated down 3 to 2mg/hr. Plan for weaning exercises. Off levo.   7/17: versed hold. plan to c/w weaning, precedex as needed for agitation.drop RR to 12  7/18: Overnight- stopped tracking during a jerking movement episode - f/u w neuro to see if it was seizure and need to alter anti- seizure meds. C/w precedex, & fosphenytoin, PRN fentanyl for agitation. VEEG d/c; observe clinically for seuizure activity. SBT and poss extubation tmrw  7/19: added ativan prn for agitation. Holding tubes feeds for possible extubation this am. Start CPAP, patient was extubated and placed on ventimask, Pt.then had inc HR and desating, racemic epi, duonebs, 40mg of solumedrol, and put on BIPAP.  7/20: sating well on NC 4L, monitoring for any deterioration and possible bedboard, no more epistaxis       ASSESSMENT & PLAN:             FOR FOLLOW UP: MICU Transfer Note    Transfer from: MICU    Transfer to: (  ) Medicine    (  ) Telemetry     (   ) RCU        (    ) Palliative         (   ) Stroke Unit          (   ) __________________    Accepting Physician:  Signout given to:     MICU COURSE:    62 -year-old F with cognitive impairment, TBI c/b ICH, Drug-induced Parkinson's Disease (neuroleptic use) s/p PEG placement for aspiration, Depression, Schizophrenia, Hypothyroidism, GERD, who was admitted to the MICU s/p intubation in the ED for respiratory distress in the setting of epistaxis and failure to protect airway. Patient intubated in ED for hypoxia and hypercapnia and received nasal packing (now removed, no further episodes of epistaxis). Due to concern of seizure-like activity (eye rolling, patient has baseline tremors 2/2 neuroleptic-induced seizures), EEG started and Neuro was consulted. Patient was loaded with fosphenytoin and started on versed drip. vEEG was discontinued on the 18th, as EEG remained negative for seizure activity. Patient was started on broad spectrum antibiotic coverage with Vanc/Cefepime in context of elevated WBC. EEG was negative for seizure activity. CTH was also negative for any acute intracranial pathology. Patient was also placed on levophed for soft pressures, has been weaned off since the 16th (5 days). Versed weaned off on the 17th (4 days). Patient was also placed on precedex drip as needed for agitation but has not required this since...SBT and poss extubation tmrw. Has been on Ativan prn for agitation. Patient has been extubated on the 19th and was placed on ventimask intially. Her post-extubation course was complicated by tachycardia and desaturation, so she was given racemic epi, duonebs, 40mg of solumedrol, and put on BIPAP. She has since been transitioned to 4L NC with good saturation.       ASSESSMENT & PLAN:     62 -year-old F with cognitive impairment, TBI c/b ICH, Drug-induced Parkinson's Disease (neuroleptic use) s/p PEG placement for aspiration, Depression, Schizophrenia, Hypothyroidism, GERD, evaluated for epistaxis, had respiratory distress in the setting of epistaxis and failure to protect airway, admitted to MICU    #Neuro  Drug induced Parkinsonism: Continue sinemet  -Extubated, NPO , resting on NC  -Nonconvulsive status epilepticus on spot EEG, no further seizures on vEEG.  -C/w fosphenytoin,   - Ativan, fentanyl PRN for agitation. Avoid benzos   -GOC: patient refused hospice placement in the past. Mother of the patient has been making medical decisions but has recently suffered a stroke.  -Neurology following, recommendations appreciated.    #Respiratory  Acute respiratory failure with hypoxia and hypercapnia 2/2 Epistaxis  - wean FiO2, keep Sat >92%  - Airway compromise 2/2 epistaxis- now resolved  - Extubated  - GI Prophylaxis: Protonix     #Cardiac  Troponin trending down, No acute concerns on EKG    #Heme  Epistaxis, recurrent.    - s/p Nasal packing removal, off ABX for packing prophylaxis  - Monitor CBC daily  -continue nasal saline b/l 3x/day, Bacitracin Ointment B/L 2x/day    #Endo  Hypothyroidism    - Continue synthroid 75 mcg/daily  - Check TSH level.     #Prophylactic  Plan: HSQ.             FOR FOLLOW UP: MICU Transfer Note    Transfer from: MICU    Transfer to: (  ) Medicine    (  ) Telemetry     (   ) RCU        (    ) Palliative         (   ) Stroke Unit          (   ) __________________    Accepting Physician: Dr. Arceo  Signout given to:     MICU COURSE:    62 -year-old F with cognitive impairment, TBI c/b ICH, Drug-induced Parkinson's Disease (neuroleptic use) s/p PEG placement for aspiration, Depression, Schizophrenia, Hypothyroidism, GERD, who was admitted to the MICU s/p intubation in the ED for respiratory distress in the setting of epistaxis and failure to protect airway. Patient intubated in ED for hypoxia and hypercapnia and received nasal packing (now removed, no further episodes of epistaxis). Spot EEG was ordered due to questionable seizure activity in context of hx of TBI, which showed nonconvulsive status epilepticus. Patient was loaded with fosphenytoin and started on versed drip. Due to concern of seizure-like activity (eye rolling, patient has baseline tremors 2/2 neuroleptic-induced seizures), vEEG was started and Neuro was consulted. vEEG was discontinued on the 18th, as EEG remained negative for seizure activity. CTH was also negative for any acute intracranial pathology. Patient was started on broad spectrum antibiotic coverage with Vanc/Cefepime in context of elevated WBC, which has since been discontinued. Patient was also placed on levophed for soft pressures, which has been weaned off since the 16th (5 days now) and has been hemodynamically stable. Patient was also placed on precedex drip as needed for agitation but has been discontinued since yesterday (7/20), as patient has been adequately managed with Ativan prn. Patient has been extubated since the 19th. Her post-extubation course was complicated by tachycardia and desaturation, which has resolved s/p racemic epi, duonebs, 40mg of solumedrol, and put on BIPAP. She has since been transitioned to 4L NC with good saturation.     ASSESSMENT & PLAN:     62 -year-old F with cognitive impairment, TBI c/b ICH, Drug-induced Parkinson's Disease (neuroleptic use) s/p PEG placement for aspiration, Depression, Schizophrenia, Hypothyroidism, GERD, evaluated for epistaxis, had respiratory distress in the setting of epistaxis and failure to protect airway, admitted to MICU s/p intubation in ED.     #Neuro  -Continue sinemet for neuroleptic-induced parkinsonism  -Nonconvulsive status epilepticus on spot EEG, no further seizures on vEEG.  -C/w fosphenytoin,   - Ativan, fentanyl PRN for agitation. Avoid benzos   -GOC: patient refused hospice placement in the past. Mother of the patient has been making medical decisions but has recently suffered a stroke.  -Neurology following, recommendations appreciated.    #Respiratory  -extubated, saturating well on NC.     #Cardiac  Troponin trending down, No acute concerns on EKG    #GI  -NPO.  -no acute issues.     #Heme  - epistaxis resolved, nasal packing removal  - Monitor CBC daily  -continue nasal saline b/l 3x/day, Bacitracin Ointment B/L 2x/day    #ID   -was on Abx ppx for nasal packing. Discontinued.     #Endo  Hypothyroidism    - Continue synthroid 75 mcg/daily  - Check TSH level.     #Prophylactic  Plan: HSQ.             FOR FOLLOW UP: MICU Transfer Note    Transfer from: MICU    Transfer to: (x) Medicine    (  ) Telemetry     (   ) RCU        (    ) Palliative         (   ) Stroke Unit          (   ) __________________    Accepting Physician: Dr. Arceo  Signout given to:     MICU COURSE:    62 -year-old F with cognitive impairment, TBI c/b ICH, Drug-induced Parkinson's Disease (neuroleptic use) s/p PEG placement for aspiration, Depression, Schizophrenia, Hypothyroidism, GERD, who was admitted to the MICU s/p intubation in the ED for respiratory distress in the setting of epistaxis and failure to protect airway. Patient intubated in ED for hypoxia and hypercapnia and received nasal packing (now removed, no further episodes of epistaxis). Patient was started on nasal packing infection prophylaxis with vanc/cefepime, which has since been discontinued. Spot EEG was ordered due to questionable seizure activity in context of hx of TBI, which showed nonconvulsive status epilepticus. Patient was loaded with fosphenytoin and started on versed drip. vEEG was started due to continued concern for seizure-like activity (eye-rolling, patient has baseline tremors 2/2 neuroleptic-induced parkinsonism) and Neuro was consulted. vEEG was discontinued on the 18th, as EEG remained negative for seizure activity. CTH was also negative for any acute intracranial pathology. Patient was also placed on levophed for soft pressures, which has been weaned off since the 16th (5 days now) and has been hemodynamically stable. Patient was also placed on precedex drip as needed for agitation but has been discontinued since yesterday (7/20), as patient has been adequately managed with Ativan prn. Patient has been extubated since the 19th. Her post-extubation course was complicated by tachycardia and desaturation, which has resolved s/p racemic epi, duonebs, 40mg of solumedrol, and she was initially placed on BIPAP. She has since been transitioned to 4L NC with good saturation. Patient remains hemodynamically stable.     ASSESSMENT & PLAN:     62 -year-old F with cognitive impairment (nonverbal baseline), TBI c/b ICH, Drug-induced Parkinson's Disease (neuroleptic use) s/p PEG placement for aspiration, Depression, Schizophrenia, Hypothyroidism p/w respiratory distress in the setting of epistaxis and failure to protect airway. Admitted to MICU s/p intubation in ED.     #Neuro  -Continue sinemet for neuroleptic-induced parkinsonism  -Nonconvulsive status epilepticus on spot EEG, no further seizures on vEEG.  -C/w fosphenytoin,   - Ativan, fentanyl PRN for agitation. Avoid benzos   -GOC: patient refused hospice placement in the past. Mother of the patient has been making medical decisions but has recently suffered a stroke.  -Neurology following, recommendations appreciated.    #Respiratory  -extubated, saturating well on NC.   -s/p racemic epi, solumderol.     #Cardiac  -elevated troponin likely 2/2 seizure activity. Troponin trending down.  - No acute concerns on EKG    #GI  -NPO.  -no acute issues.     #Heme  - epistaxis resolved, nasal packing removal.  - Monitor CBC daily. H/H stable.   -continue nasal saline b/l 3x/day, Bacitracin Ointment B/L 2x/day.    #ID   -was on Abx ppx for nasal packing. Discontinued.   -increased WBC to 12 tonight, likely 2/2 steroid use s/p extubation.     #Endo  Hypothyroidism    - Continue synthroid 75 mcg/daily  - Check TSH level.     #Prophylactic  Plan: HSQ.     FOR FOLLOW UP:  -continue monitoring hemodynamic and respiratory status.  -continue with phosphenytoin for seizure ppx.   -continue with cavidopa/levidopa for tremors.  -monitor for seizure activity: at baseline, patient is nonverbal but tracks. Patient also has baseline tremors due to neuroleptic induced parkinsonism. Monitor for eye rolling and lack of tracking.

## 2017-07-21 NOTE — CHART NOTE - NSCHARTNOTEFT_GEN_A_CORE
OK to change fosphenytoin to dilantin  mg qhs and sinemet 25/250 to 25/100 2 tabs q4h during waking hours.

## 2017-07-22 DIAGNOSIS — R30.0 DYSURIA: ICD-10-CM

## 2017-07-22 LAB
APPEARANCE UR: ABNORMAL
BILIRUB UR-MCNC: NEGATIVE — SIGNIFICANT CHANGE UP
BLD GP AB SCN SERPL QL: NEGATIVE — SIGNIFICANT CHANGE UP
COLOR SPEC: YELLOW — SIGNIFICANT CHANGE UP
DIFF PNL FLD: NEGATIVE — SIGNIFICANT CHANGE UP
FERRITIN SERPL-MCNC: 77 NG/ML — SIGNIFICANT CHANGE UP (ref 15–150)
GLUCOSE UR QL: NEGATIVE — SIGNIFICANT CHANGE UP
IRON SATN MFR SERPL: 27 % — SIGNIFICANT CHANGE UP (ref 14–50)
IRON SATN MFR SERPL: 52 UG/DL — SIGNIFICANT CHANGE UP (ref 30–160)
KETONES UR-MCNC: NEGATIVE — SIGNIFICANT CHANGE UP
LEUKOCYTE ESTERASE UR-ACNC: NEGATIVE — SIGNIFICANT CHANGE UP
NITRITE UR-MCNC: NEGATIVE — SIGNIFICANT CHANGE UP
PH UR: 7 — SIGNIFICANT CHANGE UP (ref 5–8)
PHENYTOIN FREE SERPL-MCNC: 8 UG/ML — LOW (ref 10–20)
PROT UR-MCNC: NEGATIVE — SIGNIFICANT CHANGE UP
RH IG SCN BLD-IMP: NEGATIVE — SIGNIFICANT CHANGE UP
SP GR SPEC: 1.01 — SIGNIFICANT CHANGE UP (ref 1.01–1.02)
TIBC SERPL-MCNC: 194 UG/DL — LOW (ref 220–430)
UIBC SERPL-MCNC: 142 UG/DL — SIGNIFICANT CHANGE UP (ref 110–370)
UROBILINOGEN FLD QL: NEGATIVE — SIGNIFICANT CHANGE UP
VIT B12 SERPL-MCNC: 1452 PG/ML — HIGH (ref 243–894)

## 2017-07-22 PROCEDURE — 99231 SBSQ HOSP IP/OBS SF/LOW 25: CPT

## 2017-07-22 PROCEDURE — 71010: CPT | Mod: 26

## 2017-07-22 PROCEDURE — 99233 SBSQ HOSP IP/OBS HIGH 50: CPT

## 2017-07-22 RX ADMIN — Medication 300 MILLIGRAM(S): at 22:51

## 2017-07-22 RX ADMIN — CARBIDOPA AND LEVODOPA 2 TABLET(S): 25; 100 TABLET ORAL at 10:38

## 2017-07-22 RX ADMIN — Medication 1 APPLICATION(S): at 06:32

## 2017-07-22 RX ADMIN — CARBIDOPA AND LEVODOPA 2 TABLET(S): 25; 100 TABLET ORAL at 06:35

## 2017-07-22 RX ADMIN — Medication 1 SPRAY(S): at 22:51

## 2017-07-22 RX ADMIN — CARBIDOPA AND LEVODOPA 2 TABLET(S): 25; 100 TABLET ORAL at 22:50

## 2017-07-22 RX ADMIN — CARBIDOPA AND LEVODOPA 2 TABLET(S): 25; 100 TABLET ORAL at 01:29

## 2017-07-22 RX ADMIN — HEPARIN SODIUM 5000 UNIT(S): 5000 INJECTION INTRAVENOUS; SUBCUTANEOUS at 13:24

## 2017-07-22 RX ADMIN — CARBIDOPA AND LEVODOPA 2 TABLET(S): 25; 100 TABLET ORAL at 18:28

## 2017-07-22 RX ADMIN — Medication 1 APPLICATION(S): at 18:28

## 2017-07-22 RX ADMIN — HEPARIN SODIUM 5000 UNIT(S): 5000 INJECTION INTRAVENOUS; SUBCUTANEOUS at 22:50

## 2017-07-22 RX ADMIN — Medication 1 SPRAY(S): at 13:25

## 2017-07-22 RX ADMIN — CARBIDOPA AND LEVODOPA 2 TABLET(S): 25; 100 TABLET ORAL at 13:25

## 2017-07-22 RX ADMIN — Medication 1 MILLIGRAM(S): at 13:24

## 2017-07-22 RX ADMIN — PANTOPRAZOLE SODIUM 40 MILLIGRAM(S): 20 TABLET, DELAYED RELEASE ORAL at 10:38

## 2017-07-22 RX ADMIN — Medication 1 SPRAY(S): at 06:35

## 2017-07-22 RX ADMIN — HEPARIN SODIUM 5000 UNIT(S): 5000 INJECTION INTRAVENOUS; SUBCUTANEOUS at 06:32

## 2017-07-22 RX ADMIN — Medication 75 MICROGRAM(S): at 06:32

## 2017-07-22 RX ADMIN — Medication 300 MILLIGRAM(S): at 14:36

## 2017-07-22 NOTE — PROGRESS NOTE ADULT - PROBLEM SELECTOR PLAN 1
continue levodopa/carbidopa 25/100mg 2 tabs q4hrs 5x/day while awake.  out-patient neurology follow up

## 2017-07-22 NOTE — PROGRESS NOTE ADULT - PROBLEM SELECTOR PLAN 2
check dilantin levels.    level 8 today,   Continue dilantin 300mg qhs PO check dilantin levels.    level 8 today, give additional 200mg today (pt only 50kg)  Continue dilantin 300mg qhs PO

## 2017-07-22 NOTE — PROGRESS NOTE ADULT - PROBLEM SELECTOR PLAN 4
- resolved  patient previously had EEG showing non-convulsive status epilepticus  - most recent EEG's have been negative for seizures  - patient is currently on IV fosphenytoin 100mg q8 hours  - spoke with Neurology team about transitioning patient to PO antiepileptics- f/u recommendations  -pheyntoin level 8, will contact neuro regarding if any adjustments need to be done.

## 2017-07-22 NOTE — PROGRESS NOTE ADULT - SUBJECTIVE AND OBJECTIVE BOX
Subjective: Intrval History - mental status unchanged compared to yesterday. moved to floor from ICU.      Objective:   Vital Signs Last 24 Hrs:  T(C): 36.5 (07-22-17 @ 04:32), Max: 36.8 (07-21-17 @ 22:00)  T(F): 97.7 (07-22-17 @ 04:32), Max: 98.2 (07-21-17 @ 22:00)  HR: 87 (07-22-17 @ 04:32) (67 - 87)  BP: 141/89 (07-22-17 @ 04:32) (116/66 - 157/67)  RR: 20 (07-21-17 @ 22:00) (20 - 20)  SpO2: 98% (07-21-17 @ 22:00) (98% - 98%)    General Exam:   General appearance: No acute distress.  In bed. some linear marks on forehead from VEEG leads. healing.                   Neurological Exam:  Mental Status: not orientated to time place and person    Cranial Nerves:  PERRL, EOMI, VFF, no nystagmus or diplopia. CN V1-3 intact to light touch and pinprick.  No facial asymmetry  Tongue, uvula and palate midline.    Motor:   Tone: increased symmetrically, cogwheeling and flexor spasticity in arms, extensor in legs bilat           Strength: limited as patient is noncooperative but  spontaneous movement seen in all four extremity  Tremor: resting tremor seen bilaterally upper extremity     Sensation: intact to light touch    Deep Tendon Reflexes: 2+ Bi, Knee  Gait: deferred     Other:    07-20    144  |  102  |  9   ----------------------------<  93  4.3   |  30  |  0.35<L>    Ca    9.3      20 Jul 2017 04:35  Phos  3.2     07-20  Mg     2.4     07-20    TPro  6.9  /  Alb  3.4  /  TBili  0.2  /  DBili  x   /  AST  23  /  ALT  5<L>  /  AlkPhos  83  07-20    LIVER FUNCTIONS - ( 20 Jul 2017 04:35 )  Alb: 3.4 g/dL / Pro: 6.9 g/dL / ALK PHOS: 83 U/L / ALT: 5 U/L RC / AST: 23 U/L / GGT: x                                 10.4   5.9   )-----------( 208      ( 20 Jul 2017 04:35 )             31.7     Radiology    no recent imaging       MEDICATIONS  (STANDING):  carbidopa/levodopa  25/100 2 Tablet(s) Oral every 4 hours  heparin  Injectable 5000 Unit(s) SubCutaneous every 8 hours  levothyroxine 75 MICROGram(s) Oral daily  pantoprazole   Suspension 40 milliGRAM(s) Oral before breakfast  fosphenytoin IVPB 100 milliGRAM(s) PE IV Intermittent every 8 hours  sodium chloride 0.65% Nasal 1 Spray(s) Both Nostrils three times a day  BACItracin   Ointment 1 Application(s) Topical two times a day  dexmedetomidine Infusion 0.3 MICROgram(s)/kG/Hr (3.637 mL/Hr) IV Continuous <Continuous>    MEDICATIONS  (PRN):  fentaNYL    Injectable 25 MICROGram(s) IV Push every 2 hours PRN Moderate Pain (4 - 6)  LORazepam   Injectable 1 milliGRAM(s) IV Push every 6 hours PRN Agitation

## 2017-07-22 NOTE — PROGRESS NOTE ADULT - PROBLEM SELECTOR PLAN 2
-pt UTI negative  -will go back and will do supervised vaginal exam; possible candiasis vs vaginosis? -pt UTI negative  -will go back and will do supervised vaginal exam; possible candiasis vs vaginosis?    Update:  checked vaginal exam with female PCA Juliana Cazares supervising. No erythema, no exudate Pt has stool that needs to be cleaned. Pt denied any dysuria with blinking. Will monitor.

## 2017-07-22 NOTE — PROGRESS NOTE ADULT - PROBLEM SELECTOR PLAN 1
- pt now has SOB. No evidence of epixtasis. Will check O2 sat on RA. Will order CXR.   - Patient was previously intubated in the MICU due to inability to protect airway from epistaxis  - patient has been saturating well on NC, but will wean down - pt now has SOB. No evidence of epixtasis. Will check O2 sat on RA. Will order CXR.   - Patient was previously intubated in the MICU due to inability to protect airway from epistaxis    Update:  CXR negative. instructed nurse to wean down on O2 for goal O2 sat >92  - patient has been saturating well on NC, but will wean down - pt now has SOB. No evidence of epistaxis. Will check O2 sat on RA. Will order CXR.   - Patient was previously intubated in the MICU due to inability to protect airway from epistaxis    Update:  CXR negative. instructed nurse to wean down on O2 for goal O2 sat >92  - patient has been saturating well on NC, but will wean down

## 2017-07-22 NOTE — PROGRESS NOTE ADULT - ASSESSMENT
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 Patient was transferred to medical floors on 7/21/17 now with dysuria, SOB.

## 2017-07-22 NOTE — PROGRESS NOTE ADULT - SUBJECTIVE AND OBJECTIVE BOX
Subjective  Pt endorses dysuria, and SOB. No cough, fevers, chills.       VITAL SIGNS:  Vital Signs Last 24 Hrs  T(C): 36.5 (22 Jul 2017 04:32), Max: 36.8 (21 Jul 2017 22:00)  T(F): 97.7 (22 Jul 2017 04:32), Max: 98.2 (21 Jul 2017 22:00)  HR: 87 (22 Jul 2017 04:32) (67 - 87)  BP: 141/89 (22 Jul 2017 04:32) (116/66 - 157/67)  BP(mean): --  RR: 20 (21 Jul 2017 22:00) (20 - 20)  SpO2: 98% (21 Jul 2017 22:00) (98% - 98%)      PHYSICAL EXAM:     GENERAL: not in acute distress  RESPIRATORY: CTAB, no w/c   CARDIOVASCULAR: RRR, no murmurs, gallops, rubs  ABDOMINAL: soft, non-tender, (+) PEG  EXTREMITIES: no clubbing, cyanosis, or edema  NEUROLOGICAL: patient is non verbal with resting tremors in both arms  MUSCULOSKELETAL: no gross joint deformity                          10.6   12.1  )-----------( 244      ( 21 Jul 2017 02:48 )             35.2     07-21    145  |  101  |  18  ----------------------------<  118<H>  4.2   |  32<H>  |  0.51    Ca    9.5      21 Jul 2017 02:48  Phos  3.8     07-21  Mg     2.3     07-21    TPro  6.8  /  Alb  3.4  /  TBili  0.2  /  DBili  x   /  AST  25  /  ALT  5<L>  /  AlkPhos  83  07-21      CAPILLARY BLOOD GLUCOSE          MEDICATIONS  (STANDING):  heparin  Injectable 5000 Unit(s) SubCutaneous every 8 hours  levothyroxine 75 MICROGram(s) Oral daily  pantoprazole   Suspension 40 milliGRAM(s) Oral before breakfast  sodium chloride 0.65% Nasal 1 Spray(s) Both Nostrils three times a day  BACItracin   Ointment 1 Application(s) Topical two times a day  phenytoin   Capsule 300 milliGRAM(s) Oral at bedtime  carbidopa/levodopa  25/100 2 Tablet(s) Oral every 4 hours    MEDICATIONS  (PRN):  LORazepam   Injectable 1 milliGRAM(s) IV Push every 6 hours PRN Agitation

## 2017-07-22 NOTE — PROGRESS NOTE ADULT - ASSESSMENT
62 -year-old F with cognitive impairment, TBI c/b ICH, Parkinson's Disease (in addition to h/o chronic neuroleptic use) s/p PEG placement for aspiration, Depression, Schizophrenia, Hypothyroidism, GERD, evaluated for epistaxis, had respiratory distress in the setting of epistaxis and failure to protect airway, admitted to MICU. VEEG discontinued. Patient reported no seizure like activity. Resting tremor improved compare to baseline due to carbidopa-levodopa. Patient is currently on Dilantin for seizure treatment per ICU.

## 2017-07-23 ENCOUNTER — TRANSCRIPTION ENCOUNTER (OUTPATIENT)
Age: 62
End: 2017-07-23

## 2017-07-23 DIAGNOSIS — R13.11 DYSPHAGIA, ORAL PHASE: ICD-10-CM

## 2017-07-23 LAB
BASOPHILS # BLD AUTO: 0 K/UL — SIGNIFICANT CHANGE UP (ref 0–0.2)
BASOPHILS NFR BLD AUTO: 0.1 % — SIGNIFICANT CHANGE UP (ref 0–2)
EOSINOPHIL # BLD AUTO: 0.2 K/UL — SIGNIFICANT CHANGE UP (ref 0–0.5)
EOSINOPHIL NFR BLD AUTO: 4 % — SIGNIFICANT CHANGE UP (ref 0–6)
HCT VFR BLD CALC: 37.1 % — SIGNIFICANT CHANGE UP (ref 34.5–45)
HGB BLD-MCNC: 11.9 G/DL — SIGNIFICANT CHANGE UP (ref 11.5–15.5)
LYMPHOCYTES # BLD AUTO: 1.2 K/UL — SIGNIFICANT CHANGE UP (ref 1–3.3)
LYMPHOCYTES # BLD AUTO: 20.8 % — SIGNIFICANT CHANGE UP (ref 13–44)
MCHC RBC-ENTMCNC: 32 GM/DL — SIGNIFICANT CHANGE UP (ref 32–36)
MCHC RBC-ENTMCNC: 33.4 PG — SIGNIFICANT CHANGE UP (ref 27–34)
MCV RBC AUTO: 104 FL — HIGH (ref 80–100)
MONOCYTES # BLD AUTO: 0.5 K/UL — SIGNIFICANT CHANGE UP (ref 0–0.9)
MONOCYTES NFR BLD AUTO: 9.6 % — SIGNIFICANT CHANGE UP (ref 2–14)
NEUTROPHILS # BLD AUTO: 3.7 K/UL — SIGNIFICANT CHANGE UP (ref 1.8–7.4)
NEUTROPHILS NFR BLD AUTO: 65.4 % — SIGNIFICANT CHANGE UP (ref 43–77)
PLATELET # BLD AUTO: 262 K/UL — SIGNIFICANT CHANGE UP (ref 150–400)
RBC # BLD: 3.56 M/UL — LOW (ref 3.8–5.2)
RBC # FLD: 13 % — SIGNIFICANT CHANGE UP (ref 10.3–14.5)
WBC # BLD: 5.6 K/UL — SIGNIFICANT CHANGE UP (ref 3.8–10.5)
WBC # FLD AUTO: 5.6 K/UL — SIGNIFICANT CHANGE UP (ref 3.8–10.5)

## 2017-07-23 PROCEDURE — 99233 SBSQ HOSP IP/OBS HIGH 50: CPT | Mod: GC

## 2017-07-23 RX ORDER — CARBIDOPA AND LEVODOPA 25; 100 MG/1; MG/1
2 TABLET ORAL EVERY 4 HOURS
Qty: 0 | Refills: 0 | Status: DISCONTINUED | OUTPATIENT
Start: 2017-07-23 | End: 2017-07-26

## 2017-07-23 RX ADMIN — Medication 1 SPRAY(S): at 05:23

## 2017-07-23 RX ADMIN — Medication 1 MILLIGRAM(S): at 02:15

## 2017-07-23 RX ADMIN — Medication 100 MILLIGRAM(S): at 21:13

## 2017-07-23 RX ADMIN — HEPARIN SODIUM 5000 UNIT(S): 5000 INJECTION INTRAVENOUS; SUBCUTANEOUS at 15:12

## 2017-07-23 RX ADMIN — HEPARIN SODIUM 5000 UNIT(S): 5000 INJECTION INTRAVENOUS; SUBCUTANEOUS at 21:15

## 2017-07-23 RX ADMIN — Medication 1 SPRAY(S): at 21:14

## 2017-07-23 RX ADMIN — CARBIDOPA AND LEVODOPA 2 TABLET(S): 25; 100 TABLET ORAL at 02:41

## 2017-07-23 RX ADMIN — Medication 1 APPLICATION(S): at 18:17

## 2017-07-23 RX ADMIN — CARBIDOPA AND LEVODOPA 2 TABLET(S): 25; 100 TABLET ORAL at 10:59

## 2017-07-23 RX ADMIN — HEPARIN SODIUM 5000 UNIT(S): 5000 INJECTION INTRAVENOUS; SUBCUTANEOUS at 05:23

## 2017-07-23 RX ADMIN — CARBIDOPA AND LEVODOPA 2 TABLET(S): 25; 100 TABLET ORAL at 06:06

## 2017-07-23 RX ADMIN — PANTOPRAZOLE SODIUM 40 MILLIGRAM(S): 20 TABLET, DELAYED RELEASE ORAL at 10:59

## 2017-07-23 RX ADMIN — Medication 75 MICROGRAM(S): at 05:23

## 2017-07-23 RX ADMIN — CARBIDOPA AND LEVODOPA 2 TABLET(S): 25; 100 TABLET ORAL at 15:12

## 2017-07-23 RX ADMIN — CARBIDOPA AND LEVODOPA 2 TABLET(S): 25; 100 TABLET ORAL at 18:17

## 2017-07-23 RX ADMIN — Medication 1 APPLICATION(S): at 05:23

## 2017-07-23 RX ADMIN — CARBIDOPA AND LEVODOPA 2 TABLET(S): 25; 100 TABLET ORAL at 21:14

## 2017-07-23 RX ADMIN — Medication 1 SPRAY(S): at 15:12

## 2017-07-23 NOTE — DISCHARGE NOTE ADULT - SECONDARY DIAGNOSIS.
Parkinsonian tremor Status epilepticus Oral phase dysphagia Gastroesophageal reflux disease, esophagitis presence not specified Hypothyroidism Prophylactic measure

## 2017-07-23 NOTE — PROGRESS NOTE ADULT - PROBLEM SELECTOR PLAN 4
- resolved  patient previously had EEG showing non-convulsive status epilepticus  - most recent EEG's have been negative for seizures  - spoke with Neurology team about transitioning patient to PO antiepileptics  - c/w PO Dilantin - resolved  patient previously had EEG showing non-convulsive status epilepticus  - most recent EEG's have been negative for seizures  - spoke with Neurology team about transitioning patient to PO antiepileptics (Via PEG)  - c/w PO Dilantin

## 2017-07-23 NOTE — DISCHARGE NOTE ADULT - MEDICATION SUMMARY - MEDICATIONS TO TAKE
I will START or STAY ON the medications listed below when I get home from the hospital:    phenytoin 25 mg/mL oral suspension  -- 4 milliliter(s) by mouth every 8 hours  -- Indication: For Status epilepticus    clonazePAM 0.5 mg oral tablet  -- 1 tab(s) by mouth 2 times a day  -- Indication: For Status epilepticus    LORazepam 1 mg oral tablet  -- 1 tab(s) by mouth every 4 hours, As needed, Agitation via PEG  -- Indication: For Status epilepticus    carbidopa-levodopa 25 mg-100 mg oral tablet  -- 2 tab(s) by mouth every 4 hours via PEG  -- Indication: For Parkinsonian tremor    bacitracin 500 units/g topical ointment  -- 1 application on skin 2 times a day  -- Indication: For Prophylactic measure    Zantac 75 oral tablet  -- 1 tab(s) by mouth once a day via PEG  -- Indication: For Gastroesophageal reflux disease, esophagitis presence not specified    sodium chloride 0.65% nasal spray  -- 1 spray(s) into nose   -- Indication: For Epistaxis, recurrent    pantoprazole 40 mg oral granule, enteric coated  -- 40 milligram(s) by mouth   -- Indication: For Gastroesophageal reflux disease, esophagitis presence not specified    levothyroxine 75 mcg (0.075 mg) oral tablet  -- 1 tab(s) by mouth once a day  -- Indication: For Hypothyroidism I will START or STAY ON the medications listed below when I get home from the hospital:    phenytoin 25 mg/mL oral suspension  -- 4 milliliter(s) by mouth every 8 hours  -- Indication: For Status epilepticus    LORazepam 1 mg oral tablet  -- 1 tab(s) by mouth every 4 hours, As needed, Agitation via PEG  -- Indication: For Status epilepticus    carbidopa-levodopa 25 mg-100 mg oral tablet  -- 2 tab(s) by mouth every 4 hours via PEG  -- Indication: For Parkinsonian tremor    bacitracin 500 units/g topical ointment  -- 1 application on skin 2 times a day  -- Indication: For Prophylactic measure    Zantac 75 oral tablet  -- 1 tab(s) by mouth once a day via PEG  -- Indication: For Gastroesophageal reflux disease, esophagitis presence not specified    sodium chloride 0.65% nasal spray  -- 1 spray(s) into nose   -- Indication: For Epistaxis, recurrent    levothyroxine 75 mcg (0.075 mg) oral tablet  -- 1 tab(s) by mouth once a day  -- Indication: For Hypothyroidism I will START or STAY ON the medications listed below when I get home from the hospital:    phenytoin 25 mg/mL oral suspension  -- 4 milliliter(s) by mouth every 8 hours  -- Indication: For Status epilepticus    phenytoin 25 mg/mL oral suspension  -- 4 milliliter(s) by mouth every 8 hours  -- Indication: For Seizure    clonazePAM 0.5 mg oral tablet  -- 1 tab(s) by mouth 2 times a day MDD:1mg  -- Indication: For Parkinsonian tremor    LORazepam 1 mg oral tablet  -- 1 tab(s) by mouth every 4 hours, As needed, Agitation via PEG  -- Indication: For Status epilepticus    carbidopa-levodopa 25 mg-100 mg oral tablet  -- 2 tab(s) by mouth every 4 hours via PEG  -- Indication: For Parkinsonian tremor    bacitracin 500 units/g topical ointment  -- 1 application on skin 2 times a day  -- Indication: For Prophylactic measure    Zantac 75 oral tablet  -- 1 tab(s) by mouth once a day via PEG  -- Indication: For Gastroesophageal reflux disease, esophagitis presence not specified    sodium chloride 0.65% nasal spray  -- 1 spray(s) into nose   -- Indication: For Epistaxis, recurrent    levothyroxine 75 mcg (0.075 mg) oral tablet  -- 1 tab(s) by mouth once a day  -- Indication: For Hypothyroidism

## 2017-07-23 NOTE — PROGRESS NOTE ADULT - PROBLEM SELECTOR PLAN 2
- patient is non- verbal so unable to confirm patient still has these symptoms.   -pt UTI negative  - f/u culture results    Update:  checked vaginal exam with female PCA Juliana Cazares supervising. No erythema, no exudate Pt has stool that needs to be cleaned. Pt denied any dysuria with blinking. Will monitor. - patient is non- verbal so unable to confirm patient still has these symptoms.   -  UA is not consistent with UTI  - f/u culture results --> Culture - Urine (07.14.17 @ 12:35)    Specimen Source: .Urine Clean Catch (Midstream)    Culture Results: No growth

## 2017-07-23 NOTE — DISCHARGE NOTE ADULT - CARE PLAN
Principal Discharge DX:	Epistaxis, recurrent  Secondary Diagnosis:	Parkinsonian tremor  Secondary Diagnosis:	Status epilepticus  Secondary Diagnosis:	Oral phase dysphagia  Secondary Diagnosis:	Gastroesophageal reflux disease, esophagitis presence not specified  Secondary Diagnosis:	Hypothyroidism Principal Discharge DX:	Epistaxis, recurrent  Goal:	Please keep the nose moist with the nasal sprays  Instructions for follow-up, activity and diet:	Keep packed if the nose starts bleeding and to use the nasal sprays  Secondary Diagnosis:	Parkinsonian tremor  Goal:	Use the Keppra and Klonopin  Instructions for follow-up, activity and diet:	Followup with your primary care doctor  Secondary Diagnosis:	Status epilepticus  Goal:	Use the Keppra and Klonopin  Instructions for follow-up, activity and diet:	Followup with your primary care doctor  Secondary Diagnosis:	Oral phase dysphagia  Goal:	Keep with the suctioning  Instructions for follow-up, activity and diet:	Suctioning at home per the home health aides  Secondary Diagnosis:	Gastroesophageal reflux disease, esophagitis presence not specified  Goal:	Continue with your Protonix  Instructions for follow-up, activity and diet:	Followup with your primary care doctor and continue with the protonix  Secondary Diagnosis:	Hypothyroidism  Goal:	Continue with your synthroid  Instructions for follow-up, activity and diet:	Followup with your primary care doctor and with the synthroid  Secondary Diagnosis:	Prophylactic measure  Goal:	Dental cleanup  Instructions for follow-up, activity and diet:	Dental service here recommended Dental visit followup but would need to schedule one at a facility that can handle her needs also. Principal Discharge DX:	Epistaxis, recurrent  Goal:	Please keep the nose moist with the nasal sprays, avoid blood thinners  Instructions for follow-up, activity and diet:	Keep packed if the nose starts bleeding and to use the nasal sprays, Followup with your primary care doctor Dr. Garcia  Secondary Diagnosis:	Parkinsonian tremor  Goal:	Use the Keppra and Klonopin  Instructions for follow-up, activity and diet:	Followup with your primary care doctor Dr. Garcia and Dr. Coleman your neurologist  Secondary Diagnosis:	Status epilepticus  Goal:	Use the Keppra and Klonopin  Instructions for follow-up, activity and diet:	Followup with your primary care doctor  Secondary Diagnosis:	Oral phase dysphagia  Goal:	Keep with the suctioning  Instructions for follow-up, activity and diet:	Suctioning at home per the home health aides  Secondary Diagnosis:	Gastroesophageal reflux disease, esophagitis presence not specified  Goal:	Continue with your Protonix  Instructions for follow-up, activity and diet:	Followup with your primary care doctor and continue with the protonix  Secondary Diagnosis:	Hypothyroidism  Goal:	Continue with your synthroid  Instructions for follow-up, activity and diet:	Followup with your primary care doctor and with the synthroid  Secondary Diagnosis:	Prophylactic measure  Goal:	Dental cleanup  Instructions for follow-up, activity and diet:	Dental service here recommended Dental visit followup but would need to schedule one at a facility that can handle her needs also. Principal Discharge DX:	Epistaxis, recurrent  Goal:	Please keep the nose moist with the nasal sprays, avoid blood thinners  Instructions for follow-up, activity and diet:	Keep packed if the nose starts bleeding and to use the nasal sprays, Followup with your primary care doctor Dr. Garcia  Secondary Diagnosis:	Parkinsonian tremor  Goal:	Use the dilantin and Klonopin  Instructions for follow-up, activity and diet:	Followup with your primary care doctor Dr. Garcia and Dr. Coleman your neurologist  Secondary Diagnosis:	Status epilepticus  Goal:	Use the dilantin and Klonopin  Instructions for follow-up, activity and diet:	Followup with your primary care doctor  Secondary Diagnosis:	Oral phase dysphagia  Goal:	Keep with the suctioning  Instructions for follow-up, activity and diet:	Suctioning at home per the home health aides  Secondary Diagnosis:	Gastroesophageal reflux disease, esophagitis presence not specified  Goal:	Continue with your zantac  Instructions for follow-up, activity and diet:	Followup with your primary care doctor and continue with the protonix  Secondary Diagnosis:	Hypothyroidism  Goal:	Continue with your synthroid  Instructions for follow-up, activity and diet:	Followup with your primary care doctor and with the synthroid  Secondary Diagnosis:	Prophylactic measure  Goal:	Dental cleanup  Instructions for follow-up, activity and diet:	Dental service here recommended Dental visit followup but would need to schedule one at a facility that can handle her needs also.

## 2017-07-23 NOTE — PROGRESS NOTE ADULT - PROBLEM SELECTOR PLAN 1
- pt now has SOB. No evidence of epixtasis. Will check O2 sat on RA. Will order CXR.   - Patient was previously intubated in the MICU due to inability to protect airway from epistaxis  CXR negative. instructed nurse to wean down on O2 for goal O2 sat >92  - patient has been saturating well on NC, but will wean down -resolved  - No evidence of epistasis.   - Patient was previously intubated in the MICU due to inability to protect airway from epistaxis which has resolved  CXR negative. instructed nurse to wean down on O2 for goal O2 sat >92  - patient has been saturating well on NC, but will wean down as tolerated

## 2017-07-23 NOTE — PROGRESS NOTE ADULT - SUBJECTIVE AND OBJECTIVE BOX
Subjective: No acute events overnight. Patient is non verbal.         VITAL SIGNS:  Vital Signs Last 24 Hrs  T(C): 36.4 (23 Jul 2017 04:59), Max: 36.8 (22 Jul 2017 14:02)  T(F): 97.5 (23 Jul 2017 04:59), Max: 98.2 (22 Jul 2017 14:02)  HR: 75 (23 Jul 2017 04:59) (75 - 91)  BP: 132/77 (23 Jul 2017 04:59) (93/52 - 132/77)  BP(mean): --  RR: 16 (23 Jul 2017 04:59) (16 - 20)  SpO2: 98% (23 Jul 2017 04:59) (95% - 98%)      PHYSICAL EXAM:       GENERAL: not in acute distress  RESPIRATORY: CTAB, no w/c   CARDIOVASCULAR: RRR, no murmurs, gallops, rubs  ABDOMINAL: soft, non-tender, (+) PEG  EXTREMITIES: no clubbing, cyanosis, or edema  NEUROLOGICAL: patient is non verbal with resting tremors in both arms  MUSCULOSKELETAL: no gross joint deformity                          11.9   5.6   )-----------( 262      ( 23 Jul 2017 06:21 )             37.1         MEDICATIONS  (STANDING):  heparin  Injectable 5000 Unit(s) SubCutaneous every 8 hours  levothyroxine 75 MICROGram(s) Oral daily  pantoprazole   Suspension 40 milliGRAM(s) Oral before breakfast  sodium chloride 0.65% Nasal 1 Spray(s) Both Nostrils three times a day  BACItracin   Ointment 1 Application(s) Topical two times a day  carbidopa/levodopa  25/100 2 Tablet(s) Oral every 4 hours  phenytoin   Capsule 300 milliGRAM(s) Oral at bedtime    MEDICATIONS  (PRN):  LORazepam   Injectable 1 milliGRAM(s) IV Push every 6 hours PRN Agitation

## 2017-07-23 NOTE — PROGRESS NOTE ADULT - ASSESSMENT
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 Patient was transferred to medical floors on 7/21/17 and is now stable.

## 2017-07-23 NOTE — DISCHARGE NOTE ADULT - HOSPITAL COURSE
63y/o F with cognitive impairment, TBI c/b ICH, Drug-induced Parkinson's Disease (neuroleptic use), nonverbal at baseline, s/p PEG placement for aspiration PNAs in the past, Depression, Schizophrenia, Hypothyroidism, GERD, BIBEMS for evaluation of mainly R nasal epistaxis with O2 sats in the 70s upon arrival. At home patient noticed blood in her airway while doing suctioning. She chronically requires suctioning in order to maintain her airway. She does have a history of nasal surgery in the past/ unclear exactly what was performed and ENT is following. At the ED she was noted to have resp distress, acute desaturation and AMS/obtunded, placed on BIPAP (12/5), Solumedrol 125mg IV and Duonebs. She was emergently intubated due to still being obtunded, hypoxic, acidotic on VBG and hypercapnic even on BIPAP. Labs on admission sig of K+ 5.5 but hemolyzed, bump in Cr 2/2 MARLENE AST of 56, pH of 7.18 on VBG, Lactate of 3.6, pCO2 of 105, pO2 of 39. CBC wnl, CE negative. Baseline nonverbal? functional status from TBI/Parkinsons. Admitted to MICU on 7/13, CXR clear, ENT saw the patient and left packing in for 3 days and started Ancef 1gm q8 for 3 days. 7/13: possible seizure overnight and given 2mg ativan, EEG showed nonconvulsive status, loaded fospheytoin and versed drip, ordered CTH and found to be neg, Neuro consult called, abx broaden to Vanc/Cefepime. 7/15 no seizures, weaning off levo and 7/16 titrated versed down to 3mg/hr, no seizures and nasal packing removed and off abx. 7/16 versed to 2mg/hr and off levo and planned weaning exercises. 7/17 versed held, continued weaning, dropped the RR to 12. 7/18 overnight had a jerking movement episode, c/w precedex and Fosphenytoin with fentanyl for agitation PRN. On 7/19 added ativan for PRN agitation, extubated, started on CPAP and then ventimask, became tachycardic and desatted, given epi, duobnebs, solumedrol and placed back on BIPAP. 7/20 on NC4L and satted well, no epistaxis or desatting. 7/22 dysuria noted by nonverbal communication with blinking but UA/UC neg, on phenytoin 100mg q8hr as per neuro, f/u ABG, B12, Fe studies     Abx: Vanc/Cefepime 7/14 -7/16  Dispo: full code 61y/o F with cognitive impairment, TBI c/b ICH, Drug-induced Parkinson's Disease (neuroleptic use), nonverbal at baseline, s/p PEG placement for aspiration PNAs in the past, Depression, Schizophrenia, Hypothyroidism, GERD, BIBEMS for evaluation of mainly R nasal epistaxis with O2 sats in the 70s upon arrival. At home patient noticed blood in her airway while doing suctioning. She chronically requires suctioning in order to maintain her airway. She does have a history of nasal surgery in the past/ unclear exactly what was performed and ENT is following. At the ED she was noted to have resp distress, acute desaturation and AMS/obtunded, placed on BIPAP (12/5), Solumedrol 125mg IV and Duonebs. She was emergently intubated due to still being obtunded, hypoxic, acidotic on VBG and hypercapnic even on BIPAP. Labs on admission sig of K+ 5.5 but hemolyzed, bump in Cr 2/2 MARLENE AST of 56, pH of 7.18 on VBG, Lactate of 3.6, pCO2 of 105, pO2 of 39. CBC wnl, CE negative. Baseline nonverbal? functional status from TBI/Parkinsons. Admitted to MICU on 7/13, CXR clear, ENT saw the patient and left packing in for 3 days and started Ancef 1gm q8 for 3 days. 7/13: possible seizure overnight and given 2mg ativan, EEG showed nonconvulsive status, loaded fospheytoin and versed drip, ordered CTH and found to be neg, Neuro consult called, abx broaden to Vanc/Cefepime. 7/15 no seizures, weaning off levo and 7/16 titrated versed down to 3mg/hr, no seizures and nasal packing removed and off abx. 7/16 versed to 2mg/hr and off levo and planned weaning exercises. 7/17 versed held, continued weaning, dropped the RR to 12. 7/18 overnight had a jerking movement episode, c/w precedex and Fosphenytoin with fentanyl for agitation PRN. On 7/19 added ativan for PRN agitation, extubated, started on CPAP and then ventimask, became tachycardic and desatted, given epi, duobnebs, solumedrol and placed back on BIPAP. 7/20 on NC4L and satted well, no epistaxis or desatting. 7/22 dysuria noted by nonverbal communication with blinking but UA/UC neg, on phenytoin 100mg q8hr as per neuro, f/u ABG, B12, Fe studies     Abx: Vanc/Cefepime 7/14 -7/16  Dispo: full code 61y/o F with cognitive impairment, TBI c/b ICH, Drug-induced Parkinson's Disease (neuroleptic use), nonverbal at baseline, s/p PEG placement for aspiration PNAs in the past, Depression, Schizophrenia, Hypothyroidism, GERD, BIBEMS for evaluation of mainly R nasal epistaxis with O2 sats in the 70s upon arrival. At home patient noticed blood in her airway while doing suctioning. She chronically requires suctioning in order to maintain her airway. She does have a history of nasal surgery in the past/ unclear exactly what was performed and ENT is following. At the ED she was noted to have resp distress, acute desaturation and AMS/obtunded, placed on BIPAP (12/5), Solumedrol 125mg IV and Duonebs. She was emergently intubated due to still being obtunded, hypoxic, acidotic on VBG and hypercapnic even on BIPAP. Labs on admission sig of K+ 5.5 but hemolyzed, bump in Cr 2/2 MARLENE AST of 56, pH of 7.18 on VBG, Lactate of 3.6, pCO2 of 105, pO2 of 39. CBC wnl, CE negative. Baseline nonverbal? functional status from TBI/Parkinsons. Admitted to MICU on 7/13, CXR clear, ENT saw the patient and left packing in for 3 days and started Ancef 1gm q8 for 3 days. 7/13: possible seizure overnight and given 2mg ativan, EEG showed nonconvulsive status, loaded fospheytoin and versed drip, ordered CTH and found to be neg, Neuro consult called, abx broaden to Vanc/Cefepime. 7/15 no seizures, weaning off levo and 7/16 titrated versed down to 3mg/hr, no seizures and nasal packing removed and off abx. 7/16 versed to 2mg/hr and off levo and planned weaning exercises. 7/17 versed held, continued weaning, dropped the RR to 12. 7/18 overnight had a jerking movement episode, c/w precedex and Fosphenytoin with fentanyl for agitation PRN. On 7/19 added ativan for PRN agitation, extubated, started on CPAP and then ventimask, became tachycardic and desatted, given epi, duobnebs, solumedrol and placed back on BIPAP. 7/20 on NC4L and satted well, no epistaxis or desatting. 7/22 dysuria noted by nonverbal communication with blinking but UA/UC neg, on phenytoin 100mg q8hr as per neuro, 7/24 a rapid was called on the patient for cyanosis and seizure like movements/tremors but as per the home health aide this was at baseline and patient returned back with Simanet given afterwards. Neuro was reconsulted 61y/o F with cognitive impairment, TBI c/b ICH, Drug-induced Parkinson's Disease (neuroleptic use), nonverbal at baseline, s/p PEG placement for aspiration PNAs in the past, Depression, Schizophrenia, Hypothyroidism, GERD, BIBEMS for evaluation of mainly R nasal epistaxis with O2 sats in the 70s upon arrival. At home patient noticed blood in her airway while doing suctioning. She chronically requires suctioning in order to maintain her airway. She does have a history of nasal surgery in the past/ unclear exactly what was performed and ENT is following. At the ED she was noted to have resp distress, acute desaturation and AMS/obtunded, placed on BIPAP (12/5), Solumedrol 125mg IV and Duonebs. She was emergently intubated due to still being obtunded, hypoxic, acidotic on VBG and hypercapnic even on BIPAP. Labs on admission sig of K+ 5.5 but hemolyzed, bump in Cr 2/2 MARLENE AST of 56, pH of 7.18 on VBG, Lactate of 3.6, pCO2 of 105, pO2 of 39. CBC wnl, CE negative. Baseline nonverbal? functional status from TBI/Parkinsons. Admitted to MICU on 7/13, CXR clear, ENT saw the patient and left packing in for 3 days and started Ancef 1gm q8 for 3 days. 7/13: possible seizure overnight and given 2mg ativan, EEG showed nonconvulsive status, loaded fospheytoin and versed drip, ordered CTH and found to be neg, Neuro consult called, abx broaden to Vanc/Cefepime. 7/15 no seizures, weaning off levo and 7/16 titrated versed down to 3mg/hr, no seizures and nasal packing removed and off abx. 7/16 versed to 2mg/hr and off levo and planned weaning exercises. 7/17 versed held, continued weaning, dropped the RR to 12. 7/18 overnight had a jerking movement episode, c/w precedex and Fosphenytoin with fentanyl for agitation PRN. On 7/19 added ativan for PRN agitation, extubated, started on CPAP and then ventimask, became tachycardic and desatted, given epi, duobnebs, solumedrol and placed back on BIPAP. 7/20 on NC4L and satted well, no epistaxis or desatting. 7/22 dysuria noted by nonverbal communication with blinking but UA/UC neg, on phenytoin 100mg q8hr as per neuro, 7/24 a rapid was called on the patient for cyanosis and seizure like movements/tremors but as per the home health aide this was at baseline and patient returned back with Simanet given afterwards. Neuro was reconsulted and added clonzaepam 0.5 BID PO qd and the tremors stabilized.

## 2017-07-23 NOTE — DISCHARGE NOTE ADULT - PLAN OF CARE
Please keep the nose moist with the nasal sprays Keep packed if the nose starts bleeding and to use the nasal sprays Use the Keppra and Klonopin Followup with your primary care doctor Keep with the suctioning Suctioning at home per the home health aides Continue with your Protonix Followup with your primary care doctor and continue with the protonix Continue with your synthroid Followup with your primary care doctor and with the synthroid Dental cleanup Dental service here recommended Dental visit followup but would need to schedule one at a facility that can handle her needs also. Please keep the nose moist with the nasal sprays, avoid blood thinners Keep packed if the nose starts bleeding and to use the nasal sprays, Followup with your primary care doctor Dr. Garcia Followup with your primary care doctor Dr. Garcia and Dr. Coleman your neurologist Use the dilantin and Klonopin Continue with your zantac

## 2017-07-24 LAB
BASOPHILS # BLD AUTO: 0.1 K/UL — SIGNIFICANT CHANGE UP (ref 0–0.2)
BASOPHILS NFR BLD AUTO: 1.8 % — SIGNIFICANT CHANGE UP (ref 0–2)
CULTURE RESULTS: NO GROWTH — SIGNIFICANT CHANGE UP
EOSINOPHIL # BLD AUTO: 0.1 K/UL — SIGNIFICANT CHANGE UP (ref 0–0.5)
EOSINOPHIL NFR BLD AUTO: 2.6 % — SIGNIFICANT CHANGE UP (ref 0–6)
HCT VFR BLD CALC: 40 % — SIGNIFICANT CHANGE UP (ref 34.5–45)
HGB BLD-MCNC: 12.2 G/DL — SIGNIFICANT CHANGE UP (ref 11.5–15.5)
LYMPHOCYTES # BLD AUTO: 1 K/UL — SIGNIFICANT CHANGE UP (ref 1–3.3)
LYMPHOCYTES # BLD AUTO: 19.1 % — SIGNIFICANT CHANGE UP (ref 13–44)
MCHC RBC-ENTMCNC: 30.5 GM/DL — LOW (ref 32–36)
MCHC RBC-ENTMCNC: 31.6 PG — SIGNIFICANT CHANGE UP (ref 27–34)
MCV RBC AUTO: 103 FL — HIGH (ref 80–100)
MONOCYTES # BLD AUTO: 0.5 K/UL — SIGNIFICANT CHANGE UP (ref 0–0.9)
MONOCYTES NFR BLD AUTO: 8.8 % — SIGNIFICANT CHANGE UP (ref 2–14)
NEUTROPHILS # BLD AUTO: 3.5 K/UL — SIGNIFICANT CHANGE UP (ref 1.8–7.4)
NEUTROPHILS NFR BLD AUTO: 67.8 % — SIGNIFICANT CHANGE UP (ref 43–77)
PLATELET # BLD AUTO: 266 K/UL — SIGNIFICANT CHANGE UP (ref 150–400)
RBC # BLD: 3.87 M/UL — SIGNIFICANT CHANGE UP (ref 3.8–5.2)
RBC # FLD: 13.3 % — SIGNIFICANT CHANGE UP (ref 10.3–14.5)
SPECIMEN SOURCE: SIGNIFICANT CHANGE UP
WBC # BLD: 5.2 K/UL — SIGNIFICANT CHANGE UP (ref 3.8–10.5)
WBC # FLD AUTO: 5.2 K/UL — SIGNIFICANT CHANGE UP (ref 3.8–10.5)

## 2017-07-24 PROCEDURE — 71010: CPT | Mod: 26,77

## 2017-07-24 PROCEDURE — 71010: CPT | Mod: 26

## 2017-07-24 PROCEDURE — 99291 CRITICAL CARE FIRST HOUR: CPT

## 2017-07-24 RX ADMIN — Medication 1 SPRAY(S): at 21:46

## 2017-07-24 RX ADMIN — CARBIDOPA AND LEVODOPA 2 TABLET(S): 25; 100 TABLET ORAL at 13:47

## 2017-07-24 RX ADMIN — Medication 1 APPLICATION(S): at 18:02

## 2017-07-24 RX ADMIN — CARBIDOPA AND LEVODOPA 2 TABLET(S): 25; 100 TABLET ORAL at 21:46

## 2017-07-24 RX ADMIN — Medication 1 SPRAY(S): at 06:19

## 2017-07-24 RX ADMIN — Medication 1 SPRAY(S): at 13:47

## 2017-07-24 RX ADMIN — CARBIDOPA AND LEVODOPA 2 TABLET(S): 25; 100 TABLET ORAL at 18:02

## 2017-07-24 RX ADMIN — HEPARIN SODIUM 5000 UNIT(S): 5000 INJECTION INTRAVENOUS; SUBCUTANEOUS at 13:47

## 2017-07-24 RX ADMIN — Medication 1 MILLIGRAM(S): at 20:36

## 2017-07-24 RX ADMIN — Medication 100 MILLIGRAM(S): at 21:50

## 2017-07-24 RX ADMIN — CARBIDOPA AND LEVODOPA 2 TABLET(S): 25; 100 TABLET ORAL at 10:39

## 2017-07-24 RX ADMIN — CARBIDOPA AND LEVODOPA 2 TABLET(S): 25; 100 TABLET ORAL at 06:19

## 2017-07-24 RX ADMIN — Medication 100 MILLIGRAM(S): at 13:47

## 2017-07-24 RX ADMIN — Medication 75 MICROGRAM(S): at 06:19

## 2017-07-24 RX ADMIN — Medication 1 APPLICATION(S): at 06:18

## 2017-07-24 RX ADMIN — HEPARIN SODIUM 5000 UNIT(S): 5000 INJECTION INTRAVENOUS; SUBCUTANEOUS at 06:19

## 2017-07-24 RX ADMIN — HEPARIN SODIUM 5000 UNIT(S): 5000 INJECTION INTRAVENOUS; SUBCUTANEOUS at 21:46

## 2017-07-24 RX ADMIN — PANTOPRAZOLE SODIUM 40 MILLIGRAM(S): 20 TABLET, DELAYED RELEASE ORAL at 10:39

## 2017-07-24 RX ADMIN — Medication 100 MILLIGRAM(S): at 06:20

## 2017-07-24 NOTE — CHART NOTE - NSCHARTNOTEFT_GEN_A_CORE
Called to RRT for suspected seizure like activity. Pt's primary team at [facility]. PT found to have jerking arm movements and eyelid fluttering. Pt's vitals were obtained and were found to be stable. . Pupils equally round and reactive to light. She followed commands during the examination and tracked my movements at the bedside. Pt's home health aide at [facility] noted that this is her normal activity at home and different f rom the seizure activity she presented with to the hospital. Neurology was called given low dilantin level and possibility of seizure. No medications were given to patient and arm jerking improved at the end of the rapid. Plan discussed with primary team

## 2017-07-24 NOTE — PROGRESS NOTE ADULT - SUBJECTIVE AND OBJECTIVE BOX
Neurology Progress Note    Interval History -  Neurology was called to re-evaluate patient as an RRT was called earlier today. RN at bedside recounts that patient was receiving her medications via PEG, including sinemet, and was having increased shaking of her upper extremities and her face which happens as her sinemet is wearing off. Patient then started to turn cyanotic and had audible gurgling. RRT was called due to concern for aspiration. Symptoms improved with supplemental O2 and after approximately 20 minutes, as sinemet was starting to take effect per RN. During this time, patient was tracking and was responsive to external stimuli.      MEDICATIONS  (STANDING):  heparin  Injectable 5000 Unit(s) SubCutaneous every 8 hours  levothyroxine 75 MICROGram(s) Oral daily  pantoprazole   Suspension 40 milliGRAM(s) Oral before breakfast  sodium chloride 0.65% Nasal 1 Spray(s) Both Nostrils three times a day  BACItracin   Ointment 1 Application(s) Topical two times a day  carbidopa/levodopa  25/100 2 Tablet(s) Oral every 4 hours  phenytoin   Suspension 100 milliGRAM(s) Oral every 8 hours    MEDICATIONS  (PRN):  LORazepam   Injectable 1 milliGRAM(s) IV Push every 6 hours PRN Agitation        Objective:   Vital Signs Last 24 Hrs  T(C): 36.3 (24 Jul 2017 13:11), Max: 36.8 (24 Jul 2017 04:54)  T(F): 97.3 (24 Jul 2017 13:11), Max: 98.3 (24 Jul 2017 04:54)  HR: 85 (24 Jul 2017 13:11) (63 - 85)  BP: 162/85 (24 Jul 2017 13:11) (111/69 - 162/85)  BP(mean): --  RR: 18 (24 Jul 2017 13:11) (18 - 18)  SpO2: 100% (24 Jul 2017 13:11) (97% - 100%)    General Exam:   General appearance: No acute distress, tremor of jaw and limbs is noted                   Neurological Exam:  Mental Status: Awake and alert, tracking, no verbal output and not following commands    Cranial Nerves: PERRL, limited upgaze with other extraocular movements intact, BTT b/l, face symmetric, + fine tremor of jaw    Motor: All extremities minimally antigravity to tactile stimuli. Increased tone in b/l upper extremities. B/l cogwheel rigidity. RUE pill rolling tremor at rest.     Sensation: Withdrawal of all extremities to light touch    Deep Tendon Reflexes: 2+ bilateral biceps, triceps, and knee    Toes flexor bilaterally      Other:                         12.2   5.2   )-----------( 266      ( 24 Jul 2017 07:14 )             40.0   Dilantin level 7/22: 8.0, CORRECTED LEVEL: 10.3 Neurology Progress Note    Interval History - Neurology was called to re-evaluate patient as an RRT was called earlier today. RN at bedside recounts that patient was receiving her medications via PEG, including sinemet, and was having increased shaking of her upper extremities and her face which happens as her sinemet is wearing off. Patient then started to turn cyanotic and had audible gurgling. RRT was called due to concern for aspiration. Symptoms improved with supplemental O2 and after approximately 20 minutes, as sinemet was starting to take effect per RN. During this time, patient was tracking and was responsive to external stimuli.    MEDICATIONS  (STANDING):  heparin  Injectable 5000 Unit(s) SubCutaneous every 8 hours  levothyroxine 75 MICROGram(s) Oral daily  pantoprazole   Suspension 40 milliGRAM(s) Oral before breakfast  sodium chloride 0.65% Nasal 1 Spray(s) Both Nostrils three times a day  BACItracin   Ointment 1 Application(s) Topical two times a day  carbidopa/levodopa  25/100 2 Tablet(s) Oral every 4 hours  phenytoin   Suspension 100 milliGRAM(s) Oral every 8 hours    MEDICATIONS  (PRN):  LORazepam   Injectable 1 milliGRAM(s) IV Push every 6 hours PRN Agitation    Objective:   Vital Signs Last 24 Hrs  T(C): 36.3 (24 Jul 2017 13:11), Max: 36.8 (24 Jul 2017 04:54)  T(F): 97.3 (24 Jul 2017 13:11), Max: 98.3 (24 Jul 2017 04:54)  HR: 85 (24 Jul 2017 13:11) (63 - 85)  BP: 162/85 (24 Jul 2017 13:11) (111/69 - 162/85)  RR: 18 (24 Jul 2017 13:11) (18 - 18)  SpO2: 100% (24 Jul 2017 13:11) (97% - 100%)    General Exam:   General appearance: No acute distress, tremor of jaw and limbs is noted                   Neurological Exam:  Mental Status: Awake and alert, tracking, no verbal output and not following commands    Cranial Nerves: PERRL, limited upgaze with other extraocular movements intact, BTT b/l, face symmetric, + fine tremor of jaw    Motor: All extremities minimally antigravity to tactile stimuli. Increased tone in b/l upper extremities. B/l cogwheel rigidity. RUE pill rolling tremor at rest.     Sensation: Withdrawal of all extremities to light touch    Deep Tendon Reflexes: 2+ bilateral biceps, triceps, and knee    Toes flexor bilaterally      Other:                         12.2   5.2   )-----------( 266      ( 24 Jul 2017 07:14 )             40.0   Dilantin level 7/22: 8.0, CORRECTED LEVEL: 10.3

## 2017-07-24 NOTE — PROGRESS NOTE ADULT - ASSESSMENT
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 RRT called for cyanotic episode and concern for aspiration. Pt is at her baseline mental status. Shaking noted during RRT likely related to patient's Parkinson's Disease rather than seizure. Dilantin level corrected for albumin is therapeutic (goal range 10-15 for this patient).    - Recommend continuing phenytoin at current dose.  - Continue carbidopa-levodopa 25/100 q4h when awake

## 2017-07-24 NOTE — PROGRESS NOTE ADULT - PROBLEM SELECTOR PLAN 7
c/w 5000 units sq q8 hours    Dispo: needs aids, will touch base with case management Monday pt on tube feeds and tolerating diet.

## 2017-07-24 NOTE — PROGRESS NOTE ADULT - PROBLEM SELECTOR PLAN 2
- patient is non- verbal so unable to confirm patient still has these symptoms.   -  UA is not consistent with UTI  - f/u culture results --> Culture - Urine (07.14.17 @ 12:35)    Specimen Source: .Urine Clean Catch (Midstream)    Culture Results: No growth - transitioned yesterday from PO Dilantin 100mg to PEG tube, dilantin level 8.0 (7/22)  - resolved, EEG has showed non-convulsive status epilepticus   - most recent EEG's (7/18): have been negative for seizures, indicates diffuse or multifocal cerebral dysfxn  - unconcerned for now of the 8.0 (7/22) dilantin lvl - transitioned yesterday from PO Dilantin 100mg to PEG tube, dilantin level 8.0 (7/22) but corrected for albumin therapeutic (goal 10-15)  - resolved, 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 Isaac Valdes is already following -Events of this AM likely not seizures.  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 Isaac Valdes is already following -Events of this AM likely not seizures. Cont Rx as per Neuro:  - transitioned yesterday from PO Dilantin 300mg Chewable qhs to Suspension Dilantin 100mg q8h, dilantin level 8.0 (7/22) but corrected for albumin therapeutic (goal 10-15)  -Ativan 1mg q6h PRN for agitation   - most recent EEG's (7/18): have been negative for seizures, indicates diffuse or multifocal cerebral dysfxn  -Epilepsy consult deferred for now as Dr. Isaac Valdes is already following

## 2017-07-24 NOTE — PROGRESS NOTE ADULT - PROBLEM SELECTOR PLAN 4
- resolved  patient previously had EEG showing non-convulsive status epilepticus  - most recent EEG's have been negative for seizures  - spoke with Neurology team about transitioning patient to PO antiepileptics (Via PEG)  - c/w PO Dilantin - patient is non- verbal so unable to confirm patient still has these symptoms.   -UA is not consistent with UTI  -UC 7/22: NGTD

## 2017-07-24 NOTE — PROGRESS NOTE ADULT - PROBLEM SELECTOR PLAN 8
pt on tube feeds and tolerating diet. c/w 5000 units sq q8 hours    Dispo: needs aids, will touch base with case management Monday c/w 5000 units hep sq q8 hours    Dispo: has private home-health aides 24/7, and private pay PT 1xwk. No [facility] services. Pt has home oxygen and home suction machine.  Mother decline need for [facility] and would prefer pt return home. pt lives at home with her and her son/Gene. Pt requires complete care for ADL's and ambulation with rolling walker.

## 2017-07-24 NOTE — PROGRESS NOTE ADULT - ASSESSMENT
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 Patient was transferred to medical floors on 7/21/17 and is now stable. 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 Patient was transferred to medical floors on 7/21/17 and is now stable.   Episode of clonic arm movement with mouth movement and blinking.  Not responsive during event.  Difficult to determine if this was seizure activity (complex partial) or course parkinsons.  was hypoxic during event.  episode resolved without intervention

## 2017-07-24 NOTE — PROGRESS NOTE ADULT - PROBLEM SELECTOR PLAN 3
- stable  - patient has resting tremors that improve after Sinemet administration  c/w Sinemet -resolved  - No evidence of epistaxis  - Patient was previously intubated in the MICU due to inability to protect airway from epistaxis which has resolved  CXR negative. instructed nurse to wean down on O2 for goal O2 sat >92  - patient has been saturating well on NC, but will wean down as tolerated -resolved  - No evidence of epistaxis  - Patient was previously intubated in the MICU due to inability to protect airway from epistaxis which has resolved  c/w bacitracin ointment BID and NS Nasal TID at nostrils   CXR negative. instructed nurse to wean down on O2 for goal O2 sat >92  - patient has been saturating well on NC, has home O2

## 2017-07-24 NOTE — PROGRESS NOTE ADULT - ATTENDING COMMENTS
Event requiring RRT was likley parkinsons.  Was not fully adressed by Neurology, will request re-evaluation in AM  May have aspirated during the event.  CXR clear.  No antibiotics for the time being.  Low threshold for starting (vanco.zosyn) for fever or respiratory deterioration Event requiring RRT was cecil parkinsons.  Was not fully addressed by Neurology, will request re-evaluation in AM  May have aspirated during the event.  CXR clear.  No antibiotics for the time being.  Low threshold for starting (vanco/zosyn) for fever or respiratory deterioration  45 minutes critical care time 10:15 to 11:00 AM

## 2017-07-24 NOTE — PROGRESS NOTE ADULT - PROBLEM SELECTOR PLAN 1
-resolved  - No evidence of epistasis.   - Patient was previously intubated in the MICU due to inability to protect airway from epistaxis which has resolved  CXR negative. instructed nurse to wean down on O2 for goal O2 sat >92  - patient has been saturating well on NC, but will wean down as tolerated - stable  - patient has resting tremors that improve after Sinemet administration  c/w Sinemet - stable  - patient has resting tremors that improve after Sinemet administration  - c/w Sinemet (carbidopa-levodopa 25/100 q4h) -   - patient has resting tremors that improve after Sinemet administration  -Need neuro input for better control given events of this AM  - c/w Sinemet (carbidopa-levodopa 25/100 q4h) - patient has resting tremors that improve after Sinemet administration  - need neuro input for better control given events of this AM  - c/w Sinemet (carbidopa-levodopa 25/100 q4h)

## 2017-07-24 NOTE — PROGRESS NOTE ADULT - SUBJECTIVE AND OBJECTIVE BOX
Patient is a 62y old  Female who presents with a chief complaint of Epistaxis (2017 22:41)      SUBJECTIVE / OVERNIGHT EVENTS:    MEDICATIONS  (STANDING):  heparin  Injectable 5000 Unit(s) SubCutaneous every 8 hours  levothyroxine 75 MICROGram(s) Oral daily  pantoprazole   Suspension 40 milliGRAM(s) Oral before breakfast  sodium chloride 0.65% Nasal 1 Spray(s) Both Nostrils three times a day  BACItracin   Ointment 1 Application(s) Topical two times a day  carbidopa/levodopa  25/100 2 Tablet(s) Oral every 4 hours  phenytoin   Suspension 100 milliGRAM(s) Oral every 8 hours    MEDICATIONS  (PRN):  LORazepam   Injectable 1 milliGRAM(s) IV Push every 6 hours PRN Agitation    Allergies    sulfa drugs (Unknown)    Intolerances          CAPILLARY BLOOD GLUCOSE        I&O's Summary    2017 07:01  -  2017 07:00  --------------------------------------------------------  IN: 840 mL / OUT: 0 mL / NET: 840 mL    2017 07:01  -  2017 06:43  --------------------------------------------------------  IN: 520 mL / OUT: 0 mL / NET: 520 mL      Daily     Daily     PHYSICAL EXAM:   GENERAL: not in acute distress  RESPIRATORY: CTAB, no w/c   CARDIOVASCULAR: RRR, no murmurs, gallops, rubs  ABDOMINAL: soft, non-tender, (+) PEG  EXTREMITIES: no clubbing, cyanosis, or edema  NEUROLOGICAL: patient is non verbal with resting tremors in both arms  MUSCULOSKELETAL: no gross joint deformity      LABS:                        11.9   5.6   )-----------( 262      ( 2017 06:21 )             37.1                   Urinalysis Basic - ( 2017 10:29 )    Color: Yellow / Appearance: x / S.014 / pH: x  Gluc: x / Ketone: Negative  / Bili: Negative / Urobili: Negative   Blood: x / Protein: Negative / Nitrite: Negative   Leuk Esterase: Negative / RBC: x / WBC 2-5 /HPF   Sq Epi: x / Non Sq Epi: x / Bacteria: Few /HPF        RADIOLOGY & ADDITIONAL TESTS:    Imaging Personally Reviewed:  Yes    Consultant(s) Notes Reviewed:      Care Discussed with Consultants/Other Providers: Patient is a 62y old  Female who presents with a chief complaint of Epistaxis (2017 22:41)      SUBJECTIVE / OVERNIGHT EVENTS:  Pt     MEDICATIONS  (STANDING):  heparin  Injectable 5000 Unit(s) SubCutaneous every 8 hours  levothyroxine 75 MICROGram(s) Oral daily  pantoprazole   Suspension 40 milliGRAM(s) Oral before breakfast  sodium chloride 0.65% Nasal 1 Spray(s) Both Nostrils three times a day  BACItracin   Ointment 1 Application(s) Topical two times a day  carbidopa/levodopa  25/100 2 Tablet(s) Oral every 4 hours  phenytoin   Suspension 100 milliGRAM(s) Oral every 8 hours    MEDICATIONS  (PRN):  LORazepam   Injectable 1 milliGRAM(s) IV Push every 6 hours PRN Agitation    Allergies    sulfa drugs (Unknown)    Intolerances          CAPILLARY BLOOD GLUCOSE        I&O's Summary    2017 07:01  -  2017 07:00  --------------------------------------------------------  IN: 840 mL / OUT: 0 mL / NET: 840 mL    2017 07:01  -  2017 06:43  --------------------------------------------------------  IN: 520 mL / OUT: 0 mL / NET: 520 mL      Daily     Daily     PHYSICAL EXAM:   GENERAL: not in acute distress  RESPIRATORY: CTAB, no w/c   CARDIOVASCULAR: RRR, no murmurs, gallops, rubs  ABDOMINAL: soft, non-tender, (+) PEG  EXTREMITIES: no clubbing, cyanosis, or edema  NEUROLOGICAL: patient is non verbal with resting tremors in both arms  MUSCULOSKELETAL: no gross joint deformity      LABS:                        11.9   5.6   )-----------( 262      ( 2017 06:21 )             37.1                   Urinalysis Basic - ( 2017 10:29 )    Color: Yellow / Appearance: x / S.014 / pH: x  Gluc: x / Ketone: Negative  / Bili: Negative / Urobili: Negative   Blood: x / Protein: Negative / Nitrite: Negative   Leuk Esterase: Negative / RBC: x / WBC 2-5 /HPF   Sq Epi: x / Non Sq Epi: x / Bacteria: Few /HPF        RADIOLOGY & ADDITIONAL TESTS:    Imaging Personally Reviewed:  Yes    Consultant(s) Notes Reviewed:      Care Discussed with Consultants/Other Providers: Patient is a 62y old  Female who presents with a chief complaint of Epistaxis (2017 22:41)      SUBJECTIVE / OVERNIGHT EVENTS:  Pt had no overnight events but today had a RRT called. She was seen in the morning with baseline tremors as per the home health aide and VSS. During later in the morning it was noted that the patient looked cyanotic and had regurgitation heard and concerns for possible aspiration. Symptoms improved with O2 and sinmet started working 20 mins after and patient was back at baseline.      MEDICATIONS  (STANDING):  heparin  Injectable 5000 Unit(s) SubCutaneous every 8 hours  levothyroxine 75 MICROGram(s) Oral daily  pantoprazole   Suspension 40 milliGRAM(s) Oral before breakfast  sodium chloride 0.65% Nasal 1 Spray(s) Both Nostrils three times a day  BACItracin   Ointment 1 Application(s) Topical two times a day  carbidopa/levodopa  25/100 2 Tablet(s) Oral every 4 hours  phenytoin   Suspension 100 milliGRAM(s) Oral every 8 hours    MEDICATIONS  (PRN):  LORazepam   Injectable 1 milliGRAM(s) IV Push every 6 hours PRN Agitation    Allergies    sulfa drugs (Unknown)    Intolerances          CAPILLARY BLOOD GLUCOSE        I&O's Summary    2017 07:  -  2017 07:00  --------------------------------------------------------  IN: 840 mL / OUT: 0 mL / NET: 840 mL    2017 07:  -  2017 06:43  --------------------------------------------------------  IN: 520 mL / OUT: 0 mL / NET: 520 mL      Daily     Daily     PHYSICAL EXAM:   GENERAL: not in acute distress  RESPIRATORY: CTAB, no w/c   CARDIOVASCULAR: RRR, no murmurs, gallops, rubs  ABDOMINAL: soft, non-tender, (+) PEG  EXTREMITIES: no clubbing, cyanosis, or edema  NEUROLOGICAL: patient is non verbal with resting tremors in both arms  MUSCULOSKELETAL: no gross joint deformity      LABS:                        11.9   5.6   )-----------( 262      ( 2017 06:21 )             37.1                   Urinalysis Basic - ( 2017 10:29 )    Color: Yellow / Appearance: x / S.014 / pH: x  Gluc: x / Ketone: Negative  / Bili: Negative / Urobili: Negative   Blood: x / Protein: Negative / Nitrite: Negative   Leuk Esterase: Negative / RBC: x / WBC 2-5 /HPF   Sq Epi: x / Non Sq Epi: x / Bacteria: Few /HPF        RADIOLOGY & ADDITIONAL TESTS:    Imaging Personally Reviewed:  Yes    Consultant(s) Notes Reviewed:      Care Discussed with Consultants/Other Providers:

## 2017-07-25 LAB
ALBUMIN SERPL ELPH-MCNC: 4 G/DL — SIGNIFICANT CHANGE UP (ref 3.3–5)
ALP SERPL-CCNC: 95 U/L — SIGNIFICANT CHANGE UP (ref 40–120)
ALT FLD-CCNC: 6 U/L RC — LOW (ref 10–45)
ANION GAP SERPL CALC-SCNC: 11 MMOL/L — SIGNIFICANT CHANGE UP (ref 5–17)
AST SERPL-CCNC: 26 U/L — SIGNIFICANT CHANGE UP (ref 10–40)
BASOPHILS # BLD AUTO: 0.1 K/UL — SIGNIFICANT CHANGE UP (ref 0–0.2)
BASOPHILS NFR BLD AUTO: 0.8 % — SIGNIFICANT CHANGE UP (ref 0–2)
BILIRUB SERPL-MCNC: 0.1 MG/DL — LOW (ref 0.2–1.2)
BUN SERPL-MCNC: 25 MG/DL — HIGH (ref 7–23)
CALCIUM SERPL-MCNC: 9.6 MG/DL — SIGNIFICANT CHANGE UP (ref 8.4–10.5)
CHLORIDE SERPL-SCNC: 99 MMOL/L — SIGNIFICANT CHANGE UP (ref 96–108)
CO2 SERPL-SCNC: 35 MMOL/L — HIGH (ref 22–31)
CREAT SERPL-MCNC: 0.56 MG/DL — SIGNIFICANT CHANGE UP (ref 0.5–1.3)
EOSINOPHIL # BLD AUTO: 0.1 K/UL — SIGNIFICANT CHANGE UP (ref 0–0.5)
EOSINOPHIL NFR BLD AUTO: 1.8 % — SIGNIFICANT CHANGE UP (ref 0–6)
GLUCOSE SERPL-MCNC: 99 MG/DL — SIGNIFICANT CHANGE UP (ref 70–99)
HCT VFR BLD CALC: 39.6 % — SIGNIFICANT CHANGE UP (ref 34.5–45)
HGB BLD-MCNC: 12.6 G/DL — SIGNIFICANT CHANGE UP (ref 11.5–15.5)
LYMPHOCYTES # BLD AUTO: 1.1 K/UL — SIGNIFICANT CHANGE UP (ref 1–3.3)
LYMPHOCYTES # BLD AUTO: 13.9 % — SIGNIFICANT CHANGE UP (ref 13–44)
MAGNESIUM SERPL-MCNC: 2.5 MG/DL — SIGNIFICANT CHANGE UP (ref 1.6–2.6)
MCHC RBC-ENTMCNC: 31.8 GM/DL — LOW (ref 32–36)
MCHC RBC-ENTMCNC: 33 PG — SIGNIFICANT CHANGE UP (ref 27–34)
MCV RBC AUTO: 104 FL — HIGH (ref 80–100)
MONOCYTES # BLD AUTO: 0.5 K/UL — SIGNIFICANT CHANGE UP (ref 0–0.9)
MONOCYTES NFR BLD AUTO: 6.6 % — SIGNIFICANT CHANGE UP (ref 2–14)
NEUTROPHILS # BLD AUTO: 6 K/UL — SIGNIFICANT CHANGE UP (ref 1.8–7.4)
NEUTROPHILS NFR BLD AUTO: 76.8 % — SIGNIFICANT CHANGE UP (ref 43–77)
PHOSPHATE SERPL-MCNC: 3.7 MG/DL — SIGNIFICANT CHANGE UP (ref 2.5–4.5)
PLATELET # BLD AUTO: 289 K/UL — SIGNIFICANT CHANGE UP (ref 150–400)
POTASSIUM SERPL-MCNC: 4.4 MMOL/L — SIGNIFICANT CHANGE UP (ref 3.5–5.3)
POTASSIUM SERPL-SCNC: 4.4 MMOL/L — SIGNIFICANT CHANGE UP (ref 3.5–5.3)
PROT SERPL-MCNC: 7.9 G/DL — SIGNIFICANT CHANGE UP (ref 6–8.3)
RBC # BLD: 3.82 M/UL — SIGNIFICANT CHANGE UP (ref 3.8–5.2)
RBC # FLD: 13.6 % — SIGNIFICANT CHANGE UP (ref 10.3–14.5)
SODIUM SERPL-SCNC: 145 MMOL/L — SIGNIFICANT CHANGE UP (ref 135–145)
WBC # BLD: 7.8 K/UL — SIGNIFICANT CHANGE UP (ref 3.8–10.5)
WBC # FLD AUTO: 7.8 K/UL — SIGNIFICANT CHANGE UP (ref 3.8–10.5)

## 2017-07-25 PROCEDURE — 99232 SBSQ HOSP IP/OBS MODERATE 35: CPT

## 2017-07-25 PROCEDURE — 99232 SBSQ HOSP IP/OBS MODERATE 35: CPT | Mod: GC

## 2017-07-25 RX ORDER — CLONAZEPAM 1 MG
1 TABLET ORAL
Qty: 0 | Refills: 0 | COMMUNITY
Start: 2017-07-25

## 2017-07-25 RX ORDER — LEVOTHYROXINE SODIUM 125 MCG
1 TABLET ORAL
Qty: 0 | Refills: 0 | COMMUNITY
Start: 2017-07-25

## 2017-07-25 RX ORDER — PANTOPRAZOLE SODIUM 20 MG/1
40 TABLET, DELAYED RELEASE ORAL
Qty: 0 | Refills: 0 | COMMUNITY
Start: 2017-07-25

## 2017-07-25 RX ORDER — BACITRACIN ZINC 500 UNIT/G
1 OINTMENT IN PACKET (EA) TOPICAL
Qty: 0 | Refills: 0 | COMMUNITY
Start: 2017-07-25

## 2017-07-25 RX ORDER — SODIUM CHLORIDE 0.65 %
1 AEROSOL, SPRAY (ML) NASAL
Qty: 0 | Refills: 0 | COMMUNITY
Start: 2017-07-25

## 2017-07-25 RX ORDER — CLONAZEPAM 1 MG
0.5 TABLET ORAL
Qty: 0 | Refills: 0 | Status: DISCONTINUED | OUTPATIENT
Start: 2017-07-25 | End: 2017-07-26

## 2017-07-25 RX ADMIN — HEPARIN SODIUM 5000 UNIT(S): 5000 INJECTION INTRAVENOUS; SUBCUTANEOUS at 14:10

## 2017-07-25 RX ADMIN — Medication 100 MILLIGRAM(S): at 21:49

## 2017-07-25 RX ADMIN — PANTOPRAZOLE SODIUM 40 MILLIGRAM(S): 20 TABLET, DELAYED RELEASE ORAL at 10:10

## 2017-07-25 RX ADMIN — CARBIDOPA AND LEVODOPA 2 TABLET(S): 25; 100 TABLET ORAL at 17:12

## 2017-07-25 RX ADMIN — CARBIDOPA AND LEVODOPA 2 TABLET(S): 25; 100 TABLET ORAL at 10:11

## 2017-07-25 RX ADMIN — Medication 75 MICROGRAM(S): at 05:38

## 2017-07-25 RX ADMIN — Medication 0.5 MILLIGRAM(S): at 17:12

## 2017-07-25 RX ADMIN — CARBIDOPA AND LEVODOPA 2 TABLET(S): 25; 100 TABLET ORAL at 21:48

## 2017-07-25 RX ADMIN — Medication 1 SPRAY(S): at 05:39

## 2017-07-25 RX ADMIN — Medication 1 APPLICATION(S): at 17:12

## 2017-07-25 RX ADMIN — Medication 100 MILLIGRAM(S): at 14:09

## 2017-07-25 RX ADMIN — Medication 1 SPRAY(S): at 14:09

## 2017-07-25 RX ADMIN — Medication 100 MILLIGRAM(S): at 05:40

## 2017-07-25 RX ADMIN — CARBIDOPA AND LEVODOPA 2 TABLET(S): 25; 100 TABLET ORAL at 05:38

## 2017-07-25 RX ADMIN — CARBIDOPA AND LEVODOPA 2 TABLET(S): 25; 100 TABLET ORAL at 14:07

## 2017-07-25 RX ADMIN — Medication 1 APPLICATION(S): at 05:39

## 2017-07-25 RX ADMIN — HEPARIN SODIUM 5000 UNIT(S): 5000 INJECTION INTRAVENOUS; SUBCUTANEOUS at 05:38

## 2017-07-25 RX ADMIN — HEPARIN SODIUM 5000 UNIT(S): 5000 INJECTION INTRAVENOUS; SUBCUTANEOUS at 21:48

## 2017-07-25 RX ADMIN — Medication 1 SPRAY(S): at 21:48

## 2017-07-25 RX ADMIN — CARBIDOPA AND LEVODOPA 2 TABLET(S): 25; 100 TABLET ORAL at 01:45

## 2017-07-25 NOTE — PROGRESS NOTE ADULT - PROBLEM SELECTOR PLAN 8
c/w 5000 units hep sq q8 hours    Dispo: has private home-health aides 24/7, and private pay PT 1xwk. No [facility] services. Pt has home oxygen and home suction machine.  Mother decline need for STAR and would prefer pt return home. pt lives at home with her and her son/Gene. Pt requires complete care for ADL's and ambulation with rolling walker. c/w 5000 units hep sq q8 hours    Dispo: has private home-health aides 24/7, and private pay PT 1xwk. No [facility] services. Pt has home oxygen and home suction machine.  Mother decline need for STAR and would prefer pt return home. pt lives at home with her and her son/Gene. Pt requires complete care for ADLs and ambulation with rolling walker.

## 2017-07-25 NOTE — PROGRESS NOTE ADULT - PROBLEM SELECTOR PLAN 3
-resolved  - No evidence of epistaxis  - Patient was previously intubated in the MICU due to inability to protect airway from epistaxis which has resolved  c/w bacitracin ointment BID and NS Nasal TID at nostrils   CXR negative. instructed nurse to wean down on O2 for goal O2 sat >92  - patient has been saturating well on NC, has home O2 - resolved  - No evidence of epistaxis  - Patient was previously intubated in the MICU due to inability to protect airway from epistaxis which has resolved  c/w bacitracin ointment BID and NS Nasal TID at nostrils   CXR negative. instructed nurse to wean down on O2 for goal O2 sat >92  - patient has been saturating well on NC, has home O2

## 2017-07-25 NOTE — PROGRESS NOTE ADULT - PROBLEM SELECTOR PLAN 2
-Events of this AM likely not seizures. Cont Rx as per Neuro:  - transitioned yesterday from PO Dilantin 300mg Chewable qhs to Suspension Dilantin 100mg q8h, dilantin level 8.0 (7/22) but corrected for albumin therapeutic (goal 10-15)  -Ativan 1mg q6h PRN for agitation   - most recent EEG's (7/18): have been negative for seizures, indicates diffuse or multifocal cerebral dysfxn  -Epilepsy consult deferred for now as Dr. Isaac Valdes is already following -Events of this AM likely not seizures. Cont Rx as per Neuro:  - c/w Dilantin 100mg q8h as per neuro, dilantin level 8.0 (7/22) but corrected for albumin therapeutic (goal 10-15)  -c/w Ativan 1mg q6h PRN for agitation   - most recent EEG's (7/18): have been negative for seizures, indicates diffuse or multifocal cerebral dysfxn  -Epilepsy consult deferred for now as Dr. Isaac Valdes is already following -Events of this AM likely not seizures. Cont Rx as per Neuro:  - c/w Dilantin 100mg q8h as per neuro, dilantin level 8.0 (7/22) but corrected for albumin therapeutic (goal 10-15)  -start Klonopin 0.5 mg BID standing  -c/w Ativan 1mg q6h PRN for agitation   - most recent EEG's (7/18): have been negative for seizures, indicates diffuse or multifocal cerebral dysfxn

## 2017-07-25 NOTE — CONSULT NOTE ADULT - CONSULT REASON
Patient has swollen gums and missing teeth and med team would like evaluation to r/o infection.
seizure like activity
epistaxis

## 2017-07-25 NOTE — PROGRESS NOTE ADULT - PROBLEM SELECTOR PLAN 1
- patient has resting tremors that improve after Sinemet administration  - need neuro input for better control given events of this AM  - c/w Sinemet (carbidopa-levodopa 25/100 q4h) - patient has resting tremors at baseline and improves after Sinemet  - c/w Sinemet (carbidopa-levodopa 25/100 q4h) - patient has resting tremors at baseline and improves after Sinemet  - c/w Sinemet (carbidopa-levodopa 25/100 q4h)  -Adding Klonopin 0.5mg every 12 hours.  Has been on ativan for past >1 year.  Expect this to9 help with severe episodes of tremor

## 2017-07-25 NOTE — PROGRESS NOTE ADULT - ASSESSMENT
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 Neurology following for ongoing seizure and PD management. Persistent b/l UE resting tremor noted on current medication regimen.    - Continue phenytoin 100 mg q8h  - Pt was on Ativan 1 mg five times daily at home. Please start Klonopin 0.5 mg BID standing.  - Continue sinemet 25/100 2 tabs q4h during waking hours. Will discuss case w/ pt's movement disorder physician to determine further medication changes. 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 following for ongoing seizure and PD management. Persistent b/l UE resting tremor noted on current medication regimen.    - Continue phenytoin 300 mg qd  - Pt was per outpt records on Ativan 1 mg five times daily at home standing. Please start Klonopin 0.5 mg BID standing, titrate to TID if intermittent agitation persists.  - Continue sinemet 25/100 2 tabs q4h during waking hours. Will discuss case w/ pt's movement disorder physician (Leo Burns)  to determine further medication changes. could consider inc in dosage, or addition of comtan, if severe intermittent tremors persist

## 2017-07-25 NOTE — CHART NOTE - NSCHARTNOTEFT_GEN_A_CORE
NUTRITION FOLLOW UP NOTE  Pt seen for length of stay.       Source: Patient [ ]    Family [ ]     other [x] RN    Diet : Enteral: Jevity 1.2 @ 60mL/hr x18 hrs.      Per RN Pt tolerating diet well without residuals or water stool.     PO intake:  < 50% [ ] 50-75% [ ]   % [ ]  other :N/A         Enteral /Parenteral Nutrition: Jevity 1.2 @ 60mL/hr x 18 hrs provides 1296kcal, 60g protein (20kcal/kg, 1.37g protein/kg/current Wt)      Current Weight: 7/21: 96.7lbs, 7/13: 106.9lbs  Fluid loss noted, now c no edema.    Pertinent Medications: MEDICATIONS  (STANDING):  heparin  Injectable 5000 Unit(s) SubCutaneous every 8 hours  levothyroxine 75 MICROGram(s) Oral daily  pantoprazole   Suspension 40 milliGRAM(s) Oral before breakfast  sodium chloride 0.65% Nasal 1 Spray(s) Both Nostrils three times a day  BACItracin   Ointment 1 Application(s) Topical two times a day  carbidopa/levodopa  25/100 2 Tablet(s) Oral every 4 hours  phenytoin   Suspension 100 milliGRAM(s) Oral every 8 hours  clonazePAM Tablet 0.5 milliGRAM(s) Oral two times a day    MEDICATIONS  (PRN):  LORazepam   Injectable 1 milliGRAM(s) IV Push every 6 hours PRN Agitation    Pertinent Labs:  07-25 Na145 mmol/L Glu 99 mg/dL K+ 4.4 mmol/L Cr  0.56 mg/dL BUN 25 mg/dL<H> Phos 3.7 mg/dL Alb 4.0 g/dL PAB n/a         Skin: intact.    Pt had RRT for seizure like activity but team does not think it was a seizure.     Estimated Needs:   [x] no change since previous assessment  [ ] recalculated:       Previous Nutrition Diagnosis: [x]  Swallowing difficulty       Nutrition Diagnosis is [x] ongoing, being addressed c enteral nutrition  [ ] resolved [ ] not applicable          New Nutrition Diagnosis: [ x] not applicable       Interventions:     Recommend    [ ] Change Diet To:    [ ] Nutrition Supplement    [x] Nutrition Support: Continue Jevity 1.2 @ 60mL/hr x 18 hrs provides 1296kcal, 60g protein (20kcal/kg, 1.37g protein/kg/current Wt)    [ ] Other:        Monitoring and Evaluation:     [ ] PO intake [ x] Tolerance to diet prescription [ x] weights [ x] follow up per protocol    RD to remain available for further nutritional interventions as indicated/requested by medical team/pt.   David Betts RD, CDN. Pager: [phone]

## 2017-07-25 NOTE — PROGRESS NOTE ADULT - SUBJECTIVE AND OBJECTIVE BOX
Patient is a 62y old  Female who presents with a chief complaint of Epistaxis (23 Jul 2017 22:41)      SUBJECTIVE / OVERNIGHT EVENTS:    MEDICATIONS  (STANDING):  heparin  Injectable 5000 Unit(s) SubCutaneous every 8 hours  levothyroxine 75 MICROGram(s) Oral daily  pantoprazole   Suspension 40 milliGRAM(s) Oral before breakfast  sodium chloride 0.65% Nasal 1 Spray(s) Both Nostrils three times a day  BACItracin   Ointment 1 Application(s) Topical two times a day  carbidopa/levodopa  25/100 2 Tablet(s) Oral every 4 hours  phenytoin   Suspension 100 milliGRAM(s) Oral every 8 hours    MEDICATIONS  (PRN):  LORazepam   Injectable 1 milliGRAM(s) IV Push every 6 hours PRN Agitation    Allergies    sulfa drugs (Unknown)    Intolerances      Vital Signs Last 24 Hrs  T(C): 36.8 (25 Jul 2017 04:44), Max: 36.8 (25 Jul 2017 04:44)  T(F): 98.2 (25 Jul 2017 04:44), Max: 98.2 (25 Jul 2017 04:44)  HR: 71 (25 Jul 2017 04:44) (71 - 85)  BP: 106/62 (25 Jul 2017 04:44) (106/62 - 162/85)  BP(mean): --  RR: 18 (25 Jul 2017 04:44) (18 - 18)  SpO2: 100% (25 Jul 2017 04:44) (100% - 100%)    CAPILLARY BLOOD GLUCOSE  120 (24 Jul 2017 10:53)        I&O's Summary    23 Jul 2017 07:01  -  24 Jul 2017 07:00  --------------------------------------------------------  IN: 860 mL / OUT: 0 mL / NET: 860 mL    24 Jul 2017 07:01  -  25 Jul 2017 06:46  --------------------------------------------------------  IN: 870 mL / OUT: 0 mL / NET: 870 mL      Daily     Daily     PHYSICAL EXAM:   GENERAL: not in acute distress  RESPIRATORY: CTAB, no w/c   CARDIOVASCULAR: RRR, no murmurs, gallops, rubs  ABDOMINAL: soft, non-tender, (+) PEG  EXTREMITIES: no clubbing, cyanosis, or edema  NEUROLOGICAL: patient is non verbal with resting tremors in both arms  MUSCULOSKELETAL: no gross joint deformity    LABS:                        12.2   5.2   )-----------( 266      ( 24 Jul 2017 07:14 )             40.0                       RADIOLOGY & ADDITIONAL TESTS:    Imaging Personally Reviewed:  Yes    Consultant(s) Notes Reviewed:      Care Discussed with Consultants/Other Providers: Patient is a 62y old  Female who presents with a chief complaint of Epistaxis (23 Jul 2017 22:41)      SUBJECTIVE / OVERNIGHT EVENTS:  Subjective: Overnight she was "agitated" as per the nurse with leg and arm movements bilaterally and given 1mg Ativan. As per the home health aide with her this was baseline and happens at night. This morning after her sinemet she was not having any tremors or movements and was able to follow commands and answer questions through blinking and noted no pain anywhere.     MEDICATIONS  (STANDING):  heparin  Injectable 5000 Unit(s) SubCutaneous every 8 hours  levothyroxine 75 MICROGram(s) Oral daily  pantoprazole   Suspension 40 milliGRAM(s) Oral before breakfast  sodium chloride 0.65% Nasal 1 Spray(s) Both Nostrils three times a day  BACItracin   Ointment 1 Application(s) Topical two times a day  carbidopa/levodopa  25/100 2 Tablet(s) Oral every 4 hours  phenytoin   Suspension 100 milliGRAM(s) Oral every 8 hours    MEDICATIONS  (PRN):  LORazepam   Injectable 1 milliGRAM(s) IV Push every 6 hours PRN Agitation    Allergies    sulfa drugs (Unknown)    Intolerances      Vital Signs Last 24 Hrs  T(C): 36.8 (25 Jul 2017 04:44), Max: 36.8 (25 Jul 2017 04:44)  T(F): 98.2 (25 Jul 2017 04:44), Max: 98.2 (25 Jul 2017 04:44)  HR: 71 (25 Jul 2017 04:44) (71 - 85)  BP: 106/62 (25 Jul 2017 04:44) (106/62 - 162/85)  BP(mean): --  RR: 18 (25 Jul 2017 04:44) (18 - 18)  SpO2: 100% (25 Jul 2017 04:44) (100% - 100%)    CAPILLARY BLOOD GLUCOSE  120 (24 Jul 2017 10:53)        I&O's Summary    23 Jul 2017 07:01  -  24 Jul 2017 07:00  --------------------------------------------------------  IN: 860 mL / OUT: 0 mL / NET: 860 mL    24 Jul 2017 07:01  -  25 Jul 2017 06:46  --------------------------------------------------------  IN: 870 mL / OUT: 0 mL / NET: 870 mL      Daily     Daily     PHYSICAL EXAM:   GENERAL: not in acute distress  RESPIRATORY: CTAB, no w/c   CARDIOVASCULAR: RRR, no murmurs, gallops, rubs  ABDOMINAL: soft, non-tender, (+) PEG  EXTREMITIES: no clubbing, cyanosis, or edema  NEUROLOGICAL: patient is non verbal with resting tremors in both arms  MUSCULOSKELETAL: no gross joint deformity    LABS:                        12.2   5.2   )-----------( 266      ( 24 Jul 2017 07:14 )             40.0                       RADIOLOGY & ADDITIONAL TESTS:    Imaging Personally Reviewed:  Yes    Consultant(s) Notes Reviewed:      Care Discussed with Consultants/Other Providers: Patient is a 62y old  Female who presents with a chief complaint of Epistaxis (23 Jul 2017 22:41)      SUBJECTIVE / OVERNIGHT EVENTS:  Subjective: Overnight she was "agitated" as per the nurse with leg and arm movements bilaterally and given 1mg Ativan. As per the home health aide with her this was baseline and happens at night. This morning after her sinemet she was not having any tremors or movements and was able to follow commands and answer questions through blinking and noted no pain anywhere.     MEDICATIONS  (STANDING):  heparin  Injectable 5000 Unit(s) SubCutaneous every 8 hours  levothyroxine 75 MICROGram(s) Oral daily  pantoprazole   Suspension 40 milliGRAM(s) Oral before breakfast  sodium chloride 0.65% Nasal 1 Spray(s) Both Nostrils three times a day  BACItracin   Ointment 1 Application(s) Topical two times a day  carbidopa/levodopa  25/100 2 Tablet(s) Oral every 4 hours  phenytoin   Suspension 100 milliGRAM(s) Oral every 8 hours    MEDICATIONS  (PRN):  LORazepam   Injectable 1 milliGRAM(s) IV Push every 6 hours PRN Agitation    Allergies    sulfa drugs (Unknown)    Intolerances      Vital Signs Last 24 Hrs  T(C): 36.8 (25 Jul 2017 04:44), Max: 36.8 (25 Jul 2017 04:44)  T(F): 98.2 (25 Jul 2017 04:44), Max: 98.2 (25 Jul 2017 04:44)  HR: 71 (25 Jul 2017 04:44) (71 - 85)  BP: 106/62 (25 Jul 2017 04:44) (106/62 - 162/85)  BP(mean): --  RR: 18 (25 Jul 2017 04:44) (18 - 18)  SpO2: 100% (25 Jul 2017 04:44) (100% - 100%)    CAPILLARY BLOOD GLUCOSE  120 (24 Jul 2017 10:53)        I&O's Summary    23 Jul 2017 07:01  -  24 Jul 2017 07:00  --------------------------------------------------------  IN: 860 mL / OUT: 0 mL / NET: 860 mL    24 Jul 2017 07:01 - 25 Jul 2017 06:46  --------------------------------------------------------  IN: 870 mL / OUT: 0 mL / NET: 870 mL      Daily     Daily     PHYSICAL EXAM:   GENERAL: not in acute distress  RESPIRATORY: CTAB, no w/c   CARDIOVASCULAR: RRR, no murmurs, gallops, rubs  ABDOMINAL: soft, non-tender, (+) PEG  EXTREMITIES: no clubbing, cyanosis, or edema  NEUROLOGICAL: patient is non verbal with resting tremors in both arms  MUSCULOSKELETAL: no gross joint deformity    LABS:                        12.2   5.2   )-----------( 266      ( 24 Jul 2017 07:14 )             40.0                       RADIOLOGY & ADDITIONAL TESTS:    Imaging Personally Reviewed:  Yes    Consultant(s) Notes Reviewed:  Neurology    Care Discussed with Consultants/Other Providers: Dr Valdes of Neurology

## 2017-07-25 NOTE — PROGRESS NOTE ADULT - ASSESSMENT
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 Patient was transferred to medical floors on 7/21/17 and is now stable.   Episode of clonic arm movement with mouth movement and blinking.  Not responsive during event.  Difficult to determine if this was seizure activity (complex partial) or course parkinsons.  was hypoxic during event.  episode resolved without intervention

## 2017-07-25 NOTE — CONSULT NOTE ADULT - SUBJECTIVE AND OBJECTIVE BOX
Patient is a 62y old  Female who presents with a chief complaint of Epistaxis (23 Jul 2017 22:41)      HPI:  62-y F with cognitive impairment, TBI c/b ICH, Drug-induced Parkinson's Disease (neuroleptic use), nonverbal at baseline, s/p PEG placement for aspiration, Depression, Schizophrenia, Hypothyroidism, GERD, BIBEMS for evaluation of epistaxis.  Patient has had multiple hospitalization in past several years for pneumonia due to aspiration.  At home patient noticed blood in her airway while doing suctioning.  (requires chronic suctioning to maintain airway).  She went to ER and there was noted to have acute desaturation and altered mental status, placed on BIPAP. However, pt was emergently intubated in setting 2/2 to hypoxia and hypercapnia. Of note patient has a history of nasal surgery in the past/ unclear exactly what was performed.     VS in the ED:   T:?, HR:116, BP:177/102, RR: 32, 83% O2 on BIPAP    Labs personally reviewed: CBC unremarkable, PTT/INR WNL, CMP significant for a Potassium of 5.5 but hemolyzed, AST of 56, ALT of 8, pH of 7.18 on VBG, Lactate of 3.6, pCO2 of 105, pO2 of 39. CE negative (13 Jul 2017 12:10)      PAST MEDICAL & SURGICAL HISTORY:  Cognitive impairment  Traumatic brain injury, without loss of consciousness, sequela  Mitral valve prolapse  Major depressive disorder, recurrent episode, unspecified severity  Gastroesophageal reflux disease, esophagitis presence not specified  Hypothyroidism  Depression  Incontinence of urine  Parkinson's disease (tremor, stiffness, slow motion, unstable posture)  Scoliosis  Appendix disease  Intestinal disorder  Pharynx disease  Tonsil and adenoid disease, chronic  Hernia      MEDICATIONS  (STANDING):  heparin  Injectable 5000 Unit(s) SubCutaneous every 8 hours  levothyroxine 75 MICROGram(s) Oral daily  pantoprazole   Suspension 40 milliGRAM(s) Oral before breakfast  sodium chloride 0.65% Nasal 1 Spray(s) Both Nostrils three times a day  BACItracin   Ointment 1 Application(s) Topical two times a day  carbidopa/levodopa  25/100 2 Tablet(s) Oral every 4 hours  phenytoin   Suspension 100 milliGRAM(s) Oral every 8 hours  clonazePAM Tablet 0.5 milliGRAM(s) Oral two times a day    MEDICATIONS  (PRN):  LORazepam   Injectable 1 milliGRAM(s) IV Push every 6 hours PRN Agitation      Allergies    sulfa drugs (Unknown)    Intolerances        FAMILY HISTORY:  Family history of arthritis (Sibling)  Family history of Alzheimer's disease      *SOCIAL HISTORY: patient presents with home aid    *Last Dental Visit:unknown    Vital Signs Last 24 Hrs  T(C): 36.5 (25 Jul 2017 13:28), Max: 36.8 (25 Jul 2017 04:44)  T(F): 97.7 (25 Jul 2017 13:28), Max: 98.2 (25 Jul 2017 04:44)  HR: 81 (25 Jul 2017 13:28) (71 - 81)  BP: 139/82 (25 Jul 2017 13:28) (106/62 - 139/82)  BP(mean): --  RR: 18 (25 Jul 2017 13:28) (18 - 18)  SpO2: 99% (25 Jul 2017 13:28) (99% - 100%)    EOE:  TMJ: unable to evaluate, patient uncooperative             ( -  ) LAD             ( -  ) swelling             ( -  ) asymmetry             ( -  ) palpation    IOE:  permanent dentition: multiple missing teeth  Difficult to evaluate patient, residents could not get patient to open mouth; patient uncooperative and combative. Home aid opened patient's mouth as best as she could, but exam was very limited.  Multiple fixed restorations present, erythematous gingiva in mandibular anterior sextant.    Radiographs: none taken    LABS:                        12.6   7.8   )-----------( 289      ( 25 Jul 2017 07:43 )             39.6     07-25    145  |  99  |  25<H>  ----------------------------<  99  4.4   |  35<H>  |  0.56    Ca    9.6      25 Jul 2017 07:43  Phos  3.7     07-25  Mg     2.5     07-25    TPro  7.9  /  Alb  4.0  /  TBili  0.1<L>  /  DBili  x   /  AST  26  /  ALT  6<L>  /  AlkPhos  95  07-25    WBC Count: 7.8 K/uL [3.8 - 10.5] (07-25 @ 07:43)    Platelet Count - Automated: 289 K/uL [150 - 400] (07-25 @ 07:43)  Platelet Count - Automated: 266 K/uL [150 - 400] (07-24 @ 07:14)      ASSESSMENT: Patient's home aid requested dental consult because they noticed patient's oral cavity was in poor condition. Limited exam revealed that the gingiva is erythematous and inflamed and should be followed up by an outpatient dentist.     RECOMMENDATIONS:   1) Dental F/U with outpatient dentist for comprehensive dental care.   2) If any difficulty swallowing/breathing, fever occur, page dental.     Frances Kwok DDS, Sumanth Koenig DDS, pager #[phone]

## 2017-07-25 NOTE — PROGRESS NOTE ADULT - PROBLEM SELECTOR PLAN 4
- patient is non- verbal so unable to confirm patient still has these symptoms.   -UA is not consistent with UTI  -UC 7/22: NGTD -resolved   -patient is non- verbal so unable to confirm patient still has these symptoms.   -UA is not consistent with UTI  -UC 7/22: NGTD

## 2017-07-26 VITALS
RESPIRATION RATE: 18 BRPM | TEMPERATURE: 98 F | DIASTOLIC BLOOD PRESSURE: 69 MMHG | HEART RATE: 65 BPM | OXYGEN SATURATION: 97 % | SYSTOLIC BLOOD PRESSURE: 114 MMHG

## 2017-07-26 LAB
ALBUMIN SERPL ELPH-MCNC: 3.8 G/DL — SIGNIFICANT CHANGE UP (ref 3.3–5)
ALP SERPL-CCNC: 96 U/L — SIGNIFICANT CHANGE UP (ref 40–120)
ALT FLD-CCNC: 7 U/L RC — LOW (ref 10–45)
ANION GAP SERPL CALC-SCNC: 9 MMOL/L — SIGNIFICANT CHANGE UP (ref 5–17)
AST SERPL-CCNC: 30 U/L — SIGNIFICANT CHANGE UP (ref 10–40)
BASOPHILS # BLD AUTO: 0 K/UL — SIGNIFICANT CHANGE UP (ref 0–0.2)
BASOPHILS NFR BLD AUTO: 1 % — SIGNIFICANT CHANGE UP (ref 0–2)
BILIRUB SERPL-MCNC: 0.2 MG/DL — SIGNIFICANT CHANGE UP (ref 0.2–1.2)
BUN SERPL-MCNC: 23 MG/DL — SIGNIFICANT CHANGE UP (ref 7–23)
CALCIUM SERPL-MCNC: 9.4 MG/DL — SIGNIFICANT CHANGE UP (ref 8.4–10.5)
CHLORIDE SERPL-SCNC: 100 MMOL/L — SIGNIFICANT CHANGE UP (ref 96–108)
CO2 SERPL-SCNC: 34 MMOL/L — HIGH (ref 22–31)
CREAT SERPL-MCNC: 0.56 MG/DL — SIGNIFICANT CHANGE UP (ref 0.5–1.3)
EOSINOPHIL # BLD AUTO: 0.1 K/UL — SIGNIFICANT CHANGE UP (ref 0–0.5)
EOSINOPHIL NFR BLD AUTO: 3.1 % — SIGNIFICANT CHANGE UP (ref 0–6)
GLUCOSE SERPL-MCNC: 77 MG/DL — SIGNIFICANT CHANGE UP (ref 70–99)
HCT VFR BLD CALC: 40.1 % — SIGNIFICANT CHANGE UP (ref 34.5–45)
HGB BLD-MCNC: 12.2 G/DL — SIGNIFICANT CHANGE UP (ref 11.5–15.5)
LYMPHOCYTES # BLD AUTO: 0.7 K/UL — LOW (ref 1–3.3)
LYMPHOCYTES # BLD AUTO: 14.9 % — SIGNIFICANT CHANGE UP (ref 13–44)
MAGNESIUM SERPL-MCNC: 2.5 MG/DL — SIGNIFICANT CHANGE UP (ref 1.6–2.6)
MCHC RBC-ENTMCNC: 30.5 GM/DL — LOW (ref 32–36)
MCHC RBC-ENTMCNC: 31.5 PG — SIGNIFICANT CHANGE UP (ref 27–34)
MCV RBC AUTO: 103 FL — HIGH (ref 80–100)
MONOCYTES # BLD AUTO: 0.4 K/UL — SIGNIFICANT CHANGE UP (ref 0–0.9)
MONOCYTES NFR BLD AUTO: 7.7 % — SIGNIFICANT CHANGE UP (ref 2–14)
NEUTROPHILS # BLD AUTO: 3.5 K/UL — SIGNIFICANT CHANGE UP (ref 1.8–7.4)
NEUTROPHILS NFR BLD AUTO: 73.3 % — SIGNIFICANT CHANGE UP (ref 43–77)
PHOSPHATE SERPL-MCNC: 3.9 MG/DL — SIGNIFICANT CHANGE UP (ref 2.5–4.5)
PLATELET # BLD AUTO: 264 K/UL — SIGNIFICANT CHANGE UP (ref 150–400)
POTASSIUM SERPL-MCNC: 4.2 MMOL/L — SIGNIFICANT CHANGE UP (ref 3.5–5.3)
POTASSIUM SERPL-SCNC: 4.2 MMOL/L — SIGNIFICANT CHANGE UP (ref 3.5–5.3)
PROT SERPL-MCNC: 7.3 G/DL — SIGNIFICANT CHANGE UP (ref 6–8.3)
RBC # BLD: 3.88 M/UL — SIGNIFICANT CHANGE UP (ref 3.8–5.2)
RBC # FLD: 13.4 % — SIGNIFICANT CHANGE UP (ref 10.3–14.5)
SODIUM SERPL-SCNC: 143 MMOL/L — SIGNIFICANT CHANGE UP (ref 135–145)
WBC # BLD: 4.8 K/UL — SIGNIFICANT CHANGE UP (ref 3.8–10.5)
WBC # FLD AUTO: 4.8 K/UL — SIGNIFICANT CHANGE UP (ref 3.8–10.5)

## 2017-07-26 PROCEDURE — 83605 ASSAY OF LACTIC ACID: CPT

## 2017-07-26 PROCEDURE — 83550 IRON BINDING TEST: CPT

## 2017-07-26 PROCEDURE — 84295 ASSAY OF SERUM SODIUM: CPT

## 2017-07-26 PROCEDURE — 85730 THROMBOPLASTIN TIME PARTIAL: CPT

## 2017-07-26 PROCEDURE — 86850 RBC ANTIBODY SCREEN: CPT

## 2017-07-26 PROCEDURE — 85610 PROTHROMBIN TIME: CPT

## 2017-07-26 PROCEDURE — 82803 BLOOD GASES ANY COMBINATION: CPT

## 2017-07-26 PROCEDURE — 82947 ASSAY GLUCOSE BLOOD QUANT: CPT

## 2017-07-26 PROCEDURE — 85014 HEMATOCRIT: CPT

## 2017-07-26 PROCEDURE — 96374 THER/PROPH/DIAG INJ IV PUSH: CPT | Mod: XU

## 2017-07-26 PROCEDURE — 95951: CPT

## 2017-07-26 PROCEDURE — 80202 ASSAY OF VANCOMYCIN: CPT

## 2017-07-26 PROCEDURE — 82553 CREATINE MB FRACTION: CPT

## 2017-07-26 PROCEDURE — 82565 ASSAY OF CREATININE: CPT

## 2017-07-26 PROCEDURE — 84146 ASSAY OF PROLACTIN: CPT

## 2017-07-26 PROCEDURE — 81001 URINALYSIS AUTO W/SCOPE: CPT

## 2017-07-26 PROCEDURE — 99239 HOSP IP/OBS DSCHRG MGMT >30: CPT

## 2017-07-26 PROCEDURE — 84100 ASSAY OF PHOSPHORUS: CPT

## 2017-07-26 PROCEDURE — 71045 X-RAY EXAM CHEST 1 VIEW: CPT

## 2017-07-26 PROCEDURE — 85027 COMPLETE CBC AUTOMATED: CPT

## 2017-07-26 PROCEDURE — 31500 INSERT EMERGENCY AIRWAY: CPT

## 2017-07-26 PROCEDURE — 93005 ELECTROCARDIOGRAM TRACING: CPT | Mod: XU

## 2017-07-26 PROCEDURE — 94003 VENT MGMT INPAT SUBQ DAY: CPT

## 2017-07-26 PROCEDURE — 84132 ASSAY OF SERUM POTASSIUM: CPT

## 2017-07-26 PROCEDURE — 99291 CRITICAL CARE FIRST HOUR: CPT | Mod: 25

## 2017-07-26 PROCEDURE — 94002 VENT MGMT INPAT INIT DAY: CPT

## 2017-07-26 PROCEDURE — 84443 ASSAY THYROID STIM HORMONE: CPT

## 2017-07-26 PROCEDURE — 94660 CPAP INITIATION&MGMT: CPT

## 2017-07-26 PROCEDURE — 80185 ASSAY OF PHENYTOIN TOTAL: CPT

## 2017-07-26 PROCEDURE — 94640 AIRWAY INHALATION TREATMENT: CPT

## 2017-07-26 PROCEDURE — 87040 BLOOD CULTURE FOR BACTERIA: CPT

## 2017-07-26 PROCEDURE — 95819 EEG AWAKE AND ASLEEP: CPT

## 2017-07-26 PROCEDURE — 84145 PROCALCITONIN (PCT): CPT

## 2017-07-26 PROCEDURE — 70450 CT HEAD/BRAIN W/O DYE: CPT

## 2017-07-26 PROCEDURE — 86900 BLOOD TYPING SEROLOGIC ABO: CPT

## 2017-07-26 PROCEDURE — 82728 ASSAY OF FERRITIN: CPT

## 2017-07-26 PROCEDURE — 82607 VITAMIN B-12: CPT

## 2017-07-26 PROCEDURE — 82330 ASSAY OF CALCIUM: CPT

## 2017-07-26 PROCEDURE — 95957 EEG DIGITAL ANALYSIS: CPT

## 2017-07-26 PROCEDURE — 86901 BLOOD TYPING SEROLOGIC RH(D): CPT

## 2017-07-26 PROCEDURE — 87086 URINE CULTURE/COLONY COUNT: CPT

## 2017-07-26 PROCEDURE — 82550 ASSAY OF CK (CPK): CPT

## 2017-07-26 PROCEDURE — 82435 ASSAY OF BLOOD CHLORIDE: CPT

## 2017-07-26 PROCEDURE — 80053 COMPREHEN METABOLIC PANEL: CPT

## 2017-07-26 PROCEDURE — 83735 ASSAY OF MAGNESIUM: CPT

## 2017-07-26 PROCEDURE — 84484 ASSAY OF TROPONIN QUANT: CPT

## 2017-07-26 RX ORDER — CLONAZEPAM 1 MG
1 TABLET ORAL
Qty: 60 | Refills: 0 | OUTPATIENT
Start: 2017-07-26 | End: 2017-08-25

## 2017-07-26 RX ORDER — CLONAZEPAM 1 MG
1 TABLET ORAL
Qty: 14 | Refills: 0 | OUTPATIENT
Start: 2017-07-26 | End: 2017-08-02

## 2017-07-26 RX ADMIN — PANTOPRAZOLE SODIUM 40 MILLIGRAM(S): 20 TABLET, DELAYED RELEASE ORAL at 09:31

## 2017-07-26 RX ADMIN — Medication 1 SPRAY(S): at 14:14

## 2017-07-26 RX ADMIN — Medication 75 MICROGRAM(S): at 06:47

## 2017-07-26 RX ADMIN — CARBIDOPA AND LEVODOPA 2 TABLET(S): 25; 100 TABLET ORAL at 14:14

## 2017-07-26 RX ADMIN — HEPARIN SODIUM 5000 UNIT(S): 5000 INJECTION INTRAVENOUS; SUBCUTANEOUS at 14:14

## 2017-07-26 RX ADMIN — Medication 1 MILLIGRAM(S): at 04:23

## 2017-07-26 RX ADMIN — Medication 1 APPLICATION(S): at 06:47

## 2017-07-26 RX ADMIN — CARBIDOPA AND LEVODOPA 2 TABLET(S): 25; 100 TABLET ORAL at 02:54

## 2017-07-26 RX ADMIN — Medication 1 SPRAY(S): at 06:47

## 2017-07-26 RX ADMIN — Medication 0.5 MILLIGRAM(S): at 09:31

## 2017-07-26 RX ADMIN — Medication 100 MILLIGRAM(S): at 14:14

## 2017-07-26 RX ADMIN — CARBIDOPA AND LEVODOPA 2 TABLET(S): 25; 100 TABLET ORAL at 09:31

## 2017-07-26 RX ADMIN — Medication 100 MILLIGRAM(S): at 06:48

## 2017-07-26 RX ADMIN — CARBIDOPA AND LEVODOPA 2 TABLET(S): 25; 100 TABLET ORAL at 06:47

## 2017-07-26 RX ADMIN — HEPARIN SODIUM 5000 UNIT(S): 5000 INJECTION INTRAVENOUS; SUBCUTANEOUS at 06:47

## 2017-07-26 NOTE — PROGRESS NOTE ADULT - PROBLEM SELECTOR PLAN 2
-Events of this AM likely not seizures. Cont Rx as per Neuro:  - c/w Dilantin 100mg q8h as per neuro, dilantin level 8.0 (7/22) but corrected for albumin therapeutic (goal 10-15)  -start Klonopin 0.5 mg BID standing  -c/w Ativan 1mg q6h PRN for agitation   - most recent EEG's (7/18): have been negative for seizures, indicates diffuse or multifocal cerebral dysfxn

## 2017-07-26 NOTE — PROGRESS NOTE ADULT - SUBJECTIVE AND OBJECTIVE BOX
Patient is a 62y old  Female who presents with a chief complaint of Epistaxis (23 Jul 2017 22:41)      SUBJECTIVE / OVERNIGHT EVENTS:    MEDICATIONS  (STANDING):  heparin  Injectable 5000 Unit(s) SubCutaneous every 8 hours  levothyroxine 75 MICROGram(s) Oral daily  pantoprazole   Suspension 40 milliGRAM(s) Oral before breakfast  sodium chloride 0.65% Nasal 1 Spray(s) Both Nostrils three times a day  BACItracin   Ointment 1 Application(s) Topical two times a day  carbidopa/levodopa  25/100 2 Tablet(s) Oral every 4 hours  phenytoin   Suspension 100 milliGRAM(s) Oral every 8 hours  clonazePAM Tablet 0.5 milliGRAM(s) Oral two times a day    MEDICATIONS  (PRN):  LORazepam   Injectable 1 milliGRAM(s) IV Push every 6 hours PRN Agitation    Allergies    sulfa drugs (Unknown)    Intolerances        CAPILLARY BLOOD GLUCOSE        I&O's Summary    24 Jul 2017 07:01  -  25 Jul 2017 07:00  --------------------------------------------------------  IN: 1590 mL / OUT: 0 mL / NET: 1590 mL    25 Jul 2017 07:01  -  26 Jul 2017 06:34  --------------------------------------------------------  IN: 1240 mL / OUT: 0 mL / NET: 1240 mL      Daily     Daily     PHYSICAL EXAM:   GENERAL: not in acute distress  RESPIRATORY: CTAB, no w/c   CARDIOVASCULAR: RRR, no murmurs, gallops, rubs  ABDOMINAL: soft, non-tender, (+) PEG  EXTREMITIES: no clubbing, cyanosis, or edema  NEUROLOGICAL: patient is non verbal with resting tremors in both arms  MUSCULOSKELETAL: no gross joint deformity      LABS:                        12.6   7.8   )-----------( 289      ( 25 Jul 2017 07:43 )             39.6     07-25    145  |  99  |  25<H>  ----------------------------<  99  4.4   |  35<H>  |  0.56    Ca    9.6      25 Jul 2017 07:43  Phos  3.7     07-25  Mg     2.5     07-25    TPro  7.9  /  Alb  4.0  /  TBili  0.1<L>  /  DBili  x   /  AST  26  /  ALT  6<L>  /  AlkPhos  95  07-25    LIVER FUNCTIONS - ( 25 Jul 2017 07:43 )  Alb: 4.0 g/dL / Pro: 7.9 g/dL / ALK PHOS: 95 U/L / ALT: 6 U/L RC / AST: 26 U/L / GGT: x                     RADIOLOGY & ADDITIONAL TESTS:    Imaging Personally Reviewed:  Yes    Consultant(s) Notes Reviewed:      Care Discussed with Consultants/Other Providers: Patient is a 62y old  Female who presents with a chief complaint of Epistaxis (23 Jul 2017 22:41)      SUBJECTIVE / OVERNIGHT EVENTS:  Subjective: No overnight events and patient seen and examined this morning    MEDICATIONS  (STANDING):  heparin  Injectable 5000 Unit(s) SubCutaneous every 8 hours  levothyroxine 75 MICROGram(s) Oral daily  pantoprazole   Suspension 40 milliGRAM(s) Oral before breakfast  sodium chloride 0.65% Nasal 1 Spray(s) Both Nostrils three times a day  BACItracin   Ointment 1 Application(s) Topical two times a day  carbidopa/levodopa  25/100 2 Tablet(s) Oral every 4 hours  phenytoin   Suspension 100 milliGRAM(s) Oral every 8 hours  clonazePAM Tablet 0.5 milliGRAM(s) Oral two times a day    MEDICATIONS  (PRN):  LORazepam   Injectable 1 milliGRAM(s) IV Push every 6 hours PRN Agitation    Allergies    sulfa drugs (Unknown)    Intolerances        CAPILLARY BLOOD GLUCOSE        I&O's Summary    24 Jul 2017 07:01  -  25 Jul 2017 07:00  --------------------------------------------------------  IN: 1590 mL / OUT: 0 mL / NET: 1590 mL    25 Jul 2017 07:01  -  26 Jul 2017 06:34  --------------------------------------------------------  IN: 1240 mL / OUT: 0 mL / NET: 1240 mL      Daily     Daily     PHYSICAL EXAM:   GENERAL: not in acute distress  RESPIRATORY: CTAB, no w/c   CARDIOVASCULAR: RRR, no murmurs, gallops, rubs  ABDOMINAL: soft, non-tender, (+) PEG  EXTREMITIES: no clubbing, cyanosis, or edema  NEUROLOGICAL: patient is non verbal with resting tremors in both arms  MUSCULOSKELETAL: no gross joint deformity      LABS:                        12.6   7.8   )-----------( 289      ( 25 Jul 2017 07:43 )             39.6     07-25    145  |  99  |  25<H>  ----------------------------<  99  4.4   |  35<H>  |  0.56    Ca    9.6      25 Jul 2017 07:43  Phos  3.7     07-25  Mg     2.5     07-25    TPro  7.9  /  Alb  4.0  /  TBili  0.1<L>  /  DBili  x   /  AST  26  /  ALT  6<L>  /  AlkPhos  95  07-25    LIVER FUNCTIONS - ( 25 Jul 2017 07:43 )  Alb: 4.0 g/dL / Pro: 7.9 g/dL / ALK PHOS: 95 U/L / ALT: 6 U/L RC / AST: 26 U/L / GGT: x                     RADIOLOGY & ADDITIONAL TESTS:    Imaging Personally Reviewed:  Yes    Consultant(s) Notes Reviewed:      Care Discussed with Consultants/Other Providers: Patient is a 62y old  Female who presents with a chief complaint of Epistaxis (23 Jul 2017 22:41)      SUBJECTIVE / OVERNIGHT EVENTS:  Subjective: No overnight events and patient seen and examined this morning and able to blink through her eye communications for questions on pain anywhere.     MEDICATIONS  (STANDING):  heparin  Injectable 5000 Unit(s) SubCutaneous every 8 hours  levothyroxine 75 MICROGram(s) Oral daily  pantoprazole   Suspension 40 milliGRAM(s) Oral before breakfast  sodium chloride 0.65% Nasal 1 Spray(s) Both Nostrils three times a day  BACItracin   Ointment 1 Application(s) Topical two times a day  carbidopa/levodopa  25/100 2 Tablet(s) Oral every 4 hours  phenytoin   Suspension 100 milliGRAM(s) Oral every 8 hours  clonazePAM Tablet 0.5 milliGRAM(s) Oral two times a day    MEDICATIONS  (PRN):  LORazepam   Injectable 1 milliGRAM(s) IV Push every 6 hours PRN Agitation    Allergies    sulfa drugs (Unknown)    Intolerances        CAPILLARY BLOOD GLUCOSE        I&O's Summary    24 Jul 2017 07:01  -  25 Jul 2017 07:00  --------------------------------------------------------  IN: 1590 mL / OUT: 0 mL / NET: 1590 mL    25 Jul 2017 07:01  -  26 Jul 2017 06:34  --------------------------------------------------------  IN: 1240 mL / OUT: 0 mL / NET: 1240 mL      Daily     Daily     PHYSICAL EXAM:   GENERAL: not in acute distress  RESPIRATORY: CTAB, no w/c   CARDIOVASCULAR: RRR, no murmurs, gallops, rubs  ABDOMINAL: soft, non-tender, (+) PEG  EXTREMITIES: no clubbing, cyanosis, or edema  NEUROLOGICAL: patient is non verbal with resting tremors in both arms  MUSCULOSKELETAL: no gross joint deformity      LABS:                        12.6   7.8   )-----------( 289      ( 25 Jul 2017 07:43 )             39.6     07-25    145  |  99  |  25<H>  ----------------------------<  99  4.4   |  35<H>  |  0.56    Ca    9.6      25 Jul 2017 07:43  Phos  3.7     07-25  Mg     2.5     07-25    TPro  7.9  /  Alb  4.0  /  TBili  0.1<L>  /  DBili  x   /  AST  26  /  ALT  6<L>  /  AlkPhos  95  07-25    LIVER FUNCTIONS - ( 25 Jul 2017 07:43 )  Alb: 4.0 g/dL / Pro: 7.9 g/dL / ALK PHOS: 95 U/L / ALT: 6 U/L RC / AST: 26 U/L / GGT: x                     RADIOLOGY & ADDITIONAL TESTS:    Imaging Personally Reviewed:  Yes    Consultant(s) Notes Reviewed:      Care Discussed with Consultants/Other Providers:

## 2017-07-26 NOTE — PROGRESS NOTE ADULT - PROBLEM SELECTOR PLAN 3
- resolved  - No evidence of epistaxis  - Patient was previously intubated in the MICU due to inability to protect airway from epistaxis which has resolved  c/w bacitracin ointment BID and NS Nasal TID at nostrils   CXR negative. instructed nurse to wean down on O2 for goal O2 sat >92  - patient has been saturating well on NC, has home O2

## 2017-07-26 NOTE — PROGRESS NOTE ADULT - PROBLEM SELECTOR PLAN 8
c/w 5000 units hep sq q8 hours    Dispo: has private home-health aides 24/7, and private pay PT 1xwk. No [facility] services. Pt has home oxygen and home suction machine.  Mother decline need for STAR and would prefer pt return home. pt lives at home with her and her son/Gene. Pt requires complete care for ADLs and ambulation with rolling walker. c/w 5000 units hep sq q8 hours    Dispo: has private home-health aides 24/7, and private pay PT 1xwk. No [facility] services. Pt has home oxygen and home suction machine.  Mother decline need for STAR and would prefer pt return home. pt lives at home with her and her son/Gene. Pt requires complete care for ADLs and ambulation with rolling walker.  -Dental F/U with outpatient dentist for comprehensive dental care

## 2017-07-26 NOTE — PROGRESS NOTE ADULT - PROBLEM SELECTOR PLAN 1
- patient has resting tremors at baseline and improves after Sinemet  - c/w Sinemet (carbidopa-levodopa 25/100 q4h)  -Adding Klonopin 0.5mg every 12 hours.  Has been on ativan for past >1 year.  Expect this to9 help with severe episodes of tremor - patient has resting tremors at baseline and improves after Sinemet  - c/w Sinemet (carbidopa-levodopa 25/100 q4h)  -Added Klonopin 0.5mg every 12 hours.  Has been on ativan for past >1 year.  Much improved now, cont current Rx

## 2017-07-26 NOTE — PROGRESS NOTE ADULT - PROBLEM SELECTOR PLAN 4
-resolved   -patient is non- verbal so unable to confirm patient still has these symptoms.   -UA is not consistent with UTI  -UC 7/22: NGTD

## 2017-07-26 NOTE — PROGRESS NOTE ADULT - I WAS PHYSICALLY PRESENT FOR THE KEY PORTIONS OF THE EVALUATION AND MANAGEMENT (E/M) SERVICE PROVIDED.  I AGREE WITH THE ABOVE HISTORY, PHYSICAL, AND PLAN WHICH I HAVE REVIEWED AND EDITED WHERE APPROPRIATE
Statement Selected

## 2017-07-26 NOTE — PROGRESS NOTE ADULT - PROBLEM SELECTOR PROBLEM 5
Hypothyroidism
R/O Gastroesophageal reflux disease, esophagitis presence not specified

## 2017-07-26 NOTE — PROGRESS NOTE ADULT - PROVIDER SPECIALTY LIST ADULT
ENT
Internal Medicine
MICU
Neurology
Internal Medicine

## 2017-07-26 NOTE — PROGRESS NOTE ADULT - PROBLEM SELECTOR PROBLEM 6
R/O Gastroesophageal reflux disease, esophagitis presence not specified
Prophylactic measure

## 2017-07-27 ENCOUNTER — MEDICATION RENEWAL (OUTPATIENT)
Age: 62
End: 2017-07-27

## 2017-07-27 NOTE — DIETITIAN INITIAL EVALUATION ADULT. - NUTRITION DIAGNOSTIC TERMINOLOGY #1
[facility]    Transplant Infectious Diseases Inpatient Progress note      Camacho Bhagat MRN# [id number]   YOB: 1962 Age: 54 year old   Date of Admission: 7/4/2017  4:45 AM  Transplant: 3/4/2017 (Liver), Postoperative day: 145            Recommendations:   1. Continue broad spectrum meropenem/daptomycin/micafungin.   2. Continue GCV IV but at dose to 2.5 mg/kg q12 hr. (higher than recommended per package insert for possible resistance)  3. Quantitative CMV PCR and CMV resistance test (ordered for you).   4. Repeat quantitative CMV PCR 8/3/2017.    5. EBV PCR every other week (next on 8/1/2017).  6. CPK weekly (next 8/1/2017).     Critically ill patient.         Summary of Presentation:   Transplants:  3/4/2017 (Liver), Postoperative day:  145     This patient is a 54 year old male with CASTAÑEDA s/p OLT in 3/2017. Basilixima for induction.   MMF was held due to neutropenia and multiple infections upon admission. TAC was held 7/25/2017 for perforated viscus.      Presented with colitis, s/p exploratory laparotomy. Attributed to neutropenic colitis.   Then started to develop CMV viremia (primary infection)  Now with perforated viscus.         Active Problems and Infectious Diseases Issues:   1. Severe sepsis picture 7/25/2017.   2. Probable perforated viscus with abdominal abscess.    Continue broad spectrum ABx with meropenem/daptomycin/micafungin.   Check weekly CPK while on daptomycin.   Underwent exp lap with hemicolectomy on 7/26/2017.     3. Neutropenic vs CMV vs PTLD colitis (without ischemia per exp lap upon admission)   4. CMV primary infection (donor derived)  5. EBV primary infection (donor derived)  The initial colitis at presentation on admission was thought to be due to neutropenia-induced colitis. Later during admission CMV viremia ensued and the possibility of CMV colitis was entertained, the CMV viremia was at low level and colonoscopy was recommended by ID to  ascertain whether the patient has end organ damage with CMV colitis. Colonoscopy done on 7/21/2017 with poor prep and negative random biopsies.   Finished 14 days of ertapenem on 7/18/2017 for colitis. The neutropenia was attributed to drugs (MMF/bactrim/VGCV, all were DCed prior to presentation).   Now with perforated viscus and peritoneal gas on broad spectrum ABx.     The primary CMV infection is donor derived following discontinuation of VGCV and in part due to acute illness as well.   Whether the patient also has CMV colitis is not clear, random colon biopsies were negative. Will wait for the pathology from the hemicolectomy procedure.   The patient needs to be treated for primary CMV infection anyway due to CMV viral load above the threshold of 1500 IU/mL.   IV GCV was initiated 7/20/2017, the dose was increased to high dose therapy since the viremia occurred while on and off VGCV for ppx and potential resistance is possible.   We should continue to follow weekly CMV PCR. I will check CMV PCR today with CMV resistance panel.     Random colon biopsies were also not suggestive of PTLD.   Continue to monitor EBV PCR every other week.   Ascitic fluid cytology and leukemia/lymphoma studies negative.   MRI of brain not suggestive of CNS PTLD.   The pathology of the hemicolectomy should help ruling out/in PTLD.     6. EJ   Due to severe sepsis.   We are adjusting the doses of ABx and antiviral accordingly.     7. Encephalopathy.   Due to sepsis.   With negative MRI of brain for lesions.     8. 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    These are likely all colonizers or contaminants.   The patient was started on daptomycin for severe sepsis and colonization with VRE.     9. Staphylococcus capitis in ascitic fluid 7/5/2017   Contamination.     10. Rhinovirus in BAL 7/15/2017  Likely to be due to chronic shedding since the  patient's main presentation was mostly abdominal not respiratory.      Other Infectious Disease issues include  - PCP prophylaxis: bactrim DCed in June of 2017 due to neutropenia.   - Serostatus: CMV D+/R-, EBV D+/R-, HSV1?/2?, VZV ?   Now he's on GCV IV.   - Immunization status: up-to-date  - Gamma globulin status: >1660 as of 7/24/2017.       Attestation:  I interviewed the patient and obtained history from the patient, and by reviewing the patient's chart including outside records, microbiological data, and radiological data. All data are summarized in this notes.  Naseem FERGUSONDuy Figueroa   Pager: [phone]  07/27/2017        Interim History:  7/27/2017: no new events, the OR on 7/27/2017 noted.   Still intubated and sedated.     7/21/2017: Underwent colonoscopy but with poor prep, it was poor study. The patient started to spike intermittent low grade fevers.    7/25/2017: Patient is obtunded, shallow breathing. Looking septic. Was transferred to SICU and was intubated.   7/26/2017: underwent exp lap with right hemicolectomy, end ileostomy, and mucus fistula.     HPI:  In summary:  CASTAÑEDA s/p OLT in 3/2017.   Presented with abdominal pain and underwent exploratory laparotomy which was not c/w with ischemia. It was felt to be related to neutropenia.   The patient has neutropenia since June of 2017 attributed to immunosuppressive therapy/bactrim/VGCV.   Bactrim DCed June of 2017 and VGCV was DCed at unknown tome.     The patient was started on broad spectrum ABx with persistent fever.   Bronchoscopy done 7/12/2017 grew single colony of VRE which was considered a colonizer.   Ascitic fluid checked on 7/5/2017 and grew S capitis considered a contaminant.   Repeat BAL on 7/15/2017 and repeat paracentesis on 7/7/14/2017 were unremarkable for growth but the ascitic fluid was exudative.     The course was complicated by thrombosis of the right radial artery with ischemia to to the tip of the right index finger.   He also has  ischemia to the right hallux and second toe.     The patient finished a course of ertapenem, his mental status started to improve.   CMV PCR became detected at low level and workup for possible CMV colitis was initiated. Colonoscopy was with poor prep and random biopsies were negative for colitis including CMV colitis. GCV was initiated on 7/20/2017 (repeat CMV PCR was around 1900 IU/mL).     The patient started to spike low grade fever on 7/21/2017, on 7/25/2017 he suffered from acute respiratory distress and became obtunded with distended abdomen. He was transferred to ICU and was intubated. The patient was started on daptomycin/meropenem/micafungin. GCV dose was increased   CT c/a/p showed large RUQ intraabdominal extraluminal gas with possible feculent material.   He was taken to OR on 7/26/2017 and underwent exp lap with right hemicolectomy, end ileostomy, and mucus fistula.         ROS:  ROS was unobtainalbe due the patient's poor mentation and intubation.                  Pysical Examination:  Temp: 99  F (37.2  C) Temp src: Axillary BP: 125/76   Heart Rate: 101 Resp: 21 SpO2: 98 % O2 Device: Mechanical Ventilator    Vitals:    07/19/17 0936 07/24/17 0254 07/25/17 0900 07/26/17 0400   Weight: 107 kg (235 lb 12.8 oz) 106.9 kg (235 lb 11.2 oz) 112 kg (246 lb 14.4 oz) 106.2 kg (234 lb 2.1 oz)    07/27/17 0400   Weight: 99 kg (218 lb 4.1 oz)     Constitutional: intubated and sedated.   CVS: tachycardic, normal S1/S2, no murmur.   Lungs: CTA bilaterally anteriorly, no accessory muscle use.   Abdomen: less distended, no masses, no bruit, absent BS, midline wound is intact with no dehiscence or erythema and no drainage, RLQ fistula.   Extremities: +2 pitting edema of bilateral lower extremities, ischemic right hallux and right second toe, also ischemic tip of left second toe, and right index, no ulcers.   Genitalia: vazquez catheter in place.   Skin: as the exam of the extremities and the abdomen, no induration,  fluctuation or discharge,and no rash       Medications:  Medications that Require Transfusion:     lactated ringers 100 mL/hr at 07/27/17 0029     fentaNYL 100 mcg/hr (07/27/17 0400)     propofol (DIPRIVAN) infusion 60 mcg/kg/min (07/27/17 0608)     insulin (regular) Stopped (07/25/17 2028)     IV fluid REPLACEMENT ONLY 25 mL/hr at 07/20/17 0430   Scheduled Medications:     ganciclovir (CYTOVENE) intermittent infusion  2.5 mg/kg (Dosing Weight) Intravenous Q12H     meropenem  1 g Intravenous Q12H     DAPTOmycin (CUBICIN) intermittent infusion  8 mg/kg (Dosing Weight) Intravenous Q24H     micafungin  100 mg Intravenous Q24H     albumin human  25 g Intravenous Q6H     aspirin  80 mg Oral Daily     multivitamins with minerals  15 mL Per Feeding Tube Daily     heparin  5,000 Units Subcutaneous Q8H     pantoprazole  40 mg Oral or Feeding Tube Daily     sodium chloride (PF)  3 mL Intracatheter Q8H       Laboratory Data:   No results found for: ACD4    Inflammatory Markers    Recent Labs   Lab Test  07/05/17   1810  03/05/17   0358  11/23/16   0813  11/16/16   0706  11/15/16   1039  11/07/16   0759  11/03/16   0949  11/01/16   0853   08/15/11   1151  04/11/11   1209  01/03/11   1246  02/15/10   1232   SED   --    --   45*   --    --    --    --    --    --   11  14  17*  25*   CRP  354.0  20.0*  58.0*  59.1*  49.8*  66.0*  140.0*  150.0*   < >  11.1*  9.0*  11.7*  17.5*    < > = values in this interval not displayed.       Immune Globulin Studies     Recent Labs   Lab Test  07/24/17   0705  08/15/11   1243   IGG  1660*  1160   IGG1   --   734   IGG2   --   294   IGG3   --   7*   IGG4   --   59       Metabolic Studies       Recent Labs   Lab Test  07/27/17   0410  07/26/17   2243  07/26/17   2133  07/26/17   2046  07/26/17   2017  07/26/17   0522  07/26/17   0431  07/25/17   1651  07/25/17   0945   07/24/17   0705   07/06/17   0316   NA  142  140  139  139  139  Canceled, Test credited   Incorrect specimen type     --    140  138  137   --   142   < >  143   POTASSIUM  3.5  3.5  3.5  3.5  3.4  Canceled, Test credited   Incorrect specimen type     --   3.5  4.4  4.0   --   3.8   < >  5.0   CHLORIDE  108  108   --    --    --    --   108  106  104   --   112*   < >  116*   CO2  20  21   --    --    --    --   23  24  26   --   24   < >  18*   ANIONGAP  14  11   --    --    --    --   9  8  7   --   6   < >  9   BUN  72*  75*   --    --    --    --   74*  79*  77*   --   65*   < >  62*   CR  2.82*  2.76*   --    --    --    --   3.09*  2.90*  2.60*   --   1.88*   < >  2.75*   GFRESTIMATED  24*  24*   --    --    --    --   21*  23*  26*   --   38*   < >  24*   GLC  164*  151*  155*  151*  149*  Canceled, Test credited   Incorrect specimen type     --   129*  176*  382*   --   226*   < >  139*   A1C   --    --    --    --    --    --    --    --    --    --    --    --   Canceled, Test credited   Below Assay Range  NOTIFIED LEONARD ONEILL AT 0538 ON 7/6/17 BY CHRISSY     JACOB  7.9*  7.6*   --    --    --    --   7.9*  8.0*  7.6*   --   7.8*   < >  6.9*   PHOS  5.8*   --    --    --    --    --   4.1  4.8*   --    --    --    < >  5.5*   MAG  2.0   --    --    --    --    --   1.9  1.9   --    --    --    < >  2.1   LACT   --   1.0   --    --    --   0.6*   --   2.3*   --    < >   --    < >  1.5   CKT   --    --    --    --    --    --    --    --   40   --    --    --    --     < > = values in this interval not displayed.       Hepatic Studies    Recent Labs   Lab Test  07/27/17   0410  07/25/17   1651  07/25/17   0945  07/25/17   0017  07/24/17   0705  07/23/17   0625  07/21/17   0615   07/11/17   0328   07/05/17   1810   BILITOTAL  1.3  0.6  0.5   --   0.4  0.3  0.5   < >  0.5   < >   --    ALKPHOS  143  242*  317*   --   264*  224*  297*   < >  158*   < >   --    ALBUMIN  2.4*  2.0*  1.9*   --   1.7*  1.9*  1.9*   < >  1.8*   < >   --    AST  27  61*  90*   --   86*  63*  75*   < >  34   < >   --    ALT  27  50  60   --   54  47  51    < >  27   < >   --    LDH   --    --    --   258*   --    --    --    --    --    --   289*   GGT   --    --    --    --    --    --    --    --   58   --    --     < > = values in this interval not displayed.       Pancreatitis testing    Recent Labs   Lab Test  07/24/17   0705  07/17/17   0630  07/12/17   0342  07/10/17   0340  07/05/17   0346  03/05/17   0358  03/03/17   1458  02/21/17   1520  12/09/16   1159  02/08/16   1144   09/30/10   1734   AMYLASE   --    --    --    --    --   53  70   --    --    --    --   82   LIPASE   --    --    --    --    --   216   --   326  161   --    --   138   TRIG  303*  168*  114  177*  174*   --    --    --    --   99   < >   --     < > = values in this interval not displayed.       Hematology Studies      Recent Labs   Lab Test  07/27/17   0410  07/26/17   2243  07/26/17   2133  07/26/17   2046  07/26/17 2017 07/26/17   1150   07/26/17   0431  07/25/17   1651  07/25/17   0945  07/24/17   0705  07/23/17   0625  07/22/17   0638   WBC  10.0  10.3   --    --    --    --    --   7.7  10.3  11.9*  6.4  4.9  4.5   ANEU  7.9   --    --    --    --    --    --   5.6   --   10.8*  3.7  2.3  2.3   ALYM  1.6   --    --    --    --    --    --   1.6   --   0.4*  2.3  2.1  1.8   ZINA  0.4   --    --    --    --    --    --   0.3   --   0.3  0.3  0.3  0.4   AEOS  0.1   --    --    --    --    --    --   0.1   --   0.1  0.1  0.1  0.1   HGB  9.3*  9.1*  9.5*  9.6*  9.8*  Canceled, Test credited   Incorrect specimen type    6.7*   < >  5.9*  7.5*  8.8*  7.8*  6.9*  7.4*   HCT  27.7*  27.5*   --    --    --    --    --   17.6*  23.4*  27.6*  24.8*  22.6*  24.1*   PLT  144*  136*   --    --   138*   --    --   109*  141*  168  126*  120*  136*    < > = values in this interval not displayed.       Arterial Blood Gas Testing    Recent Labs   Lab Test  07/26/17   2243  07/26/17   2133  07/26/17   2046  07/26/17   2017  07/25/17   1746  07/25/17   1037  07/08/17   0902   PH  Incorrect specimen  type  NOTTIED BY WOJCIECH DEWITT, NEW ORDER TO REPLACE.7/26/17 AT 2346 BY ZAINAB.  CORRECTED ON 07/26 AT 2350: PREVIOUSLY REPORTED AS 7.27     --    --   Canceled, Test credited   Incorrect specimen type    7.32*  7.14*  7.32*   PCO2  Incorrect specimen type  NOTTIED BY WOJCIECH DEWITT, NEW ORDER TO REPLACE.7/26/17 AT 2346 BY ZAINAB.  CORRECTED ON 07/26 AT 2350: PREVIOUSLY REPORTED AS 45     --    --   Canceled, Test credited   Incorrect specimen type    42  69*  35   PO2  Incorrect specimen type  NOTTIED BY WOJCIECH DEWITT, NEW ORDER TO REPLACE.7/26/17 AT 2346 BY ZAINAB.  CORRECTED ON 07/26 AT 2350: PREVIOUSLY REPORTED AS 53     --    --   Canceled, Test credited   Incorrect specimen type    81  103  96   HCO3  Incorrect specimen type  NOTTIED BY WOJCIECH DEWITT, NEW ORDER TO REPLACE.7/26/17 AT 2346 BY ZAINAB.  CORRECTED ON 07/26 AT 2350: PREVIOUSLY REPORTED AS 20     --    --   Canceled, Test credited   Incorrect specimen type    22  23  18*   O2PER  Incorrect specimen type  NOTTIED BY WOJCIECH DEWITT, NEW ORDER TO REPLACE.7/26/17 AT 2346 BY .  CORRECTED ON 07/26 AT 2350: PREVIOUSLY REPORTED AS 40    40  62  100  100.0  100  40.0  7L  30        Urine Studies     Recent Labs   Lab Test  07/25/17   1205  07/19/17   1150  07/11/17   1105  07/06/17   1441  06/06/17   1605   URINEPH  5.0  5.0  5.0  5.0  5.0   NITRITE  Negative  Negative  Negative  Negative  Negative   LEUKEST  Trace*  Negative  Negative  Trace*  Negative   WBCU  5*  2  <1  9*  1       Vancomycin Levels     Recent Labs   Lab Test  07/06/17   0316  10/28/16   1405   VANCOMYCIN  22.1  14.4       Tacrolimus levels    Invalid input(s): TACROLIMUS, TAC, TACR  Transplant Immunosuppression Labs Latest Ref Rng & Units 7/27/2017 7/26/2017 7/26/2017 7/25/2017 7/25/2017   Tacro Level 5.0 - 15.0 ug/L - - - - -   Tacro Level - - - - - -   Creat 0.66 - 1.25 mg/dL 2.82(H) 2.76(H) 3.09(H) 2.90(H) 2.60(H)   BUN 7 - 30 mg/dL 72(H) 75(H) 74(H) 79(H) 77(H)   WBC 4.0 - 11.0 10e9/L 10.0 10.3 7.7  10.3 11.9(H)   Neutrophil % 79.1 - 73.2 - 91.0   ANEU 1.6 - 8.3 10e9/L 7.9 - 5.6 - 10.8(H)       CSF testing     Recent Labs   Lab Test  06/11/09   0225   CWBC  1   CRBC  2   CGLU  104*   CTP  54         Microbiology:  Ascites  7/26/2017 negative to date    7/25/2017 negative to date.     7/5/2017 S capitis    BAL   7/15/2017 yeast and Rhinovirus     7/12/2017 single colony of VRE, C albicans/dubliniensis   Sputum  7/11/2017 VRE and Coag Neg Staph     Bcx   7/25/2017 negative to date.     7/15/2017 negative     Last check of C difficile  C Diff Toxin B PCR   Date Value Ref Range Status   10/27/2016  NEG Final    Negative  Negative: Clostridium difficile target DNA sequences NOT detected, presumed   negative for Clostridium difficile toxin B or the number of bacteria present   may be below the limit of detection for the test.   FDA approved assay performed using [employer] GeneXpert real-time PCR.   A negative result does not exclude actual disease due to Clostridium difficile   and may be due to improper collection, handling and storage of the specimen or   the number of organisms in the specimen is below the detection limit of the   assay.         Virology:  CMV viral loads    Recent Labs   Lab Test  07/20/17   1427  07/18/17   0530  07/15/17   1553  07/10/17   0340  07/03/17   1032   CSPEC  Plasma  Plasma  Bronchoalveolar Lavage  EDTA PLASMA  Plasma, EDTA anticoagulant   CMVLOG  3.3*  3.0*  3.6*  2.2*  <2.1       CMV viral loads    Log IU/mL of CMVQNT   Date Value Ref Range Status   07/20/2017 3.3 (H) <2.1 [Log_IU]/mL Final   07/18/2017 3.0 (H) <2.1 [Log_IU]/mL Final   07/15/2017 3.6 (H) <2.1 [Log_IU]/mL Final   07/10/2017 2.2 (H) <2.1 [Log_IU]/mL Final   07/03/2017 <2.1 <2.1 [Log_IU]/mL Final   06/26/2017 Not Calculated <2.1 [Log_IU]/mL Final       CMV resistance testing  No lab results found.  No results found for: CMVCID, CMVFOS, CMVGAN    USCNS Invalid input(s): USCN, USCNS    USCNS No components found for: USCN,  USS      No results found for: H6RES    EBV DNA Copies/mL   Date Value Ref Range Status   07/18/2017 174765 (A) EBVNEG [Copies]/mL Final         Pathology   Colon biopsies 7/21/2017   A: Colon biopsy, ascending   B: Colon biopsy, descending   FINAL DIAGNOSIS:   A. COLON BIOPSY, ASCENDING:   - Colonic mucosa with no significant histologic abnormality   - Negative for intraepithelial lymphocytosis or deposition of   subepithelial thick collagenous band   B. COLON BIOPSY, DESCENDING:   - Crypt architecture distortion, consistent with healed prior injury   - Negative for active inflammation or dysplasia   - Negative for intraepithelial lymphocytosis or deposition of   subepithelial thick collagenous band     Cytology of ascitic fluid 7/25/2017   PERITONEAL FLUID, ASCITES:   -Negative for malignancy   Cytology of ascitic fluid 7/14/2017.   Peritoneal fluid, ascites:   - Negative for malignancy   - Acute and chronic inflammation present   Specimen Adequacy: Satisfactory for evaluation.   Ascitic fluid leukemia/lymphoma 7/14/2017.   INTERPRETATION:   Ascites fluid:        Rare to absent viable B cells     COMMENT:   This specimen was collected on 7/14/17 but was not ordered/delivered to   lab/run until 7/18/17 - results should be interpreted with caution due   to low cellularity/viability.     Due to limited cellularity we were only able to analyze the B cells.   There is no immunophenotypic evidence of B cell non-Hodgkin lymphoma.   Neoplastic cells, including large cells, may not survive specimen   processing. This sample may not be representative. Final interpretation   requires correlation with morphologic and clinical features.       Imaging:  CXR 7/25/2017 reviewed by myself   Impression:   1. Right IJ catheter tip projects over the superior cavoatrial  junction.  2. Mild cardiomegaly with small bilateral pleural effusions.  3. Bibasilar atelectasis versus consolidation.    CT c/a/p 7/25/2017 reviewed by myself.    IMPRESSION:   1. New large heterogeneous area of right-sided retroperitoneal gas  which is highly concerning for an infectious process. Given the  history of prior neutropenic colitis and biopsies, there could also be  a component of stool from a ruptured viscus, despite the lack of  surrounding bowel loops and no extraluminal oral contrast. Surgical  consultation is recommended.   2. Bibasilar atelectasis versus consolidation with small bilateral  pleural effusions.  3. Extensive mesenteric and soft tissue edema with large volume  ascites. Numerous mesenteric and retroperitoneal lymph nodes which are  presumably reactive.  4. Postsurgical changes of liver transplant.  CT c/a/p7/13/2017 Reviewed by myself.   IMPRESSION:   1. Improved moderate right-sided pleural effusion and resolution of  left-sided pleural effusion. There remain large areas of  atelectasis/consolidation in both lungs, including in the left lower  lobe despite resolution of left-sided pleural effusion. Superimposed  infectious process cannot be excluded.  2. Moderate-large amount of ascites increased from 7/8/2017, which  makes it difficult to evaluate for the presence or absence of  inflammatory change or infectious process in the abdomen or pelvis. No  intra-abdominal abscess.  3. Stable small presumed hematoma within the ventral abdominal wall  fat.  4. Splenomegaly.    MRI brain 7/20/2017.   Impression:  1. Significant image degradation due to motion artifact, with no  definite acute intracranial pathology. No abnormal contrast enhancing  intracranial lesions.  2. Mucosal thickening in the sphenoid and maxillary sinuses and left  greater than right mastoid fluid are nonspecific in the setting of  recent intubation.    Naseem Figueroa  Pager: [phone]         Functional

## 2017-09-05 ENCOUNTER — RX RENEWAL (OUTPATIENT)
Age: 62
End: 2017-09-05

## 2017-09-05 ENCOUNTER — APPOINTMENT (OUTPATIENT)
Dept: NEUROLOGY | Facility: CLINIC | Age: 62
End: 2017-09-05
Payer: MEDICARE

## 2017-09-05 VITALS
WEIGHT: 106 LBS | BODY MASS INDEX: 16.06 KG/M2 | HEIGHT: 68 IN | HEART RATE: 76 BPM | DIASTOLIC BLOOD PRESSURE: 57 MMHG | SYSTOLIC BLOOD PRESSURE: 89 MMHG

## 2017-09-05 PROCEDURE — 99213 OFFICE O/P EST LOW 20 MIN: CPT

## 2017-10-25 ENCOUNTER — RX RENEWAL (OUTPATIENT)
Age: 62
End: 2017-10-25

## 2017-11-01 ENCOUNTER — MESSAGE (OUTPATIENT)
Age: 62
End: 2017-11-01

## 2017-11-12 ENCOUNTER — EMERGENCY (EMERGENCY)
Facility: HOSPITAL | Age: 62
LOS: 1 days | Discharge: HOME | End: 2017-11-12
Attending: EMERGENCY MEDICINE | Admitting: EMERGENCY MEDICINE
Payer: MEDICARE

## 2017-11-12 VITALS
DIASTOLIC BLOOD PRESSURE: 80 MMHG | SYSTOLIC BLOOD PRESSURE: 143 MMHG | OXYGEN SATURATION: 96 % | RESPIRATION RATE: 20 BRPM | HEART RATE: 80 BPM

## 2017-11-12 LAB
ALBUMIN SERPL ELPH-MCNC: 3.9 G/DL — SIGNIFICANT CHANGE UP (ref 3.3–5)
ALP SERPL-CCNC: 125 U/L — HIGH (ref 40–120)
ALT FLD-CCNC: 6 U/L RC — LOW (ref 10–45)
ANION GAP SERPL CALC-SCNC: 10 MMOL/L — SIGNIFICANT CHANGE UP (ref 5–17)
APPEARANCE UR: CLEAR — SIGNIFICANT CHANGE UP
AST SERPL-CCNC: 42 U/L — HIGH (ref 10–40)
BASOPHILS # BLD AUTO: 0 K/UL — SIGNIFICANT CHANGE UP (ref 0–0.2)
BASOPHILS NFR BLD AUTO: 0 % — SIGNIFICANT CHANGE UP (ref 0–2)
BILIRUB SERPL-MCNC: 0.2 MG/DL — SIGNIFICANT CHANGE UP (ref 0.2–1.2)
BILIRUB UR-MCNC: NEGATIVE — SIGNIFICANT CHANGE UP
BUN SERPL-MCNC: 18 MG/DL — SIGNIFICANT CHANGE UP (ref 7–23)
CALCIUM SERPL-MCNC: 9 MG/DL — SIGNIFICANT CHANGE UP (ref 8.4–10.5)
CHLORIDE SERPL-SCNC: 86 MMOL/L — LOW (ref 96–108)
CO2 SERPL-SCNC: 35 MMOL/L — HIGH (ref 22–31)
COLOR SPEC: YELLOW — SIGNIFICANT CHANGE UP
CREAT SERPL-MCNC: 0.5 MG/DL — SIGNIFICANT CHANGE UP (ref 0.5–1.3)
DIFF PNL FLD: NEGATIVE — SIGNIFICANT CHANGE UP
EOSINOPHIL # BLD AUTO: 0.1 K/UL — SIGNIFICANT CHANGE UP (ref 0–0.5)
EOSINOPHIL NFR BLD AUTO: 1.8 % — SIGNIFICANT CHANGE UP (ref 0–6)
GLUCOSE SERPL-MCNC: 96 MG/DL — SIGNIFICANT CHANGE UP (ref 70–99)
GLUCOSE UR QL: NEGATIVE — SIGNIFICANT CHANGE UP
HCT VFR BLD CALC: 43 % — SIGNIFICANT CHANGE UP (ref 34.5–45)
HGB BLD-MCNC: 13.9 G/DL — SIGNIFICANT CHANGE UP (ref 11.5–15.5)
KETONES UR-MCNC: NEGATIVE — SIGNIFICANT CHANGE UP
LEUKOCYTE ESTERASE UR-ACNC: NEGATIVE — SIGNIFICANT CHANGE UP
LIDOCAIN IGE QN: 43 U/L — SIGNIFICANT CHANGE UP (ref 7–60)
LYMPHOCYTES # BLD AUTO: 0.9 K/UL — LOW (ref 1–3.3)
LYMPHOCYTES # BLD AUTO: 15 % — SIGNIFICANT CHANGE UP (ref 13–44)
MAGNESIUM SERPL-MCNC: 2 MG/DL — SIGNIFICANT CHANGE UP (ref 1.6–2.6)
MCHC RBC-ENTMCNC: 32.3 GM/DL — SIGNIFICANT CHANGE UP (ref 32–36)
MCHC RBC-ENTMCNC: 32.4 PG — SIGNIFICANT CHANGE UP (ref 27–34)
MCV RBC AUTO: 100 FL — SIGNIFICANT CHANGE UP (ref 80–100)
MONOCYTES # BLD AUTO: 0.6 K/UL — SIGNIFICANT CHANGE UP (ref 0–0.9)
MONOCYTES NFR BLD AUTO: 10.4 % — SIGNIFICANT CHANGE UP (ref 2–14)
NEUTROPHILS # BLD AUTO: 4.2 K/UL — SIGNIFICANT CHANGE UP (ref 1.8–7.4)
NEUTROPHILS NFR BLD AUTO: 72.8 % — SIGNIFICANT CHANGE UP (ref 43–77)
NITRITE UR-MCNC: NEGATIVE — SIGNIFICANT CHANGE UP
PH UR: 7.5 — SIGNIFICANT CHANGE UP (ref 5–8)
PHENYTOIN FREE SERPL-MCNC: 27.1 UG/ML — HIGH (ref 10–20)
PHOSPHATE SERPL-MCNC: 3.7 MG/DL — SIGNIFICANT CHANGE UP (ref 2.5–4.5)
PLATELET # BLD AUTO: 199 K/UL — SIGNIFICANT CHANGE UP (ref 150–400)
POTASSIUM SERPL-MCNC: 4.5 MMOL/L — SIGNIFICANT CHANGE UP (ref 3.5–5.3)
POTASSIUM SERPL-SCNC: 4.5 MMOL/L — SIGNIFICANT CHANGE UP (ref 3.5–5.3)
PROT SERPL-MCNC: 7.2 G/DL — SIGNIFICANT CHANGE UP (ref 6–8.3)
PROT UR-MCNC: NEGATIVE — SIGNIFICANT CHANGE UP
RBC # BLD: 4.29 M/UL — SIGNIFICANT CHANGE UP (ref 3.8–5.2)
RBC # FLD: 13.4 % — SIGNIFICANT CHANGE UP (ref 10.3–14.5)
RBC CASTS # UR COMP ASSIST: SIGNIFICANT CHANGE UP /HPF (ref 0–2)
SODIUM SERPL-SCNC: 131 MMOL/L — LOW (ref 135–145)
SP GR SPEC: 1.01 — SIGNIFICANT CHANGE UP (ref 1.01–1.02)
UROBILINOGEN FLD QL: NEGATIVE — SIGNIFICANT CHANGE UP
WBC # BLD: 5.8 K/UL — SIGNIFICANT CHANGE UP (ref 3.8–10.5)
WBC # FLD AUTO: 5.8 K/UL — SIGNIFICANT CHANGE UP (ref 3.8–10.5)
WBC UR QL: SIGNIFICANT CHANGE UP /HPF (ref 0–5)

## 2017-11-12 PROCEDURE — 84100 ASSAY OF PHOSPHORUS: CPT

## 2017-11-12 PROCEDURE — 80185 ASSAY OF PHENYTOIN TOTAL: CPT

## 2017-11-12 PROCEDURE — 71045 X-RAY EXAM CHEST 1 VIEW: CPT

## 2017-11-12 PROCEDURE — 74177 CT ABD & PELVIS W/CONTRAST: CPT | Mod: 26

## 2017-11-12 PROCEDURE — 74177 CT ABD & PELVIS W/CONTRAST: CPT

## 2017-11-12 PROCEDURE — 99284 EMERGENCY DEPT VISIT MOD MDM: CPT | Mod: 25,GC

## 2017-11-12 PROCEDURE — 99284 EMERGENCY DEPT VISIT MOD MDM: CPT | Mod: 25

## 2017-11-12 PROCEDURE — 93005 ELECTROCARDIOGRAM TRACING: CPT

## 2017-11-12 PROCEDURE — 80053 COMPREHEN METABOLIC PANEL: CPT

## 2017-11-12 PROCEDURE — 83690 ASSAY OF LIPASE: CPT

## 2017-11-12 PROCEDURE — 87086 URINE CULTURE/COLONY COUNT: CPT

## 2017-11-12 PROCEDURE — 85027 COMPLETE CBC AUTOMATED: CPT

## 2017-11-12 PROCEDURE — 84484 ASSAY OF TROPONIN QUANT: CPT

## 2017-11-12 PROCEDURE — 81001 URINALYSIS AUTO W/SCOPE: CPT

## 2017-11-12 PROCEDURE — 83735 ASSAY OF MAGNESIUM: CPT

## 2017-11-12 PROCEDURE — 71010: CPT | Mod: 26

## 2017-11-12 PROCEDURE — 93010 ELECTROCARDIOGRAM REPORT: CPT

## 2017-11-12 RX ORDER — LACTULOSE 10 G/15ML
30 SOLUTION ORAL ONCE
Qty: 0 | Refills: 0 | Status: COMPLETED | OUTPATIENT
Start: 2017-11-12 | End: 2017-11-12

## 2017-11-12 RX ORDER — LACTULOSE 10 G/15ML
30 SOLUTION ORAL
Qty: 180 | Refills: 0 | OUTPATIENT
Start: 2017-11-12

## 2017-11-12 RX ORDER — SODIUM CHLORIDE 9 MG/ML
500 INJECTION INTRAMUSCULAR; INTRAVENOUS; SUBCUTANEOUS ONCE
Qty: 0 | Refills: 0 | Status: COMPLETED | OUTPATIENT
Start: 2017-11-12 | End: 2017-11-12

## 2017-11-12 RX ADMIN — Medication 1 ENEMA: at 09:58

## 2017-11-12 RX ADMIN — SODIUM CHLORIDE 1000 MILLILITER(S): 9 INJECTION INTRAMUSCULAR; INTRAVENOUS; SUBCUTANEOUS at 10:39

## 2017-11-12 RX ADMIN — LACTULOSE 30 GRAM(S): 10 SOLUTION ORAL at 12:15

## 2017-11-12 NOTE — ED PROVIDER NOTE - GASTROINTESTINAL, MLM
Abdomen soft, distended w/o tenderness. PEG in place, no surrounding erythema. Rectal exam w/ moderate amount of stool that was able to be removed. Chaperone Janay AN

## 2017-11-12 NOTE — ED PROVIDER NOTE - OBJECTIVE STATEMENT
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 Per aid, patient had few episodes last night where the finger pulse ox dropped to 70s and orbits became blue discolored. States responded to 3.5 L NC. Patient occasionally does this - they are unable to visualize a waveform b/c just a finger pulse ox. Also reports abd distension and no BM in 4 days. Been getting feeds Q4H. Normally has 2x BM a day. No recent seizures. Patient does require frequent suction per prior notes, but aids state have not had to suction her recently. patient NPO. Seizes daily. 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 Per aid, patient had few episodes last night where the finger pulse ox dropped to 70s and orbits became blue discolored. States responded to 3.5 L NC. Patient occasionally does this - they are unable to visualize a waveform b/c just a finger pulse ox. Also reports abd distension and no BM in 4 days. Been getting feeds Q4H. Normally has 2x BM/day. Patient does require frequent suction per prior notes, but aids state have not had to suction her recently. patient NPO. Seizes daily.

## 2017-11-12 NOTE — ED PROVIDER NOTE - CARE PLAN
Principal Discharge DX:	Constipation  Instructions for follow-up, activity and diet:	1) Please return to the ED should you have any new or worsening symptoms, worsening pain, develop fever, abdominal distension, or any concerning symptoms  2) Please follow up with your primary care doctor in 2-3 days. You have been provided a report of the CT scan which shows an incidental finding of adnexal mass - please follow up with primary doctor for further evaluation  3) Please take lactulose, 30mg twice a day until bowel movement - has been sent to pharmacy Principal Discharge DX:	Constipation  Instructions for follow-up, activity and diet:	1) Please return to the ED should you have any new or worsening symptoms, worsening pain, develop fever, abdominal distension, or any concerning symptoms  2) Please follow up with your primary care doctor in 2-3 days. You have been provided a report of the CT scan which shows an incidental finding of adnexal mass - please follow up with primary doctor for further evaluation  3) Please take lactulose, 30ml twice a day until bowel movement - has been sent to pharmacy

## 2017-11-12 NOTE — ED PROVIDER NOTE - PROGRESS NOTE DETAILS
Dr. Gustafson: Discussed elevated phenytoin level with tox fellow. Level is mildly elevated. Pt is at baseline per her aide. Recommends holding next dose and repeat level with PCP in next 24- 48 hours. signficant constipation - will give lactulose here and send home with a few courses, titrating to bowel movement - incidental finding of adnexal mass - d/w aid and provided a copy of the CT report for outpatient follow up - safe to d/c - will call non emergent. Dr. Gustafson: Pt monitored on pulse ox duirng entire stay. No signif desats, no cyanosis. S/p disimipaction, enema, lactulose. CT findings as above. Reporst provided. DC to FU with PCP. Return precautions given .

## 2017-11-12 NOTE — ED PROVIDER NOTE - ATTENDING CONTRIBUTION TO CARE
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 Aide states pt often has episodes where she desats and will become slightly cyanotic. Reports had several episodes last night and that when they checked her pulse ox it was in 70s. Pt was placed on 3-4 L NC which she uses intermittently with resolution of hypoxia. Denies any recent cough, fever. Reports constipation. No BM x4 days.   Gen: WNWD NAD  HEENT: NCAT PERRL EOMI normal pharynx  Neck: supple  CV: RRR, no murmur  Lung: CTA BL  Abd: +BS soft NTND  Ext: wwp, palp pulses, FROMx4, no cce  Neuro: Awake, regards you when state her name, tremulous, CN grossly intact, BL foot drop  AP: 62y F here with episodes of hypoxia resolved with nasal cannula and constipation. Eval for PNA, aspiration, UTI. Pt on ATivan, klonopin and dilantin. Follows with Neuro Dr Coleman. Reportedly seizes daily. Check drug levels. 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 Aide states pt often has episodes where she desats and will become slightly cyanotic. Reports had several episodes last night and that when they checked her pulse ox it was in 70s. Pt was placed on 3-4 L NC which she uses intermittently with resolution of hypoxia. Denies any recent cough, fever. Reports constipation. No BM x4 days.   Gen: WNWD NAD  HEENT: NCAT PERRL EOMI normal pharynx  Neck: supple  CV: RRR, no murmur  Lung: CTA BL  Abd: +BS softly distended, PEG w/o surrounding redness   Ext: wwp, palp pulses, FROMx4, no cce, dec musclemass throughout  Neuro: Awake, regards you when state her name, tremulous, nonverbal, CN grossly intact, BL foot drop  AP: 62y F here with episodes of hypoxia resolved with nasal cannula and constipation. Eval for PNA, aspiration, UTI. Pt on ATivan, klonopin and dilantin. Follows with Neuro Dr Coleman. Reportedly seizes daily. Check drug levels.

## 2017-11-12 NOTE — ED PROVIDER NOTE - RESPIRATORY, MLM
Breath sounds clear and equal bilaterally. No secretions apperciated. CTA b/l. No cough during exam.

## 2017-11-12 NOTE — ED PROCEDURE NOTE - GENERAL PROCEDURE DETAILS
Finger was lubricated and stool was removed from rectum - Finger was lubricated and stool was removed from rectum - RN, pt aide and attending all present

## 2017-11-12 NOTE — ED PROCEDURE NOTE - CPROC ED INFORMED CONSENT1
Benefits, risks, and possible complications of procedure explained to patient/caregiver who verbalized understanding and gave verbal consent. Benefits, risks, and possible complications of procedure explained to patient/caregiver who verbalized understanding and gave verbal consent./from aide at bedside, pt non verbal

## 2017-11-12 NOTE — ED ADULT NURSE NOTE - OBJECTIVE STATEMENT
[age] year old female with hx of a traumatic brain injury, Parkinson's disease and cognitively impaired biba  for diff breathing. Per pts aide patient had an episode of cyanosis last night. Pt alert non verbal contracted  and utilizes 4L of oxygen at home. Pt arrived with O2 in place and per Ems desats to 91 on room air. Pt has   a patent feeding tube LLQ, stage 4 sacral decub noted. Patients aide reports patient has been constipated for  several days in which MD disimpacted stool on exam. Pts respirations unlabored, no fever or vomiting. LFA 20  gauge ivl placed, labs drawn and sent.

## 2017-11-13 LAB
CULTURE RESULTS: NO GROWTH — SIGNIFICANT CHANGE UP
SPECIMEN SOURCE: SIGNIFICANT CHANGE UP

## 2017-12-12 ENCOUNTER — APPOINTMENT (OUTPATIENT)
Dept: INTERNAL MEDICINE | Facility: CLINIC | Age: 62
End: 2017-12-12

## 2017-12-14 ENCOUNTER — APPOINTMENT (OUTPATIENT)
Dept: INTERNAL MEDICINE | Facility: CLINIC | Age: 62
End: 2017-12-14
Payer: MEDICARE

## 2017-12-14 ENCOUNTER — NON-APPOINTMENT (OUTPATIENT)
Age: 62
End: 2017-12-14

## 2017-12-14 VITALS
HEIGHT: 68 IN | HEART RATE: 144 BPM | WEIGHT: 106 LBS | SYSTOLIC BLOOD PRESSURE: 100 MMHG | RESPIRATION RATE: 22 BRPM | DIASTOLIC BLOOD PRESSURE: 70 MMHG | OXYGEN SATURATION: 90 % | TEMPERATURE: 96.8 F | BODY MASS INDEX: 16.06 KG/M2

## 2017-12-14 DIAGNOSIS — I49.9 CARDIAC ARRHYTHMIA, UNSPECIFIED: ICD-10-CM

## 2017-12-14 DIAGNOSIS — N83.9 NONINFLAMMATORY DISORDER OF OVARY, FALLOPIAN TUBE AND BROAD LIGAMENT, UNSPECIFIED: ICD-10-CM

## 2017-12-14 DIAGNOSIS — G31.9 DEGENERATIVE DISEASE OF NERVOUS SYSTEM, UNSPECIFIED: ICD-10-CM

## 2017-12-14 DIAGNOSIS — E03.9 HYPOTHYROIDISM, UNSPECIFIED: ICD-10-CM

## 2017-12-14 DIAGNOSIS — I34.1 NONRHEUMATIC MITRAL (VALVE) PROLAPSE: ICD-10-CM

## 2017-12-14 DIAGNOSIS — F20.9 SCHIZOPHRENIA, UNSPECIFIED: ICD-10-CM

## 2017-12-14 DIAGNOSIS — E11.9 TYPE 2 DIABETES MELLITUS W/OUT COMPLICATIONS: ICD-10-CM

## 2017-12-14 PROCEDURE — 99215 OFFICE O/P EST HI 40 MIN: CPT | Mod: 25

## 2017-12-14 PROCEDURE — 93000 ELECTROCARDIOGRAM COMPLETE: CPT

## 2017-12-18 ENCOUNTER — MESSAGE (OUTPATIENT)
Age: 62
End: 2017-12-18

## 2017-12-18 ENCOUNTER — RX RENEWAL (OUTPATIENT)
Age: 62
End: 2017-12-18

## 2017-12-18 RX ORDER — LORAZEPAM 1 MG/1
1 TABLET ORAL
Qty: 450 | Refills: 0 | Status: ACTIVE | COMMUNITY
Start: 2016-11-22 | End: 1900-01-01

## 2017-12-19 ENCOUNTER — APPOINTMENT (OUTPATIENT)
Dept: NEUROLOGY | Facility: CLINIC | Age: 62
End: 2017-12-19
Payer: MEDICARE

## 2017-12-19 VITALS — HEIGHT: 68 IN | DIASTOLIC BLOOD PRESSURE: 66 MMHG | SYSTOLIC BLOOD PRESSURE: 156 MMHG | HEART RATE: 87 BPM

## 2017-12-19 DIAGNOSIS — G40.909 EPILEPSY, UNSPECIFIED, NOT INTRACTABLE, W/OUT STATUS EPILEPTICUS: ICD-10-CM

## 2017-12-19 DIAGNOSIS — G21.11 NEUROLEPTIC INDUCED PARKINSONISM: ICD-10-CM

## 2017-12-19 PROCEDURE — 99213 OFFICE O/P EST LOW 20 MIN: CPT

## 2017-12-19 RX ORDER — PHENYTOIN 125 MG/5ML
125 SUSPENSION ORAL EVERY 8 HOURS
Qty: 360 | Refills: 2 | Status: ACTIVE | COMMUNITY
Start: 1900-01-01 | End: 1900-01-01

## 2018-01-12 ENCOUNTER — LABORATORY RESULT (OUTPATIENT)
Age: 63
End: 2018-01-12

## 2018-01-12 ENCOUNTER — APPOINTMENT (OUTPATIENT)
Dept: INTERNAL MEDICINE | Facility: CLINIC | Age: 63
End: 2018-01-12
Payer: MEDICARE

## 2018-01-12 PROCEDURE — 36415 COLL VENOUS BLD VENIPUNCTURE: CPT

## 2018-01-15 LAB
ALBUMIN SERPL ELPH-MCNC: 3.4 G/DL
ALP BLD-CCNC: 129 U/L
ALT SERPL-CCNC: <4 U/L
ANION GAP SERPL CALC-SCNC: 16 MMOL/L
AST SERPL-CCNC: 26 U/L
BASOPHILS # BLD AUTO: 0.03 K/UL
BASOPHILS NFR BLD AUTO: 0.4 %
BILIRUB SERPL-MCNC: 0.2 MG/DL
BUN SERPL-MCNC: 19 MG/DL
CALCIUM SERPL-MCNC: 9 MG/DL
CHLORIDE SERPL-SCNC: 87 MMOL/L
CHOLEST SERPL-MCNC: 229 MG/DL
CHOLEST/HDLC SERPL: 4.5 RATIO
CO2 SERPL-SCNC: 31 MMOL/L
CREAT SERPL-MCNC: 0.52 MG/DL
EOSINOPHIL # BLD AUTO: 0.08 K/UL
EOSINOPHIL NFR BLD AUTO: 1 %
GLUCOSE SERPL-MCNC: 85 MG/DL
HBA1C MFR BLD HPLC: 5.1 %
HCT VFR BLD CALC: 37.8 %
HDLC SERPL-MCNC: 51 MG/DL
HGB BLD-MCNC: 12.2 G/DL
IMM GRANULOCYTES NFR BLD AUTO: 0.1 %
LDLC SERPL CALC-MCNC: 157 MG/DL
LYMPHOCYTES # BLD AUTO: 0.67 K/UL
LYMPHOCYTES NFR BLD AUTO: 8.5 %
MAN DIFF?: NORMAL
MCHC RBC-ENTMCNC: 31 PG
MCHC RBC-ENTMCNC: 32.3 GM/DL
MCV RBC AUTO: 95.9 FL
MONOCYTES # BLD AUTO: 0.37 K/UL
MONOCYTES NFR BLD AUTO: 4.7 %
NEUTROPHILS # BLD AUTO: 6.69 K/UL
NEUTROPHILS NFR BLD AUTO: 85.3 %
PLATELET # BLD AUTO: 323 K/UL
POTASSIUM SERPL-SCNC: 3.9 MMOL/L
PROT SERPL-MCNC: 7 G/DL
RBC # BLD: 3.94 M/UL
RBC # FLD: 14.9 %
SAVE SPECIMEN: NORMAL
SODIUM SERPL-SCNC: 134 MMOL/L
T3RU NFR SERPL: 1.15 INDEX
T4 FREE SERPL-MCNC: 0.8 NG/DL
T4 SERPL-MCNC: 4.3 UG/DL
TRIGL SERPL-MCNC: 103 MG/DL
TSH SERPL-ACNC: 3.7 UIU/ML
WBC # FLD AUTO: 7.85 K/UL

## 2018-02-10 ENCOUNTER — EMERGENCY (EMERGENCY)
Facility: HOSPITAL | Age: 63
LOS: 1 days | End: 2018-02-10
Attending: EMERGENCY MEDICINE | Admitting: EMERGENCY MEDICINE
Payer: MEDICARE

## 2018-02-10 VITALS — RESPIRATION RATE: 15 BRPM | DIASTOLIC BLOOD PRESSURE: 39 MMHG | HEART RATE: 114 BPM | SYSTOLIC BLOOD PRESSURE: 54 MMHG

## 2018-02-10 VITALS
HEART RATE: 66 BPM | DIASTOLIC BLOOD PRESSURE: 72 MMHG | OXYGEN SATURATION: 100 % | SYSTOLIC BLOOD PRESSURE: 147 MMHG | RESPIRATION RATE: 15 BRPM

## 2018-02-10 LAB
ALBUMIN SERPL ELPH-MCNC: 2.6 G/DL — LOW (ref 3.3–5)
ALP SERPL-CCNC: 141 U/L — HIGH (ref 40–120)
ALT FLD-CCNC: 43 U/L RC — SIGNIFICANT CHANGE UP (ref 10–45)
ANION GAP SERPL CALC-SCNC: 21 MMOL/L — HIGH (ref 5–17)
ANISOCYTOSIS BLD QL: SLIGHT — SIGNIFICANT CHANGE UP
APTT BLD: 25.2 SEC — LOW (ref 27.5–37.4)
AST SERPL-CCNC: 322 U/L — HIGH (ref 10–40)
BASE EXCESS BLDV CALC-SCNC: 0.9 MMOL/L — SIGNIFICANT CHANGE UP (ref -2–2)
BASO STIPL BLD QL SMEAR: SLIGHT — SIGNIFICANT CHANGE UP
BASOPHILS # BLD AUTO: 0 K/UL — SIGNIFICANT CHANGE UP (ref 0–0.2)
BASOPHILS NFR BLD AUTO: 0 % — SIGNIFICANT CHANGE UP (ref 0–2)
BILIRUB SERPL-MCNC: 0.2 MG/DL — SIGNIFICANT CHANGE UP (ref 0.2–1.2)
BUN SERPL-MCNC: 21 MG/DL — SIGNIFICANT CHANGE UP (ref 7–23)
CA-I SERPL-SCNC: 1.1 MMOL/L — LOW (ref 1.12–1.3)
CALCIUM SERPL-MCNC: 8.7 MG/DL — SIGNIFICANT CHANGE UP (ref 8.4–10.5)
CHLORIDE BLDV-SCNC: 99 MMOL/L — SIGNIFICANT CHANGE UP (ref 96–108)
CHLORIDE SERPL-SCNC: 98 MMOL/L — SIGNIFICANT CHANGE UP (ref 96–108)
CO2 BLDV-SCNC: 36 MMOL/L — HIGH (ref 22–30)
CO2 SERPL-SCNC: 29 MMOL/L — SIGNIFICANT CHANGE UP (ref 22–31)
CREAT SERPL-MCNC: 0.65 MG/DL — SIGNIFICANT CHANGE UP (ref 0.5–1.3)
EOSINOPHIL # BLD AUTO: 0.1 K/UL — SIGNIFICANT CHANGE UP (ref 0–0.5)
EOSINOPHIL NFR BLD AUTO: 1 % — SIGNIFICANT CHANGE UP (ref 0–6)
GAS PNL BLDV: 140 MMOL/L — SIGNIFICANT CHANGE UP (ref 136–145)
GAS PNL BLDV: SIGNIFICANT CHANGE UP
GLUCOSE BLDV-MCNC: 232 MG/DL — HIGH (ref 70–99)
GLUCOSE SERPL-MCNC: 230 MG/DL — HIGH (ref 70–99)
HCO3 BLDV-SCNC: 32 MMOL/L — HIGH (ref 21–29)
HCT VFR BLD CALC: 35.9 % — SIGNIFICANT CHANGE UP (ref 34.5–45)
HCT VFR BLDA CALC: 34 % — LOW (ref 39–50)
HGB BLD CALC-MCNC: 11.1 G/DL — LOW (ref 11.5–15.5)
HGB BLD-MCNC: 10.8 G/DL — LOW (ref 11.5–15.5)
HYPOCHROMIA BLD QL: SLIGHT — SIGNIFICANT CHANGE UP
INR BLD: 1 RATIO — SIGNIFICANT CHANGE UP (ref 0.88–1.16)
LACTATE BLDV-MCNC: 10.3 MMOL/L — CRITICAL HIGH (ref 0.7–2)
LYMPHOCYTES # BLD AUTO: 1.4 K/UL — SIGNIFICANT CHANGE UP (ref 1–3.3)
LYMPHOCYTES # BLD AUTO: 17.8 % — SIGNIFICANT CHANGE UP (ref 13–44)
MACROCYTES BLD QL: SIGNIFICANT CHANGE UP
MCHC RBC-ENTMCNC: 30.2 GM/DL — LOW (ref 32–36)
MCHC RBC-ENTMCNC: 34.3 PG — HIGH (ref 27–34)
MCV RBC AUTO: 113 FL — HIGH (ref 80–100)
MICROCYTES BLD QL: SLIGHT — SIGNIFICANT CHANGE UP
MONOCYTES # BLD AUTO: 0.4 K/UL — SIGNIFICANT CHANGE UP (ref 0–0.9)
MONOCYTES NFR BLD AUTO: 5.2 % — SIGNIFICANT CHANGE UP (ref 2–14)
NEUTROPHILS # BLD AUTO: 5.9 K/UL — SIGNIFICANT CHANGE UP (ref 1.8–7.4)
NEUTROPHILS NFR BLD AUTO: 76 % — SIGNIFICANT CHANGE UP (ref 43–77)
NT-PROBNP SERPL-SCNC: 8886 PG/ML — HIGH (ref 0–300)
PCO2 BLDV: 101 MMHG — HIGH (ref 35–50)
PH BLDV: 7.13 — CRITICAL LOW (ref 7.35–7.45)
PLAT MORPH BLD: NORMAL — SIGNIFICANT CHANGE UP
PLATELET # BLD AUTO: 175 K/UL — SIGNIFICANT CHANGE UP (ref 150–400)
PO2 BLDV: 133 MMHG — HIGH (ref 25–45)
POIKILOCYTOSIS BLD QL AUTO: SLIGHT — SIGNIFICANT CHANGE UP
POLYCHROMASIA BLD QL SMEAR: SLIGHT — SIGNIFICANT CHANGE UP
POTASSIUM BLDV-SCNC: 6.9 MMOL/L — CRITICAL HIGH (ref 3.5–5)
POTASSIUM SERPL-MCNC: 4.3 MMOL/L — SIGNIFICANT CHANGE UP (ref 3.5–5.3)
POTASSIUM SERPL-SCNC: 4.3 MMOL/L — SIGNIFICANT CHANGE UP (ref 3.5–5.3)
PROT SERPL-MCNC: 5.9 G/DL — LOW (ref 6–8.3)
PROTHROM AB SERPL-ACNC: 10.9 SEC — SIGNIFICANT CHANGE UP (ref 9.8–12.7)
RBC # BLD: 3.16 M/UL — LOW (ref 3.8–5.2)
RBC # FLD: 16.2 % — HIGH (ref 10.3–14.5)
RBC BLD AUTO: ABNORMAL
SAO2 % BLDV: 98 % — HIGH (ref 67–88)
SODIUM SERPL-SCNC: 148 MMOL/L — HIGH (ref 135–145)
TROPONIN T SERPL-MCNC: 0.03 NG/ML — SIGNIFICANT CHANGE UP (ref 0–0.06)
WBC # BLD: 7.8 K/UL — SIGNIFICANT CHANGE UP (ref 3.8–10.5)
WBC # FLD AUTO: 7.8 K/UL — SIGNIFICANT CHANGE UP (ref 3.8–10.5)

## 2018-02-10 PROCEDURE — 99291 CRITICAL CARE FIRST HOUR: CPT | Mod: 25,GC

## 2018-02-10 PROCEDURE — 87040 BLOOD CULTURE FOR BACTERIA: CPT

## 2018-02-10 PROCEDURE — 85730 THROMBOPLASTIN TIME PARTIAL: CPT

## 2018-02-10 PROCEDURE — 96374 THER/PROPH/DIAG INJ IV PUSH: CPT | Mod: XU

## 2018-02-10 PROCEDURE — 85014 HEMATOCRIT: CPT

## 2018-02-10 PROCEDURE — 84295 ASSAY OF SERUM SODIUM: CPT

## 2018-02-10 PROCEDURE — 84132 ASSAY OF SERUM POTASSIUM: CPT

## 2018-02-10 PROCEDURE — 80053 COMPREHEN METABOLIC PANEL: CPT

## 2018-02-10 PROCEDURE — 85027 COMPLETE CBC AUTOMATED: CPT

## 2018-02-10 PROCEDURE — 96376 TX/PRO/DX INJ SAME DRUG ADON: CPT | Mod: XU

## 2018-02-10 PROCEDURE — 83605 ASSAY OF LACTIC ACID: CPT

## 2018-02-10 PROCEDURE — 36556 INSERT NON-TUNNEL CV CATH: CPT

## 2018-02-10 PROCEDURE — 82330 ASSAY OF CALCIUM: CPT

## 2018-02-10 PROCEDURE — 85610 PROTHROMBIN TIME: CPT

## 2018-02-10 PROCEDURE — 83880 ASSAY OF NATRIURETIC PEPTIDE: CPT

## 2018-02-10 PROCEDURE — 82435 ASSAY OF BLOOD CHLORIDE: CPT

## 2018-02-10 PROCEDURE — 99291 CRITICAL CARE FIRST HOUR: CPT | Mod: 25

## 2018-02-10 PROCEDURE — 82947 ASSAY GLUCOSE BLOOD QUANT: CPT

## 2018-02-10 PROCEDURE — 82803 BLOOD GASES ANY COMBINATION: CPT

## 2018-02-10 PROCEDURE — 84484 ASSAY OF TROPONIN QUANT: CPT

## 2018-02-10 PROCEDURE — 36556 INSERT NON-TUNNEL CV CATH: CPT | Mod: GC

## 2018-02-10 PROCEDURE — 87150 DNA/RNA AMPLIFIED PROBE: CPT

## 2018-02-10 PROCEDURE — 96375 TX/PRO/DX INJ NEW DRUG ADDON: CPT | Mod: XU

## 2018-02-10 RX ORDER — MORPHINE SULFATE 50 MG/1
4 CAPSULE, EXTENDED RELEASE ORAL ONCE
Qty: 0 | Refills: 0 | Status: DISCONTINUED | OUTPATIENT
Start: 2018-02-10 | End: 2018-02-10

## 2018-02-10 RX ORDER — CALCIUM CHLORIDE
1000 POWDER (GRAM) MISCELLANEOUS ONCE
Qty: 0 | Refills: 0 | Status: COMPLETED | OUTPATIENT
Start: 2018-02-10 | End: 2018-02-10

## 2018-02-10 RX ORDER — SODIUM BICARBONATE 1 MEQ/ML
100 SYRINGE (ML) INTRAVENOUS ONCE
Qty: 0 | Refills: 0 | Status: COMPLETED | OUTPATIENT
Start: 2018-02-10 | End: 2018-02-10

## 2018-02-10 RX ORDER — NOREPINEPHRINE BITARTRATE/D5W 8 MG/250ML
1 PLASTIC BAG, INJECTION (ML) INTRAVENOUS
Qty: 8 | Refills: 0 | Status: DISCONTINUED | OUTPATIENT
Start: 2018-02-10 | End: 2018-02-14

## 2018-02-10 RX ORDER — FENTANYL CITRATE 50 UG/ML
50 INJECTION INTRAVENOUS ONCE
Qty: 0 | Refills: 0 | Status: DISCONTINUED | OUTPATIENT
Start: 2018-02-10 | End: 2018-02-10

## 2018-02-10 RX ORDER — SODIUM CHLORIDE 9 MG/ML
1000 INJECTION INTRAMUSCULAR; INTRAVENOUS; SUBCUTANEOUS ONCE
Qty: 0 | Refills: 0 | Status: COMPLETED | OUTPATIENT
Start: 2018-02-10 | End: 2018-02-10

## 2018-02-10 RX ORDER — EPINEPHRINE 0.3 MG/.3ML
0.03 INJECTION INTRAMUSCULAR; SUBCUTANEOUS ONCE
Qty: 0 | Refills: 0 | Status: COMPLETED | OUTPATIENT
Start: 2018-02-10 | End: 2018-02-10

## 2018-02-10 RX ADMIN — MORPHINE SULFATE 4 MILLIGRAM(S): 50 CAPSULE, EXTENDED RELEASE ORAL at 21:57

## 2018-02-10 RX ADMIN — SODIUM CHLORIDE 2000 MILLILITER(S): 9 INJECTION INTRAMUSCULAR; INTRAVENOUS; SUBCUTANEOUS at 23:17

## 2018-02-10 RX ADMIN — EPINEPHRINE 0.03 MILLIGRAM(S): 0.3 INJECTION INTRAMUSCULAR; SUBCUTANEOUS at 18:15

## 2018-02-10 RX ADMIN — Medication 1000 MILLIGRAM(S): at 23:16

## 2018-02-10 RX ADMIN — FENTANYL CITRATE 50 MICROGRAM(S): 50 INJECTION INTRAVENOUS at 21:47

## 2018-02-10 RX ADMIN — FENTANYL CITRATE 50 MICROGRAM(S): 50 INJECTION INTRAVENOUS at 19:15

## 2018-02-10 RX ADMIN — FENTANYL CITRATE 50 MICROGRAM(S): 50 INJECTION INTRAVENOUS at 19:30

## 2018-02-10 RX ADMIN — Medication 100 MILLIEQUIVALENT(S): at 23:16

## 2018-02-10 RX ADMIN — Medication 121.88 MICROGRAM(S)/KG/MIN: at 23:16

## 2018-02-10 NOTE — ED PROCEDURE NOTE - CPROC ED INFORMED CONSENT1
Benefits, risks, and possible complications of procedure explained to patient/caregiver who verbalized understanding and gave verbal consent. This was an emergent procedure and consent was implied.

## 2018-02-10 NOTE — ED PROCEDURE NOTE - CPROC ED INFUS LINE DETAIL1
The catheter was placed using sterile technique./The guidewire was recovered./Ultrasound guidance was used during placement./All lumen(s) aspirated and flushed without difficulty./The location was identified, and the area was draped and prepped.

## 2018-02-10 NOTE — ED PROVIDER NOTE - CRITICAL CARE PROVIDED
additional history taking/interpretation of diagnostic studies/consult w/ pt's family directly relating to pts condition/conducted a detailed discussion of DNR status/direct patient care (not related to procedure)/documentation

## 2018-02-10 NOTE — ED PROVIDER NOTE - PHYSICAL EXAMINATION
PE: CONSTITUTIONAL: Nonresponsive, Ill appearing, intubated ENMT: Intubated, nasal mucosa clear, mouth with dry mucosa. HEAD: NCAT EYES: Fixed dilated pupils at 4mm bilaterally CARDIAC: RRR, intermittent bradycardia RESPIRATORY: Mechanical breath sounds GI: Abdomen non-distended, soft MSK: Spine appears normal, range of motion is not limited NEURO: Unresponsive SKIN: Skin tone pale and cool.

## 2018-02-10 NOTE — ED ADULT NURSE NOTE - OBJECTIVE STATEMENT
63 y.o F arrived via EMS. Pre-notification to charge nurse that pt in cardiac arrest. Upon arrival to ED, EMS states ROSC already obtained 1 minute prior to arrival to ED. pt intubated by EMS and L #18 gauge EJ placed by EMS prior to arrival to ED. As per EMS, pt was last seen alive 15 minutes prior to family calling EMS. EMS states pt is on home hospice, states pt has unofficial DNR paper XX. upon arrival pt also noted to have PEG tube and Indwelling Ruiz Catheter in place. upon arrival to ED central line placed by MD Jason 63 y.o F arrived via EMS. Pre-notification to charge nurse that pt in cardiac arrest. Upon arrival to ED, EMS states ROSC already obtained 1 minute prior to arrival to ED. pt intubated by EMS and L #18 gauge EJ placed by EMS prior to arrival to ED. As per EMS, pt was last seen alive 15 minutes prior to family calling EMS. EMS states pt was found "completely unresponsive" and in asystole upon their arrival. pt is on home hospice, states pt has unofficial DNR paper XX. upon arrival to ED pt also noted to have PEG tube and Indwelling Ruiz Catheter in place. upon arrival to ED central line in R femoral placed by MD Jason. 63 y.o F arrived via EMS. Pre-notification to charge nurse that pt in cardiac arrest. Upon arrival to ED, EMS states ROSC already obtained in the field prior to arrival to ED. pt intubated by EMS (size 7 tube 25 at the lip) and L #18 gauge EJ placed by EMS prior to arrival to ED. per EMS, pt is on home hospice and was found "completely unresponsive" and in asystole upon their arrival. EMS states upon having conversation with family when they arrived to pt home, family requesting pt to be treated as full code. upon arrival to ED pt noted to have PEG tube and Indwelling Ruiz Catheter in place. no family at the bedside upon initial assessment, limited hx obtained r/t pt condition. Safety and comfort provided/maintained. MD Jason and Giovanni at bedside

## 2018-02-10 NOTE — ED PROVIDER NOTE - MEDICAL DECISION MAKING DETAILS
63y Female PMH cognitive impairment, Parkinson's disease, on Home Hospice BIBEMS for cardiac arrest with ROSC on the field, resuscitation with continued pressors required, will assess for cardiac / infectious etiology, ascertain goals of care with patient and family.  - Mukund Jason MD

## 2018-02-10 NOTE — ED ADULT NURSE REASSESSMENT NOTE - NS ED NURSE REASSESS COMMENT FT1
R femoral central line placed by MD Jason, MD Davila at bedside. Time out performed prior to procedure initiation, central line insertion checklist completed by MD Davila.
Report received from JOE Espinoza in Critical Care.
2121: DNR/ DNI obtained here in this ED by ED MD Estrada and ED MD Jason.  2145: Family members at the bedside.  Patient to be extubated.  2147: Fentanyl 50mcg IV push given for comfort measures.   2148: ED MD Jason at the head of the bed, patient extubated.  Comfort measures being performed for the patient with family at bedside.
2238: Patient pronounced dead by ED Attending Sean and ED MD Jason.
At patient's family request, DNR/ DNI to be signed in ED and patient to be extubated at their request.
At patient's family request, ED Attending Giovanni at the bedside to discuss DNR/ DNI.

## 2018-02-10 NOTE — ED PROVIDER NOTE - PROGRESS NOTE DETAILS
Spoke with Mother, Freya Malloy, who is the Health Care Proxy. Patient discharged from [facility] today, was sent home to Home Hospice today. EMS did not get enough information that the patient was DNR/DNI. Patient did not want to suffer any longer, mother in agreement. Explained that extubation is ethically the same as withdrawing the tube, will withdraw tube and make patient as comfortable as possible.  - Mukund Jason MD Dr Gamez the ME called to report that patient is with [facility]([phone]) and that Dr. Garcia([phone]) will complete death certificate tomorrow. Reports that this is not an ME case.

## 2018-02-10 NOTE — ED PROVIDER NOTE - OBJECTIVE STATEMENT
63y Female PMH cognitive impairment, Parkinson's disease, on Home Hospice BIBEMS for cardiac arrest with ROSC on the field. Patient found in asystole, though patient was at home hospice, EMS unable to find paperwork, patient was intubated. Patient was resuscitated on the field with ROSC after 3 rounds of epinephrine with compressions. Patient nonresponsive.

## 2018-02-12 LAB
-  COAGULASE NEGATIVE STAPHYLOCOCCUS: SIGNIFICANT CHANGE UP
GRAM STN FLD: SIGNIFICANT CHANGE UP
METHOD TYPE: SIGNIFICANT CHANGE UP

## 2018-02-13 LAB
CULTURE RESULTS: SIGNIFICANT CHANGE UP
ORGANISM # SPEC MICROSCOPIC CNT: SIGNIFICANT CHANGE UP
ORGANISM # SPEC MICROSCOPIC CNT: SIGNIFICANT CHANGE UP
SPECIMEN SOURCE: SIGNIFICANT CHANGE UP

## 2018-02-15 LAB
CULTURE RESULTS: SIGNIFICANT CHANGE UP
SPECIMEN SOURCE: SIGNIFICANT CHANGE UP

## 2018-04-24 ENCOUNTER — RESULT CHARGE (OUTPATIENT)
Age: 63
End: 2018-04-24

## 2018-07-28 NOTE — ED PROVIDER NOTE - RECENT EXPOSURE TO
Transferred from PACU, awaiting bed placement, ingestible items removed from room, pt placed on watch, security outside of room, updated pt on POC.    none known

## 2019-04-26 NOTE — AIRWAY REMOVAL NOTE  ADULT & PEDS - NS REMOVAL  OF ARTIFICAL AIRWAY STRIDOR
no Breathing spontaneous and unlabored. Breath sounds clear and equal bilaterally with regular rhythm.

## 2019-06-05 NOTE — ED ADULT NURSE NOTE - OBJECTIVE STATEMENT
23 63 Y/O F via EMS presenting with resp failure and epistaxis as per EMS. EMS states the pt is from home and suddenly had an onset of epitaxis and was unable to control it. EMS states the pt may perhaps aspirated on blood and pt then had resp distress and went into failure. Pt appear to be in distress and on cpap upon arrival. Pt o2 sat is 86% on CPAP. Pt also was cyanotic and cold in bilat extremities. Pt suctioned airway for visible blood.  100 cc of bright red blood and clots suctioned. Patent airway ensure. Pt has a hx of parkinsons. Pt is unable to speak. At 11:10 pt was intubated with ett tube of 7 and 24 cm at lip line via glidescope. Pt now sating at 100% vent. There was color change on etco2 and breath sounds present. Pt was awake, however not alert. Lungs clear throughout bilat. S1 and S2 heard. CM: ST. Abd soft, nontender, nondistended. + bs in all 4 quadrants. Pt has a PEG tube in place.  Unable to assess urinary due to pt acuity. Skin w.d.i. Safety and comfort measures maintained. Aide at bedside. Aide denies blood thinners. Aide verbally stated that "the family would like everything done for her."

## 2023-06-27 NOTE — H&P ADULT - PROBLEM SELECTOR PLAN 3
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  - continue sinemet  - non-oral nutrition with tube feeds  - goals of care discussion in the past with family (patient refused hospice placement in the past). Mother of the patient has been making medical decisions but has recently suffered a stroke

## 2025-05-21 NOTE — AIRWAY REMOVAL NOTE  ADULT & PEDS - COUGH
7:10 PM Recheck on patient. Discussed with patient ED findings and plan for discharge. Patient was given ED warnings, discharge instructions, and follow up information to go home with. Patient understands and agrees with plan for discharge. Any questions have been answered.    
no productive sputum